# Patient Record
Sex: FEMALE | Race: WHITE | NOT HISPANIC OR LATINO | Employment: OTHER | URBAN - NONMETROPOLITAN AREA
[De-identification: names, ages, dates, MRNs, and addresses within clinical notes are randomized per-mention and may not be internally consistent; named-entity substitution may affect disease eponyms.]

---

## 2017-03-23 ENCOUNTER — TRANSCRIBE ORDERS (OUTPATIENT)
Dept: ADMINISTRATIVE | Facility: HOSPITAL | Age: 77
End: 2017-03-23

## 2017-03-23 ENCOUNTER — HOSPITAL ENCOUNTER (OUTPATIENT)
Dept: GENERAL RADIOLOGY | Facility: HOSPITAL | Age: 77
Discharge: HOME | End: 2017-03-23
Admitting: FAMILY MEDICINE

## 2017-03-23 DIAGNOSIS — R05.9 COUGH: Primary | ICD-10-CM

## 2017-03-23 DIAGNOSIS — R06.2 WHEEZING: ICD-10-CM

## 2017-03-23 PROCEDURE — 71020 HC CHEST PA AND LATERAL: CPT

## 2017-04-24 ENCOUNTER — HOSPITAL ENCOUNTER (OUTPATIENT)
Dept: CT IMAGING | Facility: HOSPITAL | Age: 77
Discharge: HOME | End: 2017-04-24
Attending: INTERNAL MEDICINE | Admitting: INTERNAL MEDICINE

## 2017-04-24 ENCOUNTER — LAB (OUTPATIENT)
Dept: LAB | Facility: HOSPITAL | Age: 77
End: 2017-04-24

## 2017-04-24 DIAGNOSIS — C34.12 CANCER OF UPPER LOBE OF LEFT LUNG (HCC): ICD-10-CM

## 2017-04-24 LAB
ALBUMIN SERPL-MCNC: 4.1 G/DL (ref 3.2–4.8)
ALBUMIN/GLOB SERPL: 1.8 G/DL (ref 1.5–2.5)
ALP SERPL-CCNC: 63 U/L (ref 25–100)
ALT SERPL W P-5'-P-CCNC: 29 U/L (ref 7–40)
ANION GAP SERPL CALCULATED.3IONS-SCNC: 9 MMOL/L (ref 3–11)
AST SERPL-CCNC: 27 U/L (ref 0–33)
BASOPHILS # BLD AUTO: 0.03 10*3/MM3 (ref 0–0.2)
BASOPHILS NFR BLD AUTO: 0.3 % (ref 0–1)
BILIRUB SERPL-MCNC: 0.3 MG/DL (ref 0.3–1.2)
BUN BLD-MCNC: 25 MG/DL (ref 9–23)
BUN/CREAT SERPL: 22.7 (ref 7–25)
CALCIUM SPEC-SCNC: 9.9 MG/DL (ref 8.7–10.4)
CHLORIDE SERPL-SCNC: 105 MMOL/L (ref 99–109)
CO2 SERPL-SCNC: 29 MMOL/L (ref 20–31)
CREAT BLD-MCNC: 1.1 MG/DL (ref 0.6–1.3)
CREAT BLDA-MCNC: 1.1 MG/DL (ref 0.6–1.3)
DEPRECATED RDW RBC AUTO: 51.4 FL (ref 37–54)
EOSINOPHIL # BLD AUTO: 0.6 10*3/MM3 (ref 0.1–0.3)
EOSINOPHIL NFR BLD AUTO: 5.7 % (ref 0–3)
ERYTHROCYTE [DISTWIDTH] IN BLOOD BY AUTOMATED COUNT: 15.1 % (ref 11.3–14.5)
GFR SERPL CREATININE-BSD FRML MDRD: 48 ML/MIN/1.73
GLOBULIN UR ELPH-MCNC: 2.3 GM/DL
GLUCOSE BLD-MCNC: 89 MG/DL (ref 70–100)
HCT VFR BLD AUTO: 39.6 % (ref 34.5–44)
HGB BLD-MCNC: 12.1 G/DL (ref 11.5–15.5)
IMM GRANULOCYTES # BLD: 0.05 10*3/MM3 (ref 0–0.03)
IMM GRANULOCYTES NFR BLD: 0.5 % (ref 0–0.6)
LYMPHOCYTES # BLD AUTO: 2.13 10*3/MM3 (ref 0.6–4.8)
LYMPHOCYTES NFR BLD AUTO: 20.2 % (ref 24–44)
MCH RBC QN AUTO: 28.7 PG (ref 27–31)
MCHC RBC AUTO-ENTMCNC: 30.6 G/DL (ref 32–36)
MCV RBC AUTO: 94.1 FL (ref 80–99)
MONOCYTES # BLD AUTO: 0.84 10*3/MM3 (ref 0–1)
MONOCYTES NFR BLD AUTO: 8 % (ref 0–12)
NEUTROPHILS # BLD AUTO: 6.91 10*3/MM3 (ref 1.5–8.3)
NEUTROPHILS NFR BLD AUTO: 65.3 % (ref 41–71)
PLATELET # BLD AUTO: 221 10*3/MM3 (ref 150–450)
PMV BLD AUTO: 10.8 FL (ref 6–12)
POTASSIUM BLD-SCNC: 4.5 MMOL/L (ref 3.5–5.5)
PROT SERPL-MCNC: 6.4 G/DL (ref 5.7–8.2)
RBC # BLD AUTO: 4.21 10*6/MM3 (ref 3.89–5.14)
SODIUM BLD-SCNC: 143 MMOL/L (ref 132–146)
WBC NRBC COR # BLD: 10.56 10*3/MM3 (ref 3.5–10.8)

## 2017-04-24 PROCEDURE — 71260 CT THORAX DX C+: CPT

## 2017-04-24 PROCEDURE — 0 IOPAMIDOL 61 % SOLUTION: Performed by: INTERNAL MEDICINE

## 2017-04-24 PROCEDURE — 85025 COMPLETE CBC W/AUTO DIFF WBC: CPT | Performed by: INTERNAL MEDICINE

## 2017-04-24 PROCEDURE — 80053 COMPREHEN METABOLIC PANEL: CPT | Performed by: INTERNAL MEDICINE

## 2017-04-24 PROCEDURE — 74177 CT ABD & PELVIS W/CONTRAST: CPT

## 2017-04-24 PROCEDURE — 36415 COLL VENOUS BLD VENIPUNCTURE: CPT

## 2017-04-24 PROCEDURE — 82565 ASSAY OF CREATININE: CPT

## 2017-04-24 RX ADMIN — IOPAMIDOL 60 ML: 612 INJECTION, SOLUTION INTRAVENOUS at 09:30

## 2017-04-26 ENCOUNTER — OFFICE VISIT (OUTPATIENT)
Dept: ONCOLOGY | Facility: CLINIC | Age: 77
End: 2017-04-26

## 2017-04-26 VITALS
WEIGHT: 210 LBS | BODY MASS INDEX: 34.95 KG/M2 | HEART RATE: 76 BPM | RESPIRATION RATE: 15 BRPM | DIASTOLIC BLOOD PRESSURE: 72 MMHG | TEMPERATURE: 97.2 F | SYSTOLIC BLOOD PRESSURE: 150 MMHG

## 2017-04-26 DIAGNOSIS — C34.12 CANCER OF UPPER LOBE OF LEFT LUNG (HCC): Primary | ICD-10-CM

## 2017-04-26 PROCEDURE — 99214 OFFICE O/P EST MOD 30 MIN: CPT | Performed by: INTERNAL MEDICINE

## 2017-04-26 NOTE — PROGRESS NOTES
DATE OF VISIT: 4/26/2017    REASON FOR VISIT: Followup stage IB, [id number], poorly differentiated  adenocarcinoma of the lung.    HISTORY OF PRESENT ILLNESS: The patient is a very pleasant 76-year-old female  with past medical history significant for poorly differentiated adenocarcinoma  of the lung, [id number], tumor size 3.2 cm in the left upper lobe. The patient is  status post left upper lobe resection with mediastinal lymph node sampling done  by Dr. Jeremy Herrera on 08/26/2015. The patient is here today in scheduled  followup visit.    SUBJECTIVE: The patient has been doing fairly well.  She is recovering from an upper airway infection she completed [facility] She denied any headaches,  denied any night sweats. He breathing is back to normal. Her appetite is good.  She denied any abdominal pain.     REVIEW OF SYSTEMS: All the other systems are reviewed by me and negative  except what is mentioned in HPI and subjectives.     PAST MEDICAL HISTORY/SOCIAL HISTORY/FAMILY HISTORY: Unchanged from my prior  documentation done on 08/13/2015.      Current Outpatient Prescriptions:   •  Ascorbic Acid (VITAMIN C PO), Take  by mouth., Disp: , Rfl:   •  aspirin 81 MG tablet, Take  by mouth., Disp: , Rfl:   •  Cholecalciferol (VITAMIN D3 PO), Take  by mouth., Disp: , Rfl:   •  CRANBERRY PO, Take  by mouth., Disp: , Rfl:   •  ezetimibe (ZETIA) 10 MG tablet, Take  by mouth., Disp: , Rfl:   •  loratadine (CLARITIN) 10 MG tablet, Take  by mouth., Disp: , Rfl:   •  metoclopramide (REGLAN) 5 MG tablet, , Disp: , Rfl:   •  montelukast (SINGULAIR) 10 MG tablet, Take  by mouth., Disp: , Rfl:   •  Multiple Vitamin tablet, Take  by mouth., Disp: , Rfl:   •  olmesartan-hydrochlorothiazide (BENICAR HCT) 40-12.5 MG per tablet, Take  by mouth., Disp: , Rfl:   •  Omega-3 Fatty Acids (OMEGA 3 PO), Take  by mouth., Disp: , Rfl:   •  Omeprazole 20 MG tablet delayed-release, Take  by mouth., Disp: , Rfl:   •  simvastatin (ZOCOR) 40 MG tablet, Take  by  mouth., Disp: , Rfl:   •  Triamcinolone Acetonide (NASACORT AQ NA), into each nostril., Disp: , Rfl:     PHYSICAL EXAMINATION:   There were no vitals taken for this visit.  ECOG1  GENERAL: Age appropriate. No acute distress.   HEENT: Head atraumatic, normocephalic.   NECK: Supple. No JVD. No lymphadenopathy.   LUNGS: Clear to auscultation bilaterally. No wheezing. No rhonchi.   HEART: Regular rate and rhythm. S1, S2, no murmurs.   ABDOMEN: Soft, nontender, nondistended. Bowel sounds positive. No  hepatosplenomegaly.   EXTREMITIES: No clubbing, cyanosis, or edema.   SKIN: No rashes. No purpura.   NEUROLOGIC: Awake and oriented x3. Strength 5 out of 5 in all muscle groups.     Lab on 04/24/2017   Component Date Value Ref Range Status   • Glucose 04/24/2017 89  70 - 100 mg/dL Final   • BUN 04/24/2017 25* 9 - 23 mg/dL Final   • Creatinine 04/24/2017 1.10  0.60 - 1.30 mg/dL Final   • Sodium 04/24/2017 143  132 - 146 mmol/L Final   • Potassium 04/24/2017 4.5  3.5 - 5.5 mmol/L Final   • Chloride 04/24/2017 105  99 - 109 mmol/L Final   • CO2 04/24/2017 29.0  20.0 - 31.0 mmol/L Final   • Calcium 04/24/2017 9.9  8.7 - 10.4 mg/dL Final   • Total Protein 04/24/2017 6.4  5.7 - 8.2 g/dL Final   • Albumin 04/24/2017 4.10  3.20 - 4.80 g/dL Final   • ALT (SGPT) 04/24/2017 29  7 - 40 U/L Final   • AST (SGOT) 04/24/2017 27  0 - 33 U/L Final   • Alkaline Phosphatase 04/24/2017 63  25 - 100 U/L Final   • Total Bilirubin 04/24/2017 0.3  0.3 - 1.2 mg/dL Final   • eGFR Non  Amer 04/24/2017 48* >60 mL/min/1.73 Final   • Globulin 04/24/2017 2.3  gm/dL Final   • A/G Ratio 04/24/2017 1.8  1.5 - 2.5 g/dL Final   • BUN/Creatinine Ratio 04/24/2017 22.7  7.0 - 25.0 Final   • Anion Gap 04/24/2017 9.0  3.0 - 11.0 mmol/L Final   • WBC 04/24/2017 10.56  3.50 - 10.80 10*3/mm3 Final   • RBC 04/24/2017 4.21  3.89 - 5.14 10*6/mm3 Final   • Hemoglobin 04/24/2017 12.1  11.5 - 15.5 g/dL Final   • Hematocrit 04/24/2017 39.6  34.5 - 44.0 % Final   • MCV  04/24/2017 94.1  80.0 - 99.0 fL Final   • MCH 04/24/2017 28.7  27.0 - 31.0 pg Final   • MCHC 04/24/2017 30.6* 32.0 - 36.0 g/dL Final   • RDW 04/24/2017 15.1* 11.3 - 14.5 % Final   • RDW-SD 04/24/2017 51.4  37.0 - 54.0 fl Final   • MPV 04/24/2017 10.8  6.0 - 12.0 fL Final   • Platelets 04/24/2017 221  150 - 450 10*3/mm3 Final   • Neutrophil % 04/24/2017 65.3  41.0 - 71.0 % Final   • Lymphocyte % 04/24/2017 20.2* 24.0 - 44.0 % Final   • Monocyte % 04/24/2017 8.0  0.0 - 12.0 % Final   • Eosinophil % 04/24/2017 5.7* 0.0 - 3.0 % Final   • Basophil % 04/24/2017 0.3  0.0 - 1.0 % Final   • Immature Grans % 04/24/2017 0.5  0.0 - 0.6 % Final   • Neutrophils, Absolute 04/24/2017 6.91  1.50 - 8.30 10*3/mm3 Final   • Lymphocytes, Absolute 04/24/2017 2.13  0.60 - 4.80 10*3/mm3 Final   • Monocytes, Absolute 04/24/2017 0.84  0.00 - 1.00 10*3/mm3 Final   • Eosinophils, Absolute 04/24/2017 0.60* 0.10 - 0.30 10*3/mm3 Final   • Basophils, Absolute 04/24/2017 0.03  0.00 - 0.20 10*3/mm3 Final   • Immature Grans, Absolute 04/24/2017 0.05* 0.00 - 0.03 10*3/mm3 Final   Hospital Outpatient Visit on 04/24/2017   Component Date Value Ref Range Status   • Creatinine 04/24/2017 1.10  0.60 - 1.30 mg/dL Final    Serial Number: [id number]    : [id number]      Ct Chest With Contrast    Result Date: 4/25/2017  Narrative: EXAMINATION: CT CHEST W CONTRAST, CT ABDOMEN AND PELVIS W CONTRAST-04/24/2017:  INDICATION: Follow-up scan; C34.12-Malignant neoplasm of upper lobe, left bronchus or lung, followup lung cancer.  TECHNIQUE: Multislice helical CT with contrast. Two-dimensional reformatted images were provided for this exam.  The radiation dose reduction device was turned on for each scan per the ALARA (As Low as Reasonably Achievable) protocol.  COMPARISON: 10/24/2016.  FINDINGS:  CHEST:  The visible portions of the thyroid gland are normal. There is no significant axillary adenopathy. The aorta branches normally. The heart size is grossly  unremarkable. The course and caliber of the esophagus is normal. Mild paraseptal emphysematous changes are present. Interlobular septal thickening is present. There is trace nodularity along the greater fissure superiorly. Areas of scarring are present in the posterior portions of the right lower lobe. There is also some scarring in the posterior portion of the left lower lobe. The left upper lobe has been resected. The osseous structures of the chest are normal.  ABDOMEN:  The hepatic parenchyma contains a cyst in the left lobe; stable. The remaining hepatic parenchyma is normal. The gallbladder and spleen are unremarkable. The pancreatic tissues and adrenal glands are normal. The left kidney is atrophic. Nonobstructing nephrolith is present in the right renal collecting system. The aorta and its branches are moderately calcified. The course and caliber of stomach and proximal small bowel are normal.  PELVIS:  There is no free pelvic fluid. Multiple diverticula are scattered throughout the sigmoid colon. There is no significant inguinal adenopathy. Delayed images reveal normal right renal collecting system and ureter. There are no filling defects in the bladder.  The osseous structures of the abdomen and pelvis are normal.      Impression: 1.  Slight increase in interstitial scarring in the lungs since the previous exam. 2.  No acute pathology in the chest, abdomen or pelvis. 3.  Left renal atrophy, stable. 4.  Stable hepatic cyst.  D:  04/24/2017 E:  04/24/2017    This report was finalized on 4/25/2017 12:18 PM by Dr. Ray Chun MD.      Ct Abdomen Pelvis With Contrast    Result Date: 4/25/2017  Narrative: EXAMINATION: CT CHEST W CONTRAST, CT ABDOMEN AND PELVIS W CONTRAST-04/24/2017:  INDICATION: Follow-up scan; C34.12-Malignant neoplasm of upper lobe, left bronchus or lung, followup lung cancer.  TECHNIQUE: Multislice helical CT with contrast. Two-dimensional reformatted images were provided for this exam.  The  radiation dose reduction device was turned on for each scan per the ALARA (As Low as Reasonably Achievable) protocol.  COMPARISON: 10/24/2016.  FINDINGS:  CHEST:  The visible portions of the thyroid gland are normal. There is no significant axillary adenopathy. The aorta branches normally. The heart size is grossly unremarkable. The course and caliber of the esophagus is normal. Mild paraseptal emphysematous changes are present. Interlobular septal thickening is present. There is trace nodularity along the greater fissure superiorly. Areas of scarring are present in the posterior portions of the right lower lobe. There is also some scarring in the posterior portion of the left lower lobe. The left upper lobe has been resected. The osseous structures of the chest are normal.  ABDOMEN:  The hepatic parenchyma contains a cyst in the left lobe; stable. The remaining hepatic parenchyma is normal. The gallbladder and spleen are unremarkable. The pancreatic tissues and adrenal glands are normal. The left kidney is atrophic. Nonobstructing nephrolith is present in the right renal collecting system. The aorta and its branches are moderately calcified. The course and caliber of stomach and proximal small bowel are normal.  PELVIS:  There is no free pelvic fluid. Multiple diverticula are scattered throughout the sigmoid colon. There is no significant inguinal adenopathy. Delayed images reveal normal right renal collecting system and ureter. There are no filling defects in the bladder.  The osseous structures of the abdomen and pelvis are normal.      Impression: 1.  Slight increase in interstitial scarring in the lungs since the previous exam. 2.  No acute pathology in the chest, abdomen or pelvis. 3.  Left renal atrophy, stable. 4.  Stable hepatic cyst.  D:  04/24/2017 E:  04/24/2017    This report was finalized on 4/25/2017 12:18 PM by Dr. Ray Chun MD.    (    ASSESSMENT: The patient is a very pleasant 76-year-old female  with stage IB  adenocarcinoma of the lung.    PROBLEM LIST:  1. Stage IB poorly differentiated adenocarcinoma of the lung, [id number].  2. Diagnosed 08/03/2015 after CT-guided biopsy.  3. Status post left upper lobe resection with mediastinal lymph node sampling  done by Dr. Herrera on 08/26/2015.  4.  Hypertension  5.  Hypercholesterolemia  6.  Heartburn    PLAN:  1. I did go over the CAT scan results in detail with the patient and her  . I reviewed the patient's films myself. There is no evidence of  relapsed disease.   2. The patient will come back to see me in 6 months with  repeat CAT scan.   3. We will continue to monitor the patient's blood counts, kidney function,  and liver function.   4. She will have repeat CBC and CMP prior to return.  5.  We'll continue hydrochlorothiazide with Benicar for hypertension  6.  Continue simvastatin for hypercholesterolemia  6.  We'll continue omeprazole 20 mg daily for heartburn    Luisana Washburn MD  4/26/2017

## 2017-10-30 ENCOUNTER — HOSPITAL ENCOUNTER (OUTPATIENT)
Dept: CT IMAGING | Facility: HOSPITAL | Age: 77
Discharge: HOME | End: 2017-10-30
Attending: INTERNAL MEDICINE | Admitting: INTERNAL MEDICINE

## 2017-10-30 DIAGNOSIS — C34.12 CANCER OF UPPER LOBE OF LEFT LUNG (HCC): ICD-10-CM

## 2017-10-30 LAB — CREAT BLDA-MCNC: 1.3 MG/DL (ref 0.6–1.3)

## 2017-10-30 PROCEDURE — 74176 CT ABD & PELVIS W/O CONTRAST: CPT

## 2017-10-30 PROCEDURE — 71250 CT THORAX DX C-: CPT

## 2017-10-30 PROCEDURE — 85025 COMPLETE CBC W/AUTO DIFF WBC: CPT | Performed by: INTERNAL MEDICINE

## 2017-10-30 PROCEDURE — 82565 ASSAY OF CREATININE: CPT

## 2017-10-30 PROCEDURE — 80053 COMPREHEN METABOLIC PANEL: CPT | Performed by: INTERNAL MEDICINE

## 2017-11-01 ENCOUNTER — OFFICE VISIT (OUTPATIENT)
Dept: ONCOLOGY | Facility: CLINIC | Age: 77
End: 2017-11-01

## 2017-11-01 VITALS
DIASTOLIC BLOOD PRESSURE: 70 MMHG | SYSTOLIC BLOOD PRESSURE: 156 MMHG | HEART RATE: 68 BPM | TEMPERATURE: 97.5 F | RESPIRATION RATE: 15 BRPM | BODY MASS INDEX: 36.11 KG/M2 | WEIGHT: 217 LBS

## 2017-11-01 DIAGNOSIS — C34.12 CANCER OF UPPER LOBE OF LEFT LUNG (HCC): Primary | ICD-10-CM

## 2017-11-01 PROCEDURE — 99215 OFFICE O/P EST HI 40 MIN: CPT | Performed by: INTERNAL MEDICINE

## 2017-11-01 NOTE — PROGRESS NOTES
DATE OF VISIT: 11/1/2017    REASON FOR VISIT: Followup stage IB, [id number], poorly differentiated  adenocarcinoma of the lung.    HISTORY OF PRESENT ILLNESS: The patient is a very pleasant 76-year-old female  with past medical history significant for poorly differentiated adenocarcinoma  of the lung, [id number], tumor size 3.2 cm in the left upper lobe. The patient is  status post left upper lobe resection with mediastinal lymph node sampling done  by Dr. Jeremy Herrera on 08/26/2015. The patient is here today in scheduled  followup visit.    SUBJECTIVE: The patient has been doing fairly well.  She denied any headaches,  denied any night sweats. He breathing is back to normal. Her appetite is good.  She denied any abdominal pain.     REVIEW OF SYSTEMS: All the other systems are reviewed by me and negative  except what is mentioned in HPI and subjectives.     PAST MEDICAL HISTORY/SOCIAL HISTORY/FAMILY HISTORY: Unchanged from my prior  documentation done on 08/13/2015.      Current Outpatient Prescriptions:   •  Ascorbic Acid (VITAMIN C PO), Take  by mouth., Disp: , Rfl:   •  aspirin 81 MG tablet, Take  by mouth., Disp: , Rfl:   •  Cholecalciferol (VITAMIN D3 PO), Take  by mouth., Disp: , Rfl:   •  CRANBERRY PO, Take  by mouth., Disp: , Rfl:   •  ezetimibe (ZETIA) 10 MG tablet, Take  by mouth., Disp: , Rfl:   •  loratadine (CLARITIN) 10 MG tablet, Take  by mouth., Disp: , Rfl:   •  metoclopramide (REGLAN) 5 MG tablet, , Disp: , Rfl:   •  montelukast (SINGULAIR) 10 MG tablet, Take  by mouth., Disp: , Rfl:   •  Multiple Vitamin tablet, Take  by mouth., Disp: , Rfl:   •  olmesartan-hydrochlorothiazide (BENICAR HCT) 40-12.5 MG per tablet, Take  by mouth., Disp: , Rfl:   •  Omega-3 Fatty Acids (OMEGA 3 PO), Take  by mouth., Disp: , Rfl:   •  Omeprazole 20 MG tablet delayed-release, Take  by mouth., Disp: , Rfl:   •  simvastatin (ZOCOR) 40 MG tablet, Take  by mouth., Disp: , Rfl:   •  Triamcinolone Acetonide (NASACORT AQ NA), into  each nostril., Disp: , Rfl:     PHYSICAL EXAMINATION:   /70  Pulse 68  Temp 97.5 °F (36.4 °C) (Temporal Artery )   Resp 15  Wt 217 lb (98.4 kg)  BMI 36.11 kg/m2  ECOG1  GENERAL: Age appropriate. No acute distress.   HEENT: Head atraumatic, normocephalic.   NECK: Supple. No JVD. No lymphadenopathy.   LUNGS: Clear to auscultation bilaterally. No wheezing. No rhonchi.   HEART: Regular rate and rhythm. S1, S2, no murmurs.   ABDOMEN: Soft, nontender, nondistended. Bowel sounds positive. No  hepatosplenomegaly.   EXTREMITIES: No clubbing, cyanosis, or edema.   SKIN: No rashes. No purpura.   NEUROLOGIC: Awake and oriented x3. Strength 5 out of 5 in all muscle groups.     Lab on 10/30/2017   Component Date Value Ref Range Status   • Glucose 10/30/2017 96  70 - 100 mg/dL Final   • BUN 10/30/2017 30* 9 - 23 mg/dL Final   • Creatinine 10/30/2017 1.20  0.60 - 1.30 mg/dL Final   • Sodium 10/30/2017 140  132 - 146 mmol/L Final   • Potassium 10/30/2017 4.5  3.5 - 5.5 mmol/L Final   • Chloride 10/30/2017 107  99 - 109 mmol/L Final   • CO2 10/30/2017 30.0  20.0 - 31.0 mmol/L Final   • Calcium 10/30/2017 9.6  8.7 - 10.4 mg/dL Final   • Total Protein 10/30/2017 6.3  5.7 - 8.2 g/dL Final   • Albumin 10/30/2017 4.20  3.20 - 4.80 g/dL Final   • ALT (SGPT) 10/30/2017 30  7 - 40 U/L Final   • AST (SGOT) 10/30/2017 23  0 - 33 U/L Final   • Alkaline Phosphatase 10/30/2017 66  25 - 100 U/L Final   • Total Bilirubin 10/30/2017 0.2* 0.3 - 1.2 mg/dL Final   • eGFR Non African Amer 10/30/2017 44* >60 mL/min/1.73 Final   • Globulin 10/30/2017 2.1  gm/dL Final   • A/G Ratio 10/30/2017 2.0  1.5 - 2.5 g/dL Final   • BUN/Creatinine Ratio 10/30/2017 25.0  7.0 - 25.0 Final   • Anion Gap 10/30/2017 3.0  3.0 - 11.0 mmol/L Final   • WBC 10/30/2017 9.28  3.50 - 10.80 10*3/mm3 Final   • RBC 10/30/2017 3.86* 3.89 - 5.14 10*6/mm3 Final   • Hemoglobin 10/30/2017 11.4* 11.5 - 15.5 g/dL Final   • Hematocrit 10/30/2017 37.0  34.5 - 44.0 % Final   • MCV  10/30/2017 95.9  80.0 - 99.0 fL Final   • MCH 10/30/2017 29.5  27.0 - 31.0 pg Final   • MCHC 10/30/2017 30.8* 32.0 - 36.0 g/dL Final   • RDW 10/30/2017 14.2  11.3 - 14.5 % Final   • RDW-SD 10/30/2017 49.4  37.0 - 54.0 fl Final   • MPV 10/30/2017 11.1  6.0 - 12.0 fL Final   • Platelets 10/30/2017 209  150 - 450 10*3/mm3 Final   • Neutrophil % 10/30/2017 63.5  41.0 - 71.0 % Final   • Lymphocyte % 10/30/2017 20.8* 24.0 - 44.0 % Final   • Monocyte % 10/30/2017 9.3  0.0 - 12.0 % Final   • Eosinophil % 10/30/2017 5.9* 0.0 - 3.0 % Final   • Basophil % 10/30/2017 0.2  0.0 - 1.0 % Final   • Immature Grans % 10/30/2017 0.3  0.0 - 0.6 % Final   • Neutrophils, Absolute 10/30/2017 5.89  1.50 - 8.30 10*3/mm3 Final   • Lymphocytes, Absolute 10/30/2017 1.93  0.60 - 4.80 10*3/mm3 Final   • Monocytes, Absolute 10/30/2017 0.86  0.00 - 1.00 10*3/mm3 Final   • Eosinophils, Absolute 10/30/2017 0.55* 0.00 - 0.30 10*3/mm3 Final   • Basophils, Absolute 10/30/2017 0.02  0.00 - 0.20 10*3/mm3 Final   • Immature Grans, Absolute 10/30/2017 0.03  0.00 - 0.03 10*3/mm3 Final   Hospital Outpatient Visit on 10/30/2017   Component Date Value Ref Range Status   • Creatinine 10/30/2017 1.30  0.60 - 1.30 mg/dL Final    Serial Number: [id number]:  [id number]      Ct Abdomen Pelvis Without Contrast    Result Date: 10/30/2017  Narrative: EXAMINATION: CT ABDOMEN AND PELVIS WO CONTRAST-  INDICATION: Followup scan; C34.12-Malignant neoplasm of upper lobe, left bronchus or lung.  TECHNIQUE: CT scan of the abdomen and pelvis was performed without contrast.  The radiation dose reduction device was turned on for each scan per the ALARA (As Low as Reasonably Achievable) protocol.  COMPARISON: 04/24/2017.  FINDINGS: There is a small incidental cyst in the liver. The liver is otherwise normal. The spleen is normal. There is no adrenal mass. The pancreas is normal. There is an atrophic left kidney. The right kidney is normal. There is no ascites, aneurysm or  retroperitoneal lymphadenopathy. There is sigmoid diverticulosis without diverticulitis.      Impression: There is no evidence of metastatic disease in the abdomen or pelvis. There is an atrophic left kidney.  D:  10/30/2017 E:  10/30/2017  This report was finalized on 10/30/2017 12:35 PM by Dr. Que Hirsch MD.      Ct Chest Without Contrast    Result Date: 10/30/2017  Narrative: EXAMINATION: CT CHEST WO CONTRAST-10/30/2017:  INDICATION: F/U scan; C34.12-Malignant neoplasm of upper lobe, left bronchus or lung.     TECHNIQUE: CT scan of the chest was performed without contrast.  The radiation dose reduction device was turned on for each scan per the ALARA (As Low as Reasonably Achievable) protocol.  COMPARISON: 04/24/2017.  FINDINGS: There is no axillary lymphadenopathy. There is no mediastinal or hilar lymphadenopathy. There is no pericardial or pleural effusion. There is chronic elevation of the left diaphragm. Images displayed at lung window settings demonstrate chronic apical changes. There is a right upper lobe pleural-based nodule which has increased in size since the previous examination and measures 1.5 cm in greatest dimension. This nodule is virtually imperceptible on the exam of 04/24/2017.      Impression: There is a new 1.5 cm irregular nodule in the posterior aspect of the right upper lobe abutting the major fissure. This represents significant interval change since 04/24/2017. Otherwise there has been no significant change.  D:  10/30/2017 E:  10/30/2017    This report was finalized on 10/30/2017 12:35 PM by Dr. Que Hirsch MD.    (    ASSESSMENT: The patient is a very pleasant 76-year-old female with stage IB  adenocarcinoma of the lung.    PROBLEM LIST:  1. Stage IB poorly differentiated adenocarcinoma of the lung, [id number].  2. Diagnosed 08/03/2015 after CT-guided biopsy.  3. Status post left upper lobe resection with mediastinal lymph node sampling  done by Dr. Herrera on 08/26/2015.  4.   Hypertension  5.  Hypercholesterolemia  6.  Heartburn    PLAN:  1. I did go over the CAT scan results in detail with the patient and her  . I reviewed the patient's films myself, I went over the pictures with the patient and her . There is a 1.5 cm nodule in the right upper lobe area.  This is a new finding.  2.  I will see the patient up to have whole-body PET scan.  This is the only hypermetabolic abnormality I will arrange for [employer] radiation treatment.  3. We will continue to monitor the patient's blood counts, kidney function,  and liver function.   4. She will have repeat CBC and CMP prior to return.  5.  We'll continue hydrochlorothiazide with Benicar for hypertension  6.  Continue simvastatin for hypercholesterolemia  6.  We'll continue omeprazole 20 mg daily for heartburn  7.  I'll present the patient case at lung cancer tumor Board next week.  8.  The patient will follow-up with me in one week to go over the results.  Luisana Washburn MD  11/1/2017

## 2017-11-06 ENCOUNTER — HOSPITAL ENCOUNTER (OUTPATIENT)
Dept: PET IMAGING | Facility: HOSPITAL | Age: 77
Discharge: HOME | End: 2017-11-06
Attending: INTERNAL MEDICINE | Admitting: INTERNAL MEDICINE

## 2017-11-06 ENCOUNTER — HOSPITAL ENCOUNTER (OUTPATIENT)
Dept: PET IMAGING | Facility: HOSPITAL | Age: 77
Discharge: HOME | End: 2017-11-06
Attending: INTERNAL MEDICINE

## 2017-11-06 DIAGNOSIS — C34.12 CANCER OF UPPER LOBE OF LEFT LUNG (HCC): ICD-10-CM

## 2017-11-06 LAB — GLUCOSE BLDC GLUCOMTR-MCNC: 93 MG/DL (ref 70–130)

## 2017-11-06 PROCEDURE — A9552 F18 FDG: HCPCS | Performed by: INTERNAL MEDICINE

## 2017-11-06 PROCEDURE — 0 FLUDEOXYGLUCOSE F18 SOLUTION: Performed by: INTERNAL MEDICINE

## 2017-11-06 PROCEDURE — 78816 PET IMAGE W/CT FULL BODY: CPT

## 2017-11-06 PROCEDURE — 82962 GLUCOSE BLOOD TEST: CPT

## 2017-11-06 RX ADMIN — FLUDEOXYGLUCOSE F18 1 DOSE: 300 INJECTION INTRAVENOUS at 13:07

## 2017-11-07 NOTE — PROGRESS NOTES
DATE OF VISIT: 11/8/2017    REASON FOR VISIT: Followup for:  A. Stage IB, [id number], poorly differentiated adenocarcinoma of the lung.  B. New right upper lobe lung nodule with left adrenal nodule, both were hypermetabolic active on PET scan done on November 6, 2017    HISTORY OF PRESENT ILLNESS: The patient is a very pleasant 76-year-old female with past medical history significant for poorly differentiated adenocarcinoma of the lung, [id number], tumor size 3.2 cm in the left upper lobe. The patient is status post left upper lobe resection with mediastinal lymph node sampling done by Dr. Jeremy Herrera on 08/26/2015. The patient is here today in scheduled  followup visit.    SUBJECTIVE: The patient has been doing fairly well.  She denied any headaches,  denied any night sweats. He breathing is back to normal. Her appetite is good.  She denied any abdominal pain.     REVIEW OF SYSTEMS: All the other systems are reviewed by me and negative  except what is mentioned in HPI and subjectives.     PAST MEDICAL HISTORY/SOCIAL HISTORY/FAMILY HISTORY: Unchanged from my prior  documentation done on 08/13/2015.      Current Outpatient Prescriptions:   •  Ascorbic Acid (VITAMIN C PO), Take  by mouth., Disp: , Rfl:   •  aspirin 81 MG tablet, Take  by mouth., Disp: , Rfl:   •  Cholecalciferol (VITAMIN D3 PO), Take  by mouth., Disp: , Rfl:   •  CRANBERRY PO, Take  by mouth., Disp: , Rfl:   •  ezetimibe (ZETIA) 10 MG tablet, Take  by mouth., Disp: , Rfl:   •  loratadine (CLARITIN) 10 MG tablet, Take  by mouth., Disp: , Rfl:   •  metoclopramide (REGLAN) 5 MG tablet, , Disp: , Rfl:   •  montelukast (SINGULAIR) 10 MG tablet, Take  by mouth., Disp: , Rfl:   •  Multiple Vitamin tablet, Take  by mouth., Disp: , Rfl:   •  olmesartan-hydrochlorothiazide (BENICAR HCT) 40-12.5 MG per tablet, Take  by mouth., Disp: , Rfl:   •  Omega-3 Fatty Acids (OMEGA 3 PO), Take  by mouth., Disp: , Rfl:   •  Omeprazole 20 MG tablet delayed-release, Take  by  mouth., Disp: , Rfl:   •  simvastatin (ZOCOR) 40 MG tablet, Take  by mouth., Disp: , Rfl:   •  Triamcinolone Acetonide (NASACORT AQ NA), into each nostril., Disp: , Rfl:     PHYSICAL EXAMINATION:   /71  Pulse 76  Temp 97.3 °F (36.3 °C) (Temporal Artery )   Resp 19  Wt 213 lb (96.6 kg)  BMI 35.45 kg/m2  ECOG1  GENERAL: Age appropriate. No acute distress.   HEENT: Head atraumatic, normocephalic.   NECK: Supple. No JVD. No lymphadenopathy.   LUNGS: Clear to auscultation bilaterally. No wheezing. No rhonchi.   HEART: Regular rate and rhythm. S1, S2, no murmurs.   ABDOMEN: Soft, nontender, nondistended. Bowel sounds positive. No  hepatosplenomegaly.   EXTREMITIES: No clubbing, cyanosis, or edema.   SKIN: No rashes. No purpura.   NEUROLOGIC: Awake and oriented x3. Strength 5 out of 5 in all muscle groups.     Hospital Outpatient Visit on 11/06/2017   Component Date Value Ref Range Status   • Glucose 11/06/2017 93  70 - 130 mg/dL Final   Lab on 10/30/2017   Component Date Value Ref Range Status   • Glucose 10/30/2017 96  70 - 100 mg/dL Final   • BUN 10/30/2017 30* 9 - 23 mg/dL Final   • Creatinine 10/30/2017 1.20  0.60 - 1.30 mg/dL Final   • Sodium 10/30/2017 140  132 - 146 mmol/L Final   • Potassium 10/30/2017 4.5  3.5 - 5.5 mmol/L Final   • Chloride 10/30/2017 107  99 - 109 mmol/L Final   • CO2 10/30/2017 30.0  20.0 - 31.0 mmol/L Final   • Calcium 10/30/2017 9.6  8.7 - 10.4 mg/dL Final   • Total Protein 10/30/2017 6.3  5.7 - 8.2 g/dL Final   • Albumin 10/30/2017 4.20  3.20 - 4.80 g/dL Final   • ALT (SGPT) 10/30/2017 30  7 - 40 U/L Final   • AST (SGOT) 10/30/2017 23  0 - 33 U/L Final   • Alkaline Phosphatase 10/30/2017 66  25 - 100 U/L Final   • Total Bilirubin 10/30/2017 0.2* 0.3 - 1.2 mg/dL Final   • eGFR Non African Amer 10/30/2017 44* >60 mL/min/1.73 Final   • Globulin 10/30/2017 2.1  gm/dL Final   • A/G Ratio 10/30/2017 2.0  1.5 - 2.5 g/dL Final   • BUN/Creatinine Ratio 10/30/2017 25.0  7.0 - 25.0 Final    • Anion Gap 10/30/2017 3.0  3.0 - 11.0 mmol/L Final   • WBC 10/30/2017 9.28  3.50 - 10.80 10*3/mm3 Final   • RBC 10/30/2017 3.86* 3.89 - 5.14 10*6/mm3 Final   • Hemoglobin 10/30/2017 11.4* 11.5 - 15.5 g/dL Final   • Hematocrit 10/30/2017 37.0  34.5 - 44.0 % Final   • MCV 10/30/2017 95.9  80.0 - 99.0 fL Final   • MCH 10/30/2017 29.5  27.0 - 31.0 pg Final   • MCHC 10/30/2017 30.8* 32.0 - 36.0 g/dL Final   • RDW 10/30/2017 14.2  11.3 - 14.5 % Final   • RDW-SD 10/30/2017 49.4  37.0 - 54.0 fl Final   • MPV 10/30/2017 11.1  6.0 - 12.0 fL Final   • Platelets 10/30/2017 209  150 - 450 10*3/mm3 Final   • Neutrophil % 10/30/2017 63.5  41.0 - 71.0 % Final   • Lymphocyte % 10/30/2017 20.8* 24.0 - 44.0 % Final   • Monocyte % 10/30/2017 9.3  0.0 - 12.0 % Final   • Eosinophil % 10/30/2017 5.9* 0.0 - 3.0 % Final   • Basophil % 10/30/2017 0.2  0.0 - 1.0 % Final   • Immature Grans % 10/30/2017 0.3  0.0 - 0.6 % Final   • Neutrophils, Absolute 10/30/2017 5.89  1.50 - 8.30 10*3/mm3 Final   • Lymphocytes, Absolute 10/30/2017 1.93  0.60 - 4.80 10*3/mm3 Final   • Monocytes, Absolute 10/30/2017 0.86  0.00 - 1.00 10*3/mm3 Final   • Eosinophils, Absolute 10/30/2017 0.55* 0.00 - 0.30 10*3/mm3 Final   • Basophils, Absolute 10/30/2017 0.02  0.00 - 0.20 10*3/mm3 Final   • Immature Grans, Absolute 10/30/2017 0.03  0.00 - 0.03 10*3/mm3 Final   Hospital Outpatient Visit on 10/30/2017   Component Date Value Ref Range Status   • Creatinine 10/30/2017 1.30  0.60 - 1.30 mg/dL Final    Serial Number: [id number]:  [id number]      Ct Abdomen Pelvis Without Contrast    Result Date: 10/30/2017  Narrative: EXAMINATION: CT ABDOMEN AND PELVIS WO CONTRAST-  INDICATION: Followup scan; C34.12-Malignant neoplasm of upper lobe, left bronchus or lung.  TECHNIQUE: CT scan of the abdomen and pelvis was performed without contrast.  The radiation dose reduction device was turned on for each scan per the ALARA (As Low as Reasonably Achievable) protocol.  COMPARISON:  04/24/2017.  FINDINGS: There is a small incidental cyst in the liver. The liver is otherwise normal. The spleen is normal. There is no adrenal mass. The pancreas is normal. There is an atrophic left kidney. The right kidney is normal. There is no ascites, aneurysm or retroperitoneal lymphadenopathy. There is sigmoid diverticulosis without diverticulitis.      Impression: There is no evidence of metastatic disease in the abdomen or pelvis. There is an atrophic left kidney.  D:  10/30/2017 E:  10/30/2017  This report was finalized on 10/30/2017 12:35 PM by Dr. Que Hirsch MD.      Ct Chest Without Contrast    Result Date: 10/30/2017  Narrative: EXAMINATION: CT CHEST WO CONTRAST-10/30/2017:  INDICATION: F/U scan; C34.12-Malignant neoplasm of upper lobe, left bronchus or lung.     TECHNIQUE: CT scan of the chest was performed without contrast.  The radiation dose reduction device was turned on for each scan per the ALARA (As Low as Reasonably Achievable) protocol.  COMPARISON: 04/24/2017.  FINDINGS: There is no axillary lymphadenopathy. There is no mediastinal or hilar lymphadenopathy. There is no pericardial or pleural effusion. There is chronic elevation of the left diaphragm. Images displayed at lung window settings demonstrate chronic apical changes. There is a right upper lobe pleural-based nodule which has increased in size since the previous examination and measures 1.5 cm in greatest dimension. This nodule is virtually imperceptible on the exam of 04/24/2017.      Impression: There is a new 1.5 cm irregular nodule in the posterior aspect of the right upper lobe abutting the major fissure. This represents significant interval change since 04/24/2017. Otherwise there has been no significant change.  D:  10/30/2017 E:  10/30/2017    This report was finalized on 10/30/2017 12:35 PM by Dr. Que Hirsch MD.      Nm Pet Whole Body    Result Date: 11/6/2017  Narrative: EXAMINATION: NM PET WHOLE BODY- 11/06/2017   INDICATION: C34.12-Malignant neoplasm of upper lobe, left bronchus or lung; follow-up lung nodule  TECHNIQUE: With fasting blood glucose level of 83 mg/dL a total of 11.66 mCi of FDG was administered via the  right wrist.  Following appropriate delay PET CT imaging was obtained from the skull vertex to the feet bilaterally and fused multiplanar images were reconstructed. The CT scan is an unenhanced study and used for reference to the PET scan only and should not be considered a diagnostic CT scan. PET CT imaging was reviewed in the axial, coronal, and sagittal planes as well as within the cine mode.  COMPARISON: 08/19/2015  FINDINGS: There is normal variant activity identified within the oropharynx and muscles of phonation.  Normal variant activity identified in the region of the vocal cords. No hypermetabolic activity within the neck to suggest evidence of metastatic disease. Within the chest there is a pulmonary nodule identified posteriorly within the right upper lobe with maximum SUV of 8.8. Findings most consistent with malignancy and a metastatic nodule. Previously seen mass within the posterior aspect of the left upper lobe has been surgically removed. Remainder of the lung fields are clear. No mediastinal mass or adenopathy. Within the abdomen and pelvis there is a small hypermetabolic nodule seen approximately 1 cm in size in the right adrenal gland with maximum SUV of 5.1 most consistent with malignancy and metastatic disease. Remainder of the abdomen and pelvis is unremarkable. There is no other malignancy identified.      Impression: Metastatic nodule seen posteriorly within the right upper lobe with a second area of malignancy involving the left adrenal gland. Findings suggesting recurrence of metastatic disease.  D:  11/06/2017 E:  11/06/2017    This report was finalized on 11/6/2017 4:05 PM by Dr. Sabi Zabala MD.    (    ASSESSMENT: The patient is a very pleasant 76-year-old female with stage  IB  adenocarcinoma of the lung.    PROBLEM LIST:  1. Stage IB poorly differentiated adenocarcinoma of the lung, [id number]:  A. Diagnosed 08/03/2015 after CT-guided biopsy.  B. Status post left upper lobe resection with mediastinal lymph node sampling  done by Dr. Herrera on 08/26/2015.  C. New right upper lobe lung nodule with left adrenal nodule, both were hypermetabolic active on PET scan done on November 6, 2017  4.  Hypertension  5.  Hypercholesterolemia  6.  Heartburn    PLAN:  1. I did go over the PET scan results in detail with the patient and her  . I reviewed the patient's films myself, I discussed the case at lung cancer tumor Board.  The patient has new right upper lobe lung nodule with left adrenal nodule, both were hypermetabolic active.   2.  I will order MRI brain to complete her staging workup.  3.  I will refer patient to see Dr. Herrera for wedge resection of the right upper lobe lung nodule this would be followed by CyberKnife to the adrenal lesion.   4. She will have repeat CBC and CMP prior to return.  5.  We'll continue hydrochlorothiazide with Benicar for hypertension  6.  Continue simvastatin for hypercholesterolemia  6.  We'll continue omeprazole 20 mg daily for heartburn  7.  I'll present the patient case at lung cancer tumor Board next week.  8.  The patient will follow-up with me in one week to go over the results.  Luisana Washburn MD  11/8/2017

## 2017-11-08 ENCOUNTER — OFFICE VISIT (OUTPATIENT)
Dept: ONCOLOGY | Facility: CLINIC | Age: 77
End: 2017-11-08

## 2017-11-08 VITALS
BODY MASS INDEX: 35.45 KG/M2 | HEART RATE: 76 BPM | DIASTOLIC BLOOD PRESSURE: 71 MMHG | SYSTOLIC BLOOD PRESSURE: 152 MMHG | WEIGHT: 213 LBS | RESPIRATION RATE: 19 BRPM | TEMPERATURE: 97.3 F

## 2017-11-08 DIAGNOSIS — C34.12 CANCER OF UPPER LOBE OF LEFT LUNG (HCC): Primary | ICD-10-CM

## 2017-11-08 PROCEDURE — 99214 OFFICE O/P EST MOD 30 MIN: CPT | Performed by: INTERNAL MEDICINE

## 2017-11-14 ENCOUNTER — HOSPITAL ENCOUNTER (OUTPATIENT)
Dept: MRI IMAGING | Facility: HOSPITAL | Age: 77
Discharge: HOME | End: 2017-11-14
Attending: INTERNAL MEDICINE | Admitting: INTERNAL MEDICINE

## 2017-11-14 DIAGNOSIS — C34.12 CANCER OF UPPER LOBE OF LEFT LUNG (HCC): ICD-10-CM

## 2017-11-14 PROCEDURE — 0 GADOBENATE DIMEGLUMINE 529 MG/ML SOLUTION: Performed by: INTERNAL MEDICINE

## 2017-11-14 PROCEDURE — 70553 MRI BRAIN STEM W/O & W/DYE: CPT

## 2017-11-14 PROCEDURE — A9577 INJ MULTIHANCE: HCPCS | Performed by: INTERNAL MEDICINE

## 2017-11-14 RX ADMIN — GADOBENATE DIMEGLUMINE 20 ML: 529 INJECTION, SOLUTION INTRAVENOUS at 14:45

## 2017-11-15 ENCOUNTER — APPOINTMENT (OUTPATIENT)
Dept: MRI IMAGING | Facility: HOSPITAL | Age: 77
End: 2017-11-15
Attending: INTERNAL MEDICINE

## 2017-11-21 ENCOUNTER — OFFICE VISIT (OUTPATIENT)
Dept: CARDIAC SURGERY | Facility: CLINIC | Age: 77
End: 2017-11-21

## 2017-11-21 VITALS
TEMPERATURE: 98.7 F | HEART RATE: 87 BPM | BODY MASS INDEX: 35.99 KG/M2 | WEIGHT: 216 LBS | DIASTOLIC BLOOD PRESSURE: 83 MMHG | HEIGHT: 65 IN | OXYGEN SATURATION: 93 % | SYSTOLIC BLOOD PRESSURE: 134 MMHG

## 2017-11-21 DIAGNOSIS — R91.1 LUNG NODULE: Primary | ICD-10-CM

## 2017-11-21 PROCEDURE — 99214 OFFICE O/P EST MOD 30 MIN: CPT | Performed by: THORACIC SURGERY (CARDIOTHORACIC VASCULAR SURGERY)

## 2017-11-21 NOTE — PROGRESS NOTES
11/21/2017  Patient Information  America Nix                                                                                          [address] [place] [address]   1940  'PCP/Referring Physician'  Hai Solomon MD  [phone]  Luisana Washburn MD  [phone]  Chief Complaint   Patient presents with   • Follow-up     Follow up per Dr Washburn for new lung mass,complains of some fatigue and shortness of breath.   • Lung Cancer       History of Present Illness:  Ms. Nix is a 77 year old  female with a history of hypertension, hypercholesterolemia and Stage 1B poorly differentiated adenocarcinoma of the lung status post left upper lobectomy on 8/26/15.  She presents now with a PET active right upper lobe lung nodule and left adrenal mass seen on routine imaging.  America was referred for possible right upper lobectomy.  The patient has no symptoms or complaints.  She has mild shortness of breath with exertion that is chronic.  Ms Nix denies lymphadenopathy, weight loss or hemoptysis.      Patient Active Problem List   Diagnosis   • Cancer of upper lobe of left lung     Past Medical History:   Diagnosis Date   • Hypertension    • Lung cancer    • Pneumothorax      Past Surgical History:   Procedure Laterality Date   • ABDOMINAL SURGERY     • BREAST LUMPECTOMY     • HYSTERECTOMY     • LUNG BIOPSY     • LUNG LOBECTOMY Left    • LUNG SURGERY         Current Outpatient Prescriptions:   •  Ascorbic Acid (VITAMIN C PO), Take  by mouth., Disp: , Rfl:   •  aspirin 81 MG tablet, Take  by mouth., Disp: , Rfl:   •  Cholecalciferol (VITAMIN D3 PO), Take  by mouth., Disp: , Rfl:   •  CRANBERRY PO, Take  by mouth., Disp: , Rfl:   •  ezetimibe (ZETIA) 10 MG tablet, Take  by mouth., Disp: , Rfl:   •  loratadine (CLARITIN) 10 MG tablet, Take  by mouth., Disp: , Rfl:   •  metoclopramide (REGLAN) 5 MG tablet, , Disp: , Rfl:   •  montelukast (SINGULAIR) 10 MG tablet, Take  by mouth., Disp: , Rfl:   •   Multiple Vitamin tablet, Take  by mouth., Disp: , Rfl:   •  olmesartan-hydrochlorothiazide (BENICAR HCT) 40-12.5 MG per tablet, Take  by mouth., Disp: , Rfl:   •  Omega-3 Fatty Acids (OMEGA 3 PO), Take  by mouth., Disp: , Rfl:   •  Omeprazole 20 MG tablet delayed-release, Take  by mouth., Disp: , Rfl:   •  simvastatin (ZOCOR) 40 MG tablet, Take  by mouth., Disp: , Rfl:   •  Triamcinolone Acetonide (NASACORT AQ NA), into each nostril., Disp: , Rfl:   Allergies   Allergen Reactions   • Codeine    • Hydrocodone    • Pneumococcal Vaccines      Social History     Social History   • Marital status:      Spouse name: N/A   • Number of children: N/A   • Years of education: N/A     Occupational History   • Not on file.     Social History Main Topics   • Smoking status: Former Smoker     Packs/day: 0.50     Years: 35.00     Quit date: 2015   • Smokeless tobacco: Never Used      Comment: smoked up to 2ppd at times   • Alcohol use No   • Drug use: No   • Sexual activity: Not on file     Other Topics Concern   • Not on file     Social History Narrative     Family History   Problem Relation Age of Onset   • Hypertension Mother    • Heart attack Mother    • Heart attack Father    • Other Sister      renal mass   • Leukemia Maternal Aunt      Review of Systems   Constitution: Positive for malaise/fatigue. Negative for chills, fever, night sweats and weight loss.   HENT: Negative.  Negative for hearing loss, odynophagia and sore throat.    Eyes: Negative.    Cardiovascular: Positive for dyspnea on exertion. Negative for chest pain, leg swelling, orthopnea and palpitations.   Respiratory: Positive for shortness of breath. Negative for cough and hemoptysis.    Endocrine: Negative.  Negative for cold intolerance, heat intolerance, polydipsia, polyphagia and polyuria.   Hematologic/Lymphatic: Negative.  Does not bruise/bleed easily.   Skin: Negative.  Negative for itching and rash.   Musculoskeletal: Positive for joint pain and  "muscle cramps. Negative for joint swelling and myalgias.   Gastrointestinal: Negative.  Negative for abdominal pain, constipation, diarrhea, hematemesis, hematochezia, melena, nausea and vomiting.   Genitourinary: Negative.  Negative for dysuria, frequency and hematuria.   Neurological: Negative for focal weakness, headaches, numbness and seizures.   Psychiatric/Behavioral: Negative.  Negative for suicidal ideas.   Allergic/Immunologic: Negative.    All other systems reviewed and are negative.    Vitals:    11/21/17 1219   BP: 134/83   BP Location: Right arm   Pulse: 87   Temp: 98.7 °F (37.1 °C)   SpO2: 93%   Weight: 216 lb (98 kg)   Height: 65\" (165.1 cm)      Physical Exam   Constitutional: She is oriented to person, place, and time. She appears well-developed and well-nourished. No distress.   HENT:   Head: Normocephalic and atraumatic.   Eyes: Conjunctivae and EOM are normal. No scleral icterus.   Neck: Normal range of motion. Neck supple. No JVD present. No tracheal deviation present.   Cardiovascular: Normal rate, regular rhythm and normal heart sounds.  Exam reveals no gallop and no friction rub.    No murmur heard.  Pulmonary/Chest: Effort normal and breath sounds normal. No stridor. No respiratory distress. She has no wheezes. She has no rales.   Abdominal: Soft. She exhibits no distension and no mass. There is no tenderness. There is no rebound and no guarding.   Musculoskeletal: Normal range of motion. She exhibits no deformity.   Lymphadenopathy:     She has no cervical adenopathy.     She has no axillary adenopathy.        Right: No supraclavicular adenopathy present.        Left: No supraclavicular adenopathy present.   Neurological: She is alert and oriented to person, place, and time.   Skin: No rash noted. No erythema.   Psychiatric: She has a normal mood and affect. Her behavior is normal. Judgment and thought content normal.       Labs/Imaging:  -MRI brain performed 11/14/17, demonstrates no " metastatic disease.  There is microangiopathy and atrophy.    -CT/PET performed 11/6/17, personally reviewed, demonstrates a right upper lobe nodule with an SUV of 8.8.  No mediastinal lymphadenopathy.  Left adrenal gland 1 cm lesion with SUV 5.1.    -CT chest performed 10/30/17, personally reviewed, demonstrates 1.5 cm spiculated right upper lobe nodule.  No mediastinal lymphadenopathy.      Assessment/Plan:  Ms. Nix is a 77 year old  female who has a history of Stage 1B poorly differentiated adenocarcinoma of the lung status post left upper lobectomy, now with a new right upper lobe lung nodule and adrenal mass concerning for malignancy.  With no evidence of lymphadenopathy, contralateral location and the interval of presentation, I would assume this is a new primary malignancy separate from her original lung cancer.  I recommended that we repeat her pulmonary function studies.  With an isolated adrenal lesion, she is a reasonable candidate for surgical resection and definitive local therapy for her metastatic lesion with either surgical resection or cyberknife.   Assuming her pulmonary function studies are acceptable, will plan to proceed with bronchoscopy, right VATS, right upper lobe wedge resection with possible lobectomy and mediastinal lymph node dissection.  The risks and benefits of surgery were again discussed with the patient including pain, bleeding, infection, persistent air leak, myocardial infarction and death.  She understood these risks and wished to proceed with surgery.  In discussion, she would like to have the adrenal nodule surgically removed.      I, Dr. Jeremy Herrera, personally performed the services described in the documentation as scribed in my presence and the documentation is both accurate and complete.        Patient Active Problem List   Diagnosis   • Cancer of upper lobe of left lung     Signed by: Jeremy Herrera MD    11/21/2017    CC:  Hai Solomon,  MD    Scribed for Jeremy Herrera MD by Angella Nix. 11/21/2017  1:15 PM

## 2017-11-22 ENCOUNTER — PREP FOR SURGERY (OUTPATIENT)
Dept: OTHER | Facility: HOSPITAL | Age: 77
End: 2017-11-22

## 2017-11-22 DIAGNOSIS — R91.1 LUNG NODULE: Primary | ICD-10-CM

## 2017-11-22 RX ORDER — CHLORHEXIDINE GLUCONATE 500 MG/1
1 CLOTH TOPICAL EVERY 12 HOURS PRN
Status: CANCELLED | OUTPATIENT
Start: 2017-11-29

## 2017-11-29 ENCOUNTER — HOSPITAL ENCOUNTER (OUTPATIENT)
Dept: PULMONOLOGY | Facility: HOSPITAL | Age: 77
Discharge: HOME | End: 2017-11-29
Attending: THORACIC SURGERY (CARDIOTHORACIC VASCULAR SURGERY) | Admitting: THORACIC SURGERY (CARDIOTHORACIC VASCULAR SURGERY)

## 2017-11-29 ENCOUNTER — HOSPITAL ENCOUNTER (OUTPATIENT)
Dept: GENERAL RADIOLOGY | Facility: HOSPITAL | Age: 77
Discharge: HOME | End: 2017-11-29

## 2017-11-29 ENCOUNTER — APPOINTMENT (OUTPATIENT)
Dept: PREADMISSION TESTING | Facility: HOSPITAL | Age: 77
End: 2017-11-29

## 2017-11-29 VITALS — OXYGEN SATURATION: 95 % | HEIGHT: 65 IN | BODY MASS INDEX: 35.81 KG/M2 | WEIGHT: 214.95 LBS

## 2017-11-29 DIAGNOSIS — R91.1 LUNG NODULE: ICD-10-CM

## 2017-11-29 LAB
ABO GROUP BLD: NORMAL
ALBUMIN SERPL-MCNC: 4.3 G/DL (ref 3.2–4.8)
ALP SERPL-CCNC: 63 U/L (ref 25–100)
ALT SERPL W P-5'-P-CCNC: 33 U/L (ref 7–40)
ANION GAP SERPL CALCULATED.3IONS-SCNC: 4 MMOL/L (ref 3–11)
AST SERPL-CCNC: 27 U/L (ref 0–33)
BASOPHILS # BLD AUTO: 0.03 10*3/MM3 (ref 0–0.2)
BASOPHILS NFR BLD AUTO: 0.3 % (ref 0–1)
BILIRUB CONJ SERPL-MCNC: 0.1 MG/DL (ref 0–0.2)
BILIRUB INDIRECT SERPL-MCNC: 0.2 MG/DL (ref 0.1–1.1)
BILIRUB SERPL-MCNC: 0.3 MG/DL (ref 0.3–1.2)
BLD GP AB SCN SERPL QL: NEGATIVE
BUN BLD-MCNC: 35 MG/DL (ref 9–23)
BUN/CREAT SERPL: 29.2 (ref 7–25)
CALCIUM SPEC-SCNC: 9.5 MG/DL (ref 8.7–10.4)
CHLORIDE SERPL-SCNC: 105 MMOL/L (ref 99–109)
CO2 SERPL-SCNC: 31 MMOL/L (ref 20–31)
CREAT BLD-MCNC: 1.2 MG/DL (ref 0.6–1.3)
DEPRECATED RDW RBC AUTO: 47.8 FL (ref 37–54)
EOSINOPHIL # BLD AUTO: 0.44 10*3/MM3 (ref 0–0.3)
EOSINOPHIL NFR BLD AUTO: 4.8 % (ref 0–3)
ERYTHROCYTE [DISTWIDTH] IN BLOOD BY AUTOMATED COUNT: 13.9 % (ref 11.3–14.5)
GFR SERPL CREATININE-BSD FRML MDRD: 44 ML/MIN/1.73
GLUCOSE BLD-MCNC: 90 MG/DL (ref 70–100)
HBA1C MFR BLD: 6.1 % (ref 4.8–5.6)
HCT VFR BLD AUTO: 37.6 % (ref 34.5–44)
HGB BLD-MCNC: 11.8 G/DL (ref 11.5–15.5)
IMM GRANULOCYTES # BLD: 0.02 10*3/MM3 (ref 0–0.03)
IMM GRANULOCYTES NFR BLD: 0.2 % (ref 0–0.6)
INR PPP: 0.95
LYMPHOCYTES # BLD AUTO: 1.95 10*3/MM3 (ref 0.6–4.8)
LYMPHOCYTES NFR BLD AUTO: 21.1 % (ref 24–44)
MCH RBC QN AUTO: 29.5 PG (ref 27–31)
MCHC RBC AUTO-ENTMCNC: 31.4 G/DL (ref 32–36)
MCV RBC AUTO: 94 FL (ref 80–99)
MONOCYTES # BLD AUTO: 0.79 10*3/MM3 (ref 0–1)
MONOCYTES NFR BLD AUTO: 8.6 % (ref 0–12)
NEUTROPHILS # BLD AUTO: 5.99 10*3/MM3 (ref 1.5–8.3)
NEUTROPHILS NFR BLD AUTO: 65 % (ref 41–71)
PLATELET # BLD AUTO: 234 10*3/MM3 (ref 150–450)
PMV BLD AUTO: 10.3 FL (ref 6–12)
POTASSIUM BLD-SCNC: 4.4 MMOL/L (ref 3.5–5.5)
PROT SERPL-MCNC: 6.5 G/DL (ref 5.7–8.2)
PROTHROMBIN TIME: 10.3 SECONDS (ref 9.6–11.5)
RBC # BLD AUTO: 4 10*6/MM3 (ref 3.89–5.14)
RH BLD: POSITIVE
SODIUM BLD-SCNC: 140 MMOL/L (ref 132–146)
WBC NRBC COR # BLD: 9.22 10*3/MM3 (ref 3.5–10.8)

## 2017-11-29 PROCEDURE — 94729 DIFFUSING CAPACITY: CPT | Performed by: INTERNAL MEDICINE

## 2017-11-29 PROCEDURE — 94727 GAS DIL/WSHOT DETER LNG VOL: CPT | Performed by: INTERNAL MEDICINE

## 2017-11-29 PROCEDURE — 93010 ELECTROCARDIOGRAM REPORT: CPT | Performed by: INTERNAL MEDICINE

## 2017-11-29 PROCEDURE — 94060 EVALUATION OF WHEEZING: CPT | Performed by: INTERNAL MEDICINE

## 2017-11-29 PROCEDURE — 86920 COMPATIBILITY TEST SPIN: CPT

## 2017-11-29 RX ORDER — GUAIFENESIN 600 MG/1
1200 TABLET, EXTENDED RELEASE ORAL 2 TIMES DAILY
COMMUNITY

## 2017-11-29 RX ORDER — CHLORHEXIDINE GLUCONATE 500 MG/1
1 CLOTH TOPICAL EVERY 12 HOURS PRN
Status: DISCONTINUED | OUTPATIENT
Start: 2017-11-29 | End: 2022-05-10

## 2017-11-29 RX ORDER — SIMVASTATIN 80 MG
80 TABLET ORAL NIGHTLY
COMMUNITY
End: 2022-06-10

## 2017-11-29 RX ORDER — SIMETHICONE 125 MG
125 TABLET,CHEWABLE ORAL EVERY 6 HOURS PRN
COMMUNITY
End: 2022-05-10

## 2017-11-30 ENCOUNTER — APPOINTMENT (OUTPATIENT)
Dept: GENERAL RADIOLOGY | Facility: HOSPITAL | Age: 77
End: 2017-11-30

## 2017-11-30 ENCOUNTER — ANESTHESIA (OUTPATIENT)
Dept: PERIOP | Facility: HOSPITAL | Age: 77
End: 2017-11-30

## 2017-11-30 ENCOUNTER — ANESTHESIA EVENT (OUTPATIENT)
Dept: PERIOP | Facility: HOSPITAL | Age: 77
End: 2017-11-30

## 2017-11-30 ENCOUNTER — HOSPITAL ENCOUNTER (INPATIENT)
Facility: HOSPITAL | Age: 77
LOS: 4 days | Discharge: HOME | End: 2017-12-04
Attending: THORACIC SURGERY (CARDIOTHORACIC VASCULAR SURGERY) | Admitting: THORACIC SURGERY (CARDIOTHORACIC VASCULAR SURGERY)

## 2017-11-30 DIAGNOSIS — R91.1 LUNG NODULE: ICD-10-CM

## 2017-11-30 PROBLEM — Z90.2 S/P LOBECTOMY OF LUNG: Status: ACTIVE | Noted: 2017-11-30

## 2017-11-30 PROBLEM — I10 HTN (HYPERTENSION): Status: ACTIVE | Noted: 2017-11-30

## 2017-11-30 PROBLEM — E78.00 HYPERCHOLESTEREMIA: Status: ACTIVE | Noted: 2017-11-30

## 2017-11-30 PROBLEM — K21.9 GERD (GASTROESOPHAGEAL REFLUX DISEASE): Status: ACTIVE | Noted: 2017-11-30

## 2017-11-30 LAB
BACTERIA UR QL AUTO: ABNORMAL /HPF
BILIRUB UR QL STRIP: NEGATIVE
CLARITY UR: CLEAR
COLOR UR: YELLOW
GLUCOSE UR STRIP-MCNC: NEGATIVE MG/DL
HGB UR QL STRIP.AUTO: ABNORMAL
HYALINE CASTS UR QL AUTO: ABNORMAL /LPF
KETONES UR QL STRIP: ABNORMAL
LEUKOCYTE ESTERASE UR QL STRIP.AUTO: NEGATIVE
NITRITE UR QL STRIP: NEGATIVE
PH UR STRIP.AUTO: 5.5 [PH] (ref 5–8)
PROT UR QL STRIP: NEGATIVE
RBC # UR: ABNORMAL /HPF
REF LAB TEST METHOD: ABNORMAL
SP GR UR STRIP: 1.02 (ref 1–1.03)
SQUAMOUS #/AREA URNS HPF: ABNORMAL /HPF
UROBILINOGEN UR QL STRIP: ABNORMAL
WBC UR QL AUTO: ABNORMAL /HPF

## 2017-11-30 PROCEDURE — 25010000003 CEFAZOLIN IN DEXTROSE 2-4 GM/100ML-% SOLUTION: Performed by: PHYSICIAN ASSISTANT

## 2017-11-30 PROCEDURE — 88305 TISSUE EXAM BY PATHOLOGIST: CPT | Performed by: THORACIC SURGERY (CARDIOTHORACIC VASCULAR SURGERY)

## 2017-11-30 PROCEDURE — 25010000002 NEOSTIGMINE 10 MG/10ML SOLUTION: Performed by: NURSE ANESTHETIST, CERTIFIED REGISTERED

## 2017-11-30 PROCEDURE — 32663 THORACOSCOPY W/LOBECTOMY: CPT | Performed by: PHYSICIAN ASSISTANT

## 2017-11-30 PROCEDURE — 25010000002 ONDANSETRON PER 1 MG: Performed by: PHYSICIAN ASSISTANT

## 2017-11-30 PROCEDURE — 94799 UNLISTED PULMONARY SVC/PX: CPT

## 2017-11-30 PROCEDURE — 25010000003 CEFAZOLIN IN DEXTROSE 2-4 GM/100ML-% SOLUTION: Performed by: THORACIC SURGERY (CARDIOTHORACIC VASCULAR SURGERY)

## 2017-11-30 PROCEDURE — 32674 THORACOSCOPY LYMPH NODE EXC: CPT | Performed by: THORACIC SURGERY (CARDIOTHORACIC VASCULAR SURGERY)

## 2017-11-30 PROCEDURE — 25010000002 MORPHINE SULFATE (PF) 2 MG/ML SOLUTION: Performed by: THORACIC SURGERY (CARDIOTHORACIC VASCULAR SURGERY)

## 2017-11-30 PROCEDURE — 25010000002 PROPOFOL 10 MG/ML EMULSION: Performed by: NURSE ANESTHETIST, CERTIFIED REGISTERED

## 2017-11-30 PROCEDURE — 88331 PATH CONSLTJ SURG 1 BLK 1SPC: CPT | Performed by: THORACIC SURGERY (CARDIOTHORACIC VASCULAR SURGERY)

## 2017-11-30 PROCEDURE — 25010000002 FENTANYL CITRATE (PF) 100 MCG/2ML SOLUTION: Performed by: NURSE ANESTHETIST, CERTIFIED REGISTERED

## 2017-11-30 PROCEDURE — 07B74ZX EXCISION OF THORAX LYMPHATIC, PERCUTANEOUS ENDOSCOPIC APPROACH, DIAGNOSTIC: ICD-10-PCS | Performed by: THORACIC SURGERY (CARDIOTHORACIC VASCULAR SURGERY)

## 2017-11-30 PROCEDURE — 31622 DX BRONCHOSCOPE/WASH: CPT | Performed by: THORACIC SURGERY (CARDIOTHORACIC VASCULAR SURGERY)

## 2017-11-30 PROCEDURE — 0BTC4ZZ RESECTION OF RIGHT UPPER LUNG LOBE, PERCUTANEOUS ENDOSCOPIC APPROACH: ICD-10-PCS | Performed by: THORACIC SURGERY (CARDIOTHORACIC VASCULAR SURGERY)

## 2017-11-30 PROCEDURE — 87086 URINE CULTURE/COLONY COUNT: CPT | Performed by: THORACIC SURGERY (CARDIOTHORACIC VASCULAR SURGERY)

## 2017-11-30 PROCEDURE — 88307 TISSUE EXAM BY PATHOLOGIST: CPT | Performed by: THORACIC SURGERY (CARDIOTHORACIC VASCULAR SURGERY)

## 2017-11-30 PROCEDURE — 88309 TISSUE EXAM BY PATHOLOGIST: CPT | Performed by: THORACIC SURGERY (CARDIOTHORACIC VASCULAR SURGERY)

## 2017-11-30 PROCEDURE — 25010000002 DEXAMETHASONE PER 1 MG: Performed by: NURSE ANESTHETIST, CERTIFIED REGISTERED

## 2017-11-30 PROCEDURE — 71010 HC CHEST PA OR AP: CPT

## 2017-11-30 PROCEDURE — 25010000002 ONDANSETRON PER 1 MG: Performed by: NURSE ANESTHETIST, CERTIFIED REGISTERED

## 2017-11-30 PROCEDURE — 0BJ08ZZ INSPECTION OF TRACHEOBRONCHIAL TREE, VIA NATURAL OR ARTIFICIAL OPENING ENDOSCOPIC: ICD-10-PCS | Performed by: THORACIC SURGERY (CARDIOTHORACIC VASCULAR SURGERY)

## 2017-11-30 PROCEDURE — 99233 SBSQ HOSP IP/OBS HIGH 50: CPT | Performed by: INTERNAL MEDICINE

## 2017-11-30 PROCEDURE — 94760 N-INVAS EAR/PLS OXIMETRY 1: CPT

## 2017-11-30 PROCEDURE — 32663 THORACOSCOPY W/LOBECTOMY: CPT | Performed by: THORACIC SURGERY (CARDIOTHORACIC VASCULAR SURGERY)

## 2017-11-30 PROCEDURE — 81001 URINALYSIS AUTO W/SCOPE: CPT | Performed by: THORACIC SURGERY (CARDIOTHORACIC VASCULAR SURGERY)

## 2017-11-30 RX ORDER — SODIUM CHLORIDE, SODIUM LACTATE, POTASSIUM CHLORIDE, CALCIUM CHLORIDE 600; 310; 30; 20 MG/100ML; MG/100ML; MG/100ML; MG/100ML
9 INJECTION, SOLUTION INTRAVENOUS CONTINUOUS
Status: DISCONTINUED | OUTPATIENT
Start: 2017-11-30 | End: 2017-12-01

## 2017-11-30 RX ORDER — ATORVASTATIN CALCIUM 40 MG/1
40 TABLET, FILM COATED ORAL NIGHTLY
Status: DISCONTINUED | OUTPATIENT
Start: 2017-11-30 | End: 2017-12-04 | Stop reason: HOSPADM

## 2017-11-30 RX ORDER — LIDOCAINE HYDROCHLORIDE 10 MG/ML
0.5 INJECTION, SOLUTION EPIDURAL; INFILTRATION; INTRACAUDAL; PERINEURAL ONCE AS NEEDED
Status: COMPLETED | OUTPATIENT
Start: 2017-11-30 | End: 2017-11-30

## 2017-11-30 RX ORDER — IPRATROPIUM BROMIDE AND ALBUTEROL SULFATE 2.5; .5 MG/3ML; MG/3ML
3 SOLUTION RESPIRATORY (INHALATION) EVERY 6 HOURS PRN
Status: DISCONTINUED | OUTPATIENT
Start: 2017-11-30 | End: 2017-12-04 | Stop reason: HOSPADM

## 2017-11-30 RX ORDER — BUPIVACAINE HYDROCHLORIDE AND EPINEPHRINE 5; 5 MG/ML; UG/ML
INJECTION, SOLUTION PERINEURAL AS NEEDED
Status: DISCONTINUED | OUTPATIENT
Start: 2017-11-30 | End: 2017-11-30 | Stop reason: HOSPADM

## 2017-11-30 RX ORDER — PHENYLEPHRINE HCL IN 0.9% NACL 0.5 MG/5ML
.5-3 SYRINGE (ML) INTRAVENOUS
Status: DISCONTINUED | OUTPATIENT
Start: 2017-11-30 | End: 2017-12-01

## 2017-11-30 RX ORDER — LOSARTAN POTASSIUM AND HYDROCHLOROTHIAZIDE 12.5; 5 MG/1; MG/1
1 TABLET ORAL
Status: DISCONTINUED | OUTPATIENT
Start: 2017-12-01 | End: 2017-12-04 | Stop reason: HOSPADM

## 2017-11-30 RX ORDER — DEXAMETHASONE SODIUM PHOSPHATE 4 MG/ML
INJECTION, SOLUTION INTRA-ARTICULAR; INTRALESIONAL; INTRAMUSCULAR; INTRAVENOUS; SOFT TISSUE AS NEEDED
Status: DISCONTINUED | OUTPATIENT
Start: 2017-11-30 | End: 2017-11-30 | Stop reason: SURG

## 2017-11-30 RX ORDER — MAGNESIUM HYDROXIDE 1200 MG/15ML
LIQUID ORAL AS NEEDED
Status: DISCONTINUED | OUTPATIENT
Start: 2017-11-30 | End: 2017-11-30 | Stop reason: HOSPADM

## 2017-11-30 RX ORDER — FAMOTIDINE 20 MG/1
20 TABLET, FILM COATED ORAL ONCE
Status: COMPLETED | OUTPATIENT
Start: 2017-11-30 | End: 2017-11-30

## 2017-11-30 RX ORDER — ACETAMINOPHEN 650 MG/1
650 SUPPOSITORY RECTAL EVERY 4 HOURS PRN
Status: DISCONTINUED | OUTPATIENT
Start: 2017-11-30 | End: 2017-12-04 | Stop reason: HOSPADM

## 2017-11-30 RX ORDER — CEFAZOLIN SODIUM 2 G/100ML
2 INJECTION, SOLUTION INTRAVENOUS EVERY 8 HOURS
Status: COMPLETED | OUTPATIENT
Start: 2017-11-30 | End: 2017-12-01

## 2017-11-30 RX ORDER — HYDROMORPHONE HYDROCHLORIDE 1 MG/ML
0.5 INJECTION, SOLUTION INTRAMUSCULAR; INTRAVENOUS; SUBCUTANEOUS
Status: DISCONTINUED | OUTPATIENT
Start: 2017-11-30 | End: 2017-11-30 | Stop reason: HOSPADM

## 2017-11-30 RX ORDER — GLYCOPYRROLATE 0.2 MG/ML
INJECTION INTRAMUSCULAR; INTRAVENOUS AS NEEDED
Status: DISCONTINUED | OUTPATIENT
Start: 2017-11-30 | End: 2017-11-30 | Stop reason: SURG

## 2017-11-30 RX ORDER — MORPHINE SULFATE 10 MG/ML
2 INJECTION INTRAMUSCULAR; INTRAVENOUS; SUBCUTANEOUS EVERY 4 HOURS PRN
Status: DISCONTINUED | OUTPATIENT
Start: 2017-11-30 | End: 2017-11-30

## 2017-11-30 RX ORDER — CETIRIZINE HYDROCHLORIDE 10 MG/1
10 TABLET ORAL DAILY
Status: DISCONTINUED | OUTPATIENT
Start: 2017-12-01 | End: 2017-12-04 | Stop reason: HOSPADM

## 2017-11-30 RX ORDER — DOCUSATE SODIUM 100 MG/1
100 CAPSULE, LIQUID FILLED ORAL DAILY
Status: DISCONTINUED | OUTPATIENT
Start: 2017-12-01 | End: 2017-12-04 | Stop reason: HOSPADM

## 2017-11-30 RX ORDER — ONDANSETRON 2 MG/ML
INJECTION INTRAMUSCULAR; INTRAVENOUS AS NEEDED
Status: DISCONTINUED | OUTPATIENT
Start: 2017-11-30 | End: 2017-11-30 | Stop reason: SURG

## 2017-11-30 RX ORDER — FAMOTIDINE 10 MG/ML
20 INJECTION, SOLUTION INTRAVENOUS ONCE
Status: DISCONTINUED | OUTPATIENT
Start: 2017-11-30 | End: 2017-11-30

## 2017-11-30 RX ORDER — PROPOFOL 10 MG/ML
VIAL (ML) INTRAVENOUS AS NEEDED
Status: DISCONTINUED | OUTPATIENT
Start: 2017-11-30 | End: 2017-11-30 | Stop reason: SURG

## 2017-11-30 RX ORDER — LIDOCAINE HYDROCHLORIDE 10 MG/ML
INJECTION, SOLUTION EPIDURAL; INFILTRATION; INTRACAUDAL; PERINEURAL AS NEEDED
Status: DISCONTINUED | OUTPATIENT
Start: 2017-11-30 | End: 2017-11-30 | Stop reason: SURG

## 2017-11-30 RX ORDER — ACETAMINOPHEN 325 MG/1
650 TABLET ORAL EVERY 4 HOURS PRN
Status: DISCONTINUED | OUTPATIENT
Start: 2017-11-30 | End: 2017-12-04 | Stop reason: HOSPADM

## 2017-11-30 RX ORDER — DIPHENOXYLATE HYDROCHLORIDE AND ATROPINE SULFATE 2.5; .025 MG/1; MG/1
1 TABLET ORAL DAILY
Status: DISCONTINUED | OUTPATIENT
Start: 2017-12-01 | End: 2017-12-04 | Stop reason: HOSPADM

## 2017-11-30 RX ORDER — FENTANYL CITRATE 50 UG/ML
INJECTION, SOLUTION INTRAMUSCULAR; INTRAVENOUS AS NEEDED
Status: DISCONTINUED | OUTPATIENT
Start: 2017-11-30 | End: 2017-11-30 | Stop reason: SURG

## 2017-11-30 RX ORDER — NEOSTIGMINE METHYLSULFATE 1 MG/ML
INJECTION, SOLUTION INTRAVENOUS AS NEEDED
Status: DISCONTINUED | OUTPATIENT
Start: 2017-11-30 | End: 2017-11-30 | Stop reason: SURG

## 2017-11-30 RX ORDER — GUAIFENESIN 600 MG/1
1200 TABLET, EXTENDED RELEASE ORAL EVERY 12 HOURS SCHEDULED
Status: DISCONTINUED | OUTPATIENT
Start: 2017-11-30 | End: 2017-12-04 | Stop reason: HOSPADM

## 2017-11-30 RX ORDER — ASPIRIN 81 MG/1
81 TABLET, CHEWABLE ORAL DAILY
Status: DISCONTINUED | OUTPATIENT
Start: 2017-12-01 | End: 2017-12-04 | Stop reason: HOSPADM

## 2017-11-30 RX ORDER — MORPHINE SULFATE 2 MG/ML
2 INJECTION, SOLUTION INTRAMUSCULAR; INTRAVENOUS EVERY 4 HOURS PRN
Status: DISCONTINUED | OUTPATIENT
Start: 2017-11-30 | End: 2017-12-01

## 2017-11-30 RX ORDER — FENTANYL CITRATE 50 UG/ML
50 INJECTION, SOLUTION INTRAMUSCULAR; INTRAVENOUS
Status: DISCONTINUED | OUTPATIENT
Start: 2017-11-30 | End: 2017-11-30 | Stop reason: HOSPADM

## 2017-11-30 RX ORDER — BUDESONIDE AND FORMOTEROL FUMARATE DIHYDRATE 160; 4.5 UG/1; UG/1
2 AEROSOL RESPIRATORY (INHALATION)
Status: DISCONTINUED | OUTPATIENT
Start: 2017-11-30 | End: 2017-12-04 | Stop reason: HOSPADM

## 2017-11-30 RX ORDER — ONDANSETRON 4 MG/1
4 TABLET, FILM COATED ORAL EVERY 6 HOURS PRN
Status: DISCONTINUED | OUTPATIENT
Start: 2017-11-30 | End: 2017-12-04 | Stop reason: HOSPADM

## 2017-11-30 RX ORDER — SODIUM CHLORIDE 0.9 % (FLUSH) 0.9 %
1-10 SYRINGE (ML) INJECTION AS NEEDED
Status: DISCONTINUED | OUTPATIENT
Start: 2017-11-30 | End: 2017-12-04 | Stop reason: HOSPADM

## 2017-11-30 RX ORDER — ONDANSETRON 2 MG/ML
4 INJECTION INTRAMUSCULAR; INTRAVENOUS EVERY 6 HOURS PRN
Status: DISCONTINUED | OUTPATIENT
Start: 2017-11-30 | End: 2017-12-04 | Stop reason: HOSPADM

## 2017-11-30 RX ORDER — SODIUM CHLORIDE 0.9 % (FLUSH) 0.9 %
1-10 SYRINGE (ML) INJECTION AS NEEDED
Status: DISCONTINUED | OUTPATIENT
Start: 2017-11-30 | End: 2017-11-30 | Stop reason: HOSPADM

## 2017-11-30 RX ORDER — CEFAZOLIN SODIUM 2 G/100ML
2 INJECTION, SOLUTION INTRAVENOUS ONCE
Status: COMPLETED | OUTPATIENT
Start: 2017-11-30 | End: 2017-11-30

## 2017-11-30 RX ORDER — HEPARIN SODIUM 5000 [USP'U]/ML
5000 INJECTION, SOLUTION INTRAVENOUS; SUBCUTANEOUS EVERY 12 HOURS SCHEDULED
Status: DISCONTINUED | OUTPATIENT
Start: 2017-12-01 | End: 2017-12-04 | Stop reason: HOSPADM

## 2017-11-30 RX ORDER — HYDROCODONE BITARTRATE AND ACETAMINOPHEN 7.5; 325 MG/1; MG/1
1 TABLET ORAL EVERY 6 HOURS PRN
Status: DISCONTINUED | OUTPATIENT
Start: 2017-11-30 | End: 2017-12-04 | Stop reason: HOSPADM

## 2017-11-30 RX ORDER — FAMOTIDINE 20 MG/1
20 TABLET, FILM COATED ORAL DAILY
Status: DISCONTINUED | OUTPATIENT
Start: 2017-12-01 | End: 2017-12-04 | Stop reason: HOSPADM

## 2017-11-30 RX ORDER — ATRACURIUM BESYLATE 10 MG/ML
INJECTION, SOLUTION INTRAVENOUS AS NEEDED
Status: DISCONTINUED | OUTPATIENT
Start: 2017-11-30 | End: 2017-11-30 | Stop reason: SURG

## 2017-11-30 RX ADMIN — FENTANYL CITRATE 50 MCG: 50 INJECTION, SOLUTION INTRAMUSCULAR; INTRAVENOUS at 17:05

## 2017-11-30 RX ADMIN — FAMOTIDINE 20 MG: 20 TABLET, FILM COATED ORAL at 10:15

## 2017-11-30 RX ADMIN — LIDOCAINE HYDROCHLORIDE 0.2 ML: 10 INJECTION, SOLUTION EPIDURAL; INFILTRATION; INTRACAUDAL; PERINEURAL at 10:10

## 2017-11-30 RX ADMIN — ATRACURIUM BESYLATE 50 MG: 10 INJECTION, SOLUTION INTRAVENOUS at 13:39

## 2017-11-30 RX ADMIN — CEFAZOLIN SODIUM 2 G: 2 INJECTION, SOLUTION INTRAVENOUS at 23:03

## 2017-11-30 RX ADMIN — SODIUM CHLORIDE, POTASSIUM CHLORIDE, SODIUM LACTATE AND CALCIUM CHLORIDE 9 ML/HR: 600; 310; 30; 20 INJECTION, SOLUTION INTRAVENOUS at 10:10

## 2017-11-30 RX ADMIN — DEXAMETHASONE SODIUM PHOSPHATE 8 MG: 4 INJECTION, SOLUTION INTRAMUSCULAR; INTRAVENOUS at 13:50

## 2017-11-30 RX ADMIN — ONDANSETRON 4 MG: 2 INJECTION INTRAMUSCULAR; INTRAVENOUS at 16:44

## 2017-11-30 RX ADMIN — FENTANYL CITRATE 50 MCG: 50 INJECTION INTRAMUSCULAR; INTRAVENOUS at 17:35

## 2017-11-30 RX ADMIN — NEOSTIGMINE METHYLSULFATE 3 MG: 1 INJECTION, SOLUTION INTRAVENOUS at 16:52

## 2017-11-30 RX ADMIN — FENTANYL CITRATE 50 MCG: 50 INJECTION, SOLUTION INTRAMUSCULAR; INTRAVENOUS at 14:24

## 2017-11-30 RX ADMIN — SODIUM CHLORIDE, POTASSIUM CHLORIDE, SODIUM LACTATE AND CALCIUM CHLORIDE: 600; 310; 30; 20 INJECTION, SOLUTION INTRAVENOUS at 13:30

## 2017-11-30 RX ADMIN — ATRACURIUM BESYLATE 10 MG: 10 INJECTION, SOLUTION INTRAVENOUS at 14:25

## 2017-11-30 RX ADMIN — FENTANYL CITRATE 100 MCG: 50 INJECTION, SOLUTION INTRAMUSCULAR; INTRAVENOUS at 13:38

## 2017-11-30 RX ADMIN — ONDANSETRON 4 MG: 2 INJECTION INTRAMUSCULAR; INTRAVENOUS at 19:13

## 2017-11-30 RX ADMIN — BUDESONIDE AND FORMOTEROL FUMARATE DIHYDRATE 2 PUFF: 160; 4.5 AEROSOL RESPIRATORY (INHALATION) at 21:31

## 2017-11-30 RX ADMIN — Medication 2 MG: at 23:03

## 2017-11-30 RX ADMIN — FENTANYL CITRATE 50 MCG: 50 INJECTION, SOLUTION INTRAMUSCULAR; INTRAVENOUS at 17:13

## 2017-11-30 RX ADMIN — PROPOFOL 150 MG: 10 INJECTION, EMULSION INTRAVENOUS at 13:39

## 2017-11-30 RX ADMIN — Medication 2 MG: at 18:48

## 2017-11-30 RX ADMIN — CEFAZOLIN SODIUM 2 G: 2 INJECTION, SOLUTION INTRAVENOUS at 13:30

## 2017-11-30 RX ADMIN — LIDOCAINE HYDROCHLORIDE 50 MG: 10 INJECTION, SOLUTION EPIDURAL; INFILTRATION; INTRACAUDAL; PERINEURAL at 13:39

## 2017-11-30 RX ADMIN — HYDROCODONE BITARTRATE AND ACETAMINOPHEN 1 TABLET: 7.5; 325 TABLET ORAL at 18:43

## 2017-11-30 RX ADMIN — GLYCOPYRROLATE 0.4 MG: 0.2 INJECTION, SOLUTION INTRAMUSCULAR; INTRAVENOUS at 16:52

## 2017-11-30 RX ADMIN — ATRACURIUM BESYLATE 10 MG: 10 INJECTION, SOLUTION INTRAVENOUS at 15:30

## 2017-11-30 RX ADMIN — ATRACURIUM BESYLATE 15 MG: 10 INJECTION, SOLUTION INTRAVENOUS at 15:59

## 2017-11-30 RX ADMIN — FENTANYL CITRATE 50 MCG: 50 INJECTION INTRAMUSCULAR; INTRAVENOUS at 17:55

## 2017-12-01 ENCOUNTER — APPOINTMENT (OUTPATIENT)
Dept: GENERAL RADIOLOGY | Facility: HOSPITAL | Age: 77
End: 2017-12-01

## 2017-12-01 LAB
ANION GAP SERPL CALCULATED.3IONS-SCNC: 8 MMOL/L (ref 3–11)
BASOPHILS # BLD AUTO: 0 10*3/MM3 (ref 0–0.2)
BASOPHILS NFR BLD AUTO: 0 % (ref 0–1)
BUN BLD-MCNC: 24 MG/DL (ref 9–23)
BUN/CREAT SERPL: 20 (ref 7–25)
CALCIUM SPEC-SCNC: 9.2 MG/DL (ref 8.7–10.4)
CHLORIDE SERPL-SCNC: 103 MMOL/L (ref 99–109)
CO2 SERPL-SCNC: 27 MMOL/L (ref 20–31)
CREAT BLD-MCNC: 1.2 MG/DL (ref 0.6–1.3)
DEPRECATED RDW RBC AUTO: 47.9 FL (ref 37–54)
EOSINOPHIL # BLD AUTO: 0 10*3/MM3 (ref 0–0.3)
EOSINOPHIL NFR BLD AUTO: 0 % (ref 0–3)
ERYTHROCYTE [DISTWIDTH] IN BLOOD BY AUTOMATED COUNT: 13.9 % (ref 11.3–14.5)
GFR SERPL CREATININE-BSD FRML MDRD: 44 ML/MIN/1.73
GLUCOSE BLD-MCNC: 152 MG/DL (ref 70–100)
HCT VFR BLD AUTO: 36.6 % (ref 34.5–44)
HGB BLD-MCNC: 11.5 G/DL (ref 11.5–15.5)
IMM GRANULOCYTES # BLD: 0.04 10*3/MM3 (ref 0–0.03)
IMM GRANULOCYTES NFR BLD: 0.3 % (ref 0–0.6)
LYMPHOCYTES # BLD AUTO: 0.59 10*3/MM3 (ref 0.6–4.8)
LYMPHOCYTES NFR BLD AUTO: 4.4 % (ref 24–44)
MCH RBC QN AUTO: 29.6 PG (ref 27–31)
MCHC RBC AUTO-ENTMCNC: 31.4 G/DL (ref 32–36)
MCV RBC AUTO: 94.3 FL (ref 80–99)
MONOCYTES # BLD AUTO: 1.43 10*3/MM3 (ref 0–1)
MONOCYTES NFR BLD AUTO: 10.6 % (ref 0–12)
NEUTROPHILS # BLD AUTO: 11.49 10*3/MM3 (ref 1.5–8.3)
NEUTROPHILS NFR BLD AUTO: 84.7 % (ref 41–71)
PLATELET # BLD AUTO: 214 10*3/MM3 (ref 150–450)
PMV BLD AUTO: 10.4 FL (ref 6–12)
POTASSIUM BLD-SCNC: 4.6 MMOL/L (ref 3.5–5.5)
RBC # BLD AUTO: 3.88 10*6/MM3 (ref 3.89–5.14)
SODIUM BLD-SCNC: 138 MMOL/L (ref 132–146)
WBC NRBC COR # BLD: 13.55 10*3/MM3 (ref 3.5–10.8)

## 2017-12-01 PROCEDURE — 25010000002 MORPHINE SULFATE (PF) 2 MG/ML SOLUTION: Performed by: THORACIC SURGERY (CARDIOTHORACIC VASCULAR SURGERY)

## 2017-12-01 PROCEDURE — 25010000002 ONDANSETRON PER 1 MG: Performed by: PHYSICIAN ASSISTANT

## 2017-12-01 PROCEDURE — 80048 BASIC METABOLIC PNL TOTAL CA: CPT | Performed by: NURSE PRACTITIONER

## 2017-12-01 PROCEDURE — 85025 COMPLETE CBC W/AUTO DIFF WBC: CPT | Performed by: PHYSICIAN ASSISTANT

## 2017-12-01 PROCEDURE — 94640 AIRWAY INHALATION TREATMENT: CPT

## 2017-12-01 PROCEDURE — 25010000003 CEFAZOLIN IN DEXTROSE 2-4 GM/100ML-% SOLUTION: Performed by: PHYSICIAN ASSISTANT

## 2017-12-01 PROCEDURE — 71010 HC CHEST PA OR AP: CPT

## 2017-12-01 PROCEDURE — 25010000002 HEPARIN (PORCINE) PER 1000 UNITS: Performed by: PHYSICIAN ASSISTANT

## 2017-12-01 PROCEDURE — 25010000002 ONDANSETRON PER 1 MG: Performed by: NURSE PRACTITIONER

## 2017-12-01 PROCEDURE — 94799 UNLISTED PULMONARY SVC/PX: CPT

## 2017-12-01 RX ADMIN — DOCUSATE SODIUM 100 MG: 100 CAPSULE, LIQUID FILLED ORAL at 08:04

## 2017-12-01 RX ADMIN — HEPARIN SODIUM 5000 UNITS: 5000 INJECTION, SOLUTION INTRAVENOUS; SUBCUTANEOUS at 08:04

## 2017-12-01 RX ADMIN — ONDANSETRON 4 MG: 2 INJECTION INTRAMUSCULAR; INTRAVENOUS at 02:35

## 2017-12-01 RX ADMIN — Medication 2 MG: at 08:40

## 2017-12-01 RX ADMIN — GUAIFENESIN 1200 MG: 600 TABLET, EXTENDED RELEASE ORAL at 08:04

## 2017-12-01 RX ADMIN — ACETAMINOPHEN 650 MG: 325 TABLET ORAL at 14:27

## 2017-12-01 RX ADMIN — ACETAMINOPHEN 650 MG: 325 TABLET ORAL at 18:11

## 2017-12-01 RX ADMIN — ONDANSETRON 4 MG: 2 INJECTION INTRAMUSCULAR; INTRAVENOUS at 08:37

## 2017-12-01 RX ADMIN — HEPARIN SODIUM 5000 UNITS: 5000 INJECTION, SOLUTION INTRAVENOUS; SUBCUTANEOUS at 21:18

## 2017-12-01 RX ADMIN — BUDESONIDE AND FORMOTEROL FUMARATE DIHYDRATE 2 PUFF: 160; 4.5 AEROSOL RESPIRATORY (INHALATION) at 09:01

## 2017-12-01 RX ADMIN — FAMOTIDINE 20 MG: 20 TABLET, FILM COATED ORAL at 08:04

## 2017-12-01 RX ADMIN — Medication 1 TABLET: at 08:04

## 2017-12-01 RX ADMIN — CEFAZOLIN SODIUM 2 G: 2 INJECTION, SOLUTION INTRAVENOUS at 05:14

## 2017-12-01 RX ADMIN — GUAIFENESIN 1200 MG: 600 TABLET, EXTENDED RELEASE ORAL at 21:18

## 2017-12-01 RX ADMIN — ATORVASTATIN CALCIUM 40 MG: 40 TABLET, FILM COATED ORAL at 21:18

## 2017-12-01 RX ADMIN — ASPIRIN 81 MG 81 MG: 81 TABLET ORAL at 08:04

## 2017-12-01 RX ADMIN — CETIRIZINE HYDROCHLORIDE 10 MG: 10 TABLET, FILM COATED ORAL at 08:04

## 2017-12-01 RX ADMIN — LOSARTAN POTASSIUM AND HYDROCHLOROTHIAZIDE 1 TABLET: 12.5; 5 TABLET ORAL at 08:50

## 2017-12-01 NOTE — PLAN OF CARE
Problem: Patient Care Overview (Adult)  Goal: Plan of Care Review  Outcome: Ongoing (interventions implemented as appropriate)  N/v, no appetite, CT 20sx, o2 at 3.5L    11/30/17 1002 11/30/17 2000   Coping/Psychosocial Response Interventions   Plan Of Care Reviewed With --  patient;family   Patient Care Overview   Progress improving --        Goal: Adult Individualization and Mutuality  Outcome: Ongoing (interventions implemented as appropriate)  Goal: Discharge Needs Assessment  Outcome: Ongoing (interventions implemented as appropriate)    Problem: Perioperative Period (Adult)  Goal: Signs and Symptoms of Listed Potential Problems Will be Absent or Manageable (Perioperative Period)  Outcome: Ongoing (interventions implemented as appropriate)    Problem: Fall Risk (Adult)  Goal: Identify Related Risk Factors and Signs and Symptoms  Outcome: Ongoing (interventions implemented as appropriate)  Goal: Absence of Falls  Outcome: Ongoing (interventions implemented as appropriate)    Problem: Pain, Acute (Adult)  Goal: Identify Related Risk Factors and Signs and Symptoms  Outcome: Ongoing (interventions implemented as appropriate)  Goal: Acceptable Pain Control/Comfort Level  Outcome: Ongoing (interventions implemented as appropriate)

## 2017-12-01 NOTE — PROGRESS NOTES
Multidisciplinary Rounds    Time: 20min  Patient Name: America Nix  Date of Encounter: 12/01/17 11:21 AM  MRN: [id number]  Admission date: 11/30/2017      Reason for visit: MDR.     Additional information obtained during MDR:  Pt not able to eat d/t nausea; medication ordered.    Current diet: Diet Regular      Intervention:  Follow treatment plan  Care plan reviewed    Follow up:   RD to follow per protocol.      Angella Posey RD  11:21 AM

## 2017-12-01 NOTE — PROGRESS NOTES
Cardiothoracic Surgery Progress Note      POD # 1 s/p Right VATS with Right Upper Lobectomy       LOS: 1 day      Subjective:  Patient states she is doing well with minimal incisional pain and denies any dyspnea.  Patient does state she did experience some nausea early this morning which has now resolved.      Objective:  Vital Signs  Temp:  [96 °F (35.6 °C)-98.7 °F (37.1 °C)] 98.7 °F (37.1 °C)  Heart Rate:  [68-91] 78  Resp:  [16-24] 24  BP: ()/(44-96) 124/66    Physical Exam:   General Appearance: alert, appears stated age and cooperative   Lungs: Decreased bibasilar breath sounds no wheezes, rales or rhonchi.  No air leak.   Heart: Regular rate and rhythm with no murmurs, rubs or gallops   Skin: Incision c/d/i   CT Output: 309 mL/18 hours           218 mL/12 hours  Results:    Results from last 7 days  Lab Units 12/01/17  0400   WBC 10*3/mm3 13.55*   HEMOGLOBIN g/dL 11.5   HEMATOCRIT % 36.6   PLATELETS 10*3/mm3 214       Results from last 7 days  Lab Units 12/01/17  0400   SODIUM mmol/L 138   POTASSIUM mmol/L 4.6   CHLORIDE mmol/L 103   CO2 mmol/L 27.0   BUN mg/dL 24*   CREATININE mg/dL 1.20   GLUCOSE mg/dL 152*   CALCIUM mg/dL 9.2     CXR: Right chest tubes in place, poor lung volumes, no identifiable pneumothorax    Assessment:  POD # 1 s/p Right VATS with Right Upper Lobectomy  Good progress    Plan:  Await final pathology  Ambulate  Aggressive pulmonary toilet  Transfer to telemetry  Continue chest tubes

## 2017-12-02 ENCOUNTER — APPOINTMENT (OUTPATIENT)
Dept: GENERAL RADIOLOGY | Facility: HOSPITAL | Age: 77
End: 2017-12-02

## 2017-12-02 LAB — BACTERIA SPEC AEROBE CULT: NORMAL

## 2017-12-02 PROCEDURE — 94799 UNLISTED PULMONARY SVC/PX: CPT

## 2017-12-02 PROCEDURE — 94640 AIRWAY INHALATION TREATMENT: CPT

## 2017-12-02 PROCEDURE — 94760 N-INVAS EAR/PLS OXIMETRY 1: CPT

## 2017-12-02 PROCEDURE — 25010000002 HEPARIN (PORCINE) PER 1000 UNITS: Performed by: PHYSICIAN ASSISTANT

## 2017-12-02 PROCEDURE — 71010 HC CHEST PA OR AP: CPT

## 2017-12-02 RX ORDER — BUMETANIDE 0.25 MG/ML
2 INJECTION INTRAMUSCULAR; INTRAVENOUS ONCE
Status: COMPLETED | OUTPATIENT
Start: 2017-12-02 | End: 2017-12-02

## 2017-12-02 RX ADMIN — ACETAMINOPHEN 650 MG: 325 TABLET ORAL at 14:34

## 2017-12-02 RX ADMIN — CETIRIZINE HYDROCHLORIDE 10 MG: 10 TABLET, FILM COATED ORAL at 10:30

## 2017-12-02 RX ADMIN — GUAIFENESIN 1200 MG: 600 TABLET, EXTENDED RELEASE ORAL at 10:31

## 2017-12-02 RX ADMIN — ACETAMINOPHEN 650 MG: 325 TABLET ORAL at 20:54

## 2017-12-02 RX ADMIN — ACETAMINOPHEN 650 MG: 325 TABLET ORAL at 06:08

## 2017-12-02 RX ADMIN — FAMOTIDINE 20 MG: 20 TABLET, FILM COATED ORAL at 10:30

## 2017-12-02 RX ADMIN — ASPIRIN 81 MG 81 MG: 81 TABLET ORAL at 10:30

## 2017-12-02 RX ADMIN — HEPARIN SODIUM 5000 UNITS: 5000 INJECTION, SOLUTION INTRAVENOUS; SUBCUTANEOUS at 20:54

## 2017-12-02 RX ADMIN — Medication 1 TABLET: at 10:32

## 2017-12-02 RX ADMIN — ACETAMINOPHEN 650 MG: 325 TABLET ORAL at 01:08

## 2017-12-02 RX ADMIN — ACETAMINOPHEN 650 MG: 325 TABLET ORAL at 10:32

## 2017-12-02 RX ADMIN — GUAIFENESIN 1200 MG: 600 TABLET, EXTENDED RELEASE ORAL at 20:54

## 2017-12-02 RX ADMIN — BUMETANIDE 2 MG: 0.25 INJECTION INTRAMUSCULAR; INTRAVENOUS at 14:32

## 2017-12-02 RX ADMIN — LOSARTAN POTASSIUM AND HYDROCHLOROTHIAZIDE 1 TABLET: 12.5; 5 TABLET ORAL at 10:31

## 2017-12-02 RX ADMIN — ATORVASTATIN CALCIUM 40 MG: 40 TABLET, FILM COATED ORAL at 20:54

## 2017-12-02 RX ADMIN — BUDESONIDE AND FORMOTEROL FUMARATE DIHYDRATE 2 PUFF: 160; 4.5 AEROSOL RESPIRATORY (INHALATION) at 20:46

## 2017-12-02 RX ADMIN — HEPARIN SODIUM 5000 UNITS: 5000 INJECTION, SOLUTION INTRAVENOUS; SUBCUTANEOUS at 10:29

## 2017-12-02 RX ADMIN — BUDESONIDE AND FORMOTEROL FUMARATE DIHYDRATE 2 PUFF: 160; 4.5 AEROSOL RESPIRATORY (INHALATION) at 09:14

## 2017-12-02 RX ADMIN — DOCUSATE SODIUM 100 MG: 100 CAPSULE, LIQUID FILLED ORAL at 10:30

## 2017-12-02 NOTE — PROGRESS NOTES
Cardiothoracic Surgery Progress Note      POD # 2 s/p Right VATS with Right Upper Lobectomy       LOS: 2 days      Subjective:  Pain controlled.  Denies shortness of breath     Objective:  Vital Signs  Temp:  [97.5 °F (36.4 °C)-98.1 °F (36.7 °C)] 97.8 °F (36.6 °C)  Heart Rate:  [82-95] 95  Resp:  [20] 20  BP: (106-125)/(58-81) 114/76    Physical Exam:   General Appearance: alert, appears stated age and cooperative   Lungs: Decreased bibasilar breath sounds no wheezes, rales or rhonchi.  No air leak.   Heart: Regular rate and rhythm with no murmurs, rubs or gallops   Skin: Incision c/d/i   CT - No air leak  Results:    Results from last 7 days  Lab Units 12/01/17  0400   WBC 10*3/mm3 13.55*   HEMOGLOBIN g/dL 11.5   HEMATOCRIT % 36.6   PLATELETS 10*3/mm3 214       Results from last 7 days  Lab Units 12/01/17  0400   SODIUM mmol/L 138   POTASSIUM mmol/L 4.6   CHLORIDE mmol/L 103   CO2 mmol/L 27.0   BUN mg/dL 24*   CREATININE mg/dL 1.20   GLUCOSE mg/dL 152*   CALCIUM mg/dL 9.2         Assessment:  POD # 2 s/p Right VATS with Right Upper Lobectomy  Good progress    Plan:  Await final pathology  Ambulate  Aggressive pulmonary toilet  Continue chest tubes, will D/C tomorrow if drainage under 200cc/24hrs

## 2017-12-03 ENCOUNTER — APPOINTMENT (OUTPATIENT)
Dept: GENERAL RADIOLOGY | Facility: HOSPITAL | Age: 77
End: 2017-12-03

## 2017-12-03 LAB
ABO + RH BLD: NORMAL
ABO + RH BLD: NORMAL
BH BB BLOOD EXPIRATION DATE: NORMAL
BH BB BLOOD EXPIRATION DATE: NORMAL
BH BB BLOOD TYPE BARCODE: 5100
BH BB BLOOD TYPE BARCODE: 5100
BH BB DISPENSE STATUS: NORMAL
BH BB DISPENSE STATUS: NORMAL
BH BB PRODUCT CODE: NORMAL
BH BB PRODUCT CODE: NORMAL
BH BB UNIT NUMBER: NORMAL
BH BB UNIT NUMBER: NORMAL
CROSSMATCH INTERPRETATION: NORMAL
CROSSMATCH INTERPRETATION: NORMAL
UNIT  ABO: NORMAL
UNIT  ABO: NORMAL
UNIT  RH: NORMAL
UNIT  RH: NORMAL

## 2017-12-03 PROCEDURE — 94799 UNLISTED PULMONARY SVC/PX: CPT

## 2017-12-03 PROCEDURE — 94760 N-INVAS EAR/PLS OXIMETRY 1: CPT

## 2017-12-03 PROCEDURE — 25010000002 HEPARIN (PORCINE) PER 1000 UNITS: Performed by: PHYSICIAN ASSISTANT

## 2017-12-03 PROCEDURE — 71010 HC CHEST PA OR AP: CPT

## 2017-12-03 PROCEDURE — 99024 POSTOP FOLLOW-UP VISIT: CPT | Performed by: THORACIC SURGERY (CARDIOTHORACIC VASCULAR SURGERY)

## 2017-12-03 PROCEDURE — 94640 AIRWAY INHALATION TREATMENT: CPT

## 2017-12-03 RX ADMIN — ACETAMINOPHEN 650 MG: 325 TABLET ORAL at 11:47

## 2017-12-03 RX ADMIN — HEPARIN SODIUM 5000 UNITS: 5000 INJECTION, SOLUTION INTRAVENOUS; SUBCUTANEOUS at 08:53

## 2017-12-03 RX ADMIN — ACETAMINOPHEN 650 MG: 325 TABLET ORAL at 21:30

## 2017-12-03 RX ADMIN — ACETAMINOPHEN 650 MG: 325 TABLET ORAL at 05:27

## 2017-12-03 RX ADMIN — DOCUSATE SODIUM 100 MG: 100 CAPSULE, LIQUID FILLED ORAL at 08:53

## 2017-12-03 RX ADMIN — SODIUM CHLORIDE 250 ML: 9 INJECTION, SOLUTION INTRAVENOUS at 16:06

## 2017-12-03 RX ADMIN — Medication 1 TABLET: at 08:53

## 2017-12-03 RX ADMIN — GUAIFENESIN 1200 MG: 600 TABLET, EXTENDED RELEASE ORAL at 08:53

## 2017-12-03 RX ADMIN — HEPARIN SODIUM 5000 UNITS: 5000 INJECTION, SOLUTION INTRAVENOUS; SUBCUTANEOUS at 21:26

## 2017-12-03 RX ADMIN — FAMOTIDINE 20 MG: 20 TABLET, FILM COATED ORAL at 08:53

## 2017-12-03 RX ADMIN — BUDESONIDE AND FORMOTEROL FUMARATE DIHYDRATE 2 PUFF: 160; 4.5 AEROSOL RESPIRATORY (INHALATION) at 21:54

## 2017-12-03 RX ADMIN — ATORVASTATIN CALCIUM 40 MG: 40 TABLET, FILM COATED ORAL at 21:26

## 2017-12-03 RX ADMIN — ASPIRIN 81 MG 81 MG: 81 TABLET ORAL at 08:53

## 2017-12-03 RX ADMIN — CETIRIZINE HYDROCHLORIDE 10 MG: 10 TABLET, FILM COATED ORAL at 08:54

## 2017-12-03 RX ADMIN — LOSARTAN POTASSIUM AND HYDROCHLOROTHIAZIDE 1 TABLET: 12.5; 5 TABLET ORAL at 08:54

## 2017-12-03 RX ADMIN — GUAIFENESIN 1200 MG: 600 TABLET, EXTENDED RELEASE ORAL at 21:26

## 2017-12-03 RX ADMIN — BUDESONIDE AND FORMOTEROL FUMARATE DIHYDRATE 2 PUFF: 160; 4.5 AEROSOL RESPIRATORY (INHALATION) at 07:46

## 2017-12-03 NOTE — PLAN OF CARE
Problem: Patient Care Overview (Adult)  Goal: Plan of Care Review  Outcome: Ongoing (interventions implemented as appropriate)    12/03/17 1413   Coping/Psychosocial Response Interventions   Plan Of Care Reviewed With patient   Patient Care Overview   Progress progress toward functional goals as expected         Problem: Fall Risk (Adult)  Goal: Identify Related Risk Factors and Signs and Symptoms  Outcome: Ongoing (interventions implemented as appropriate)    12/03/17 1413   Fall Risk   Fall Risk: Related Risk Factors fatigue/slow reaction   Fall Risk: Signs and Symptoms presence of risk factors         Problem: Pain, Acute (Adult)  Goal: Identify Related Risk Factors and Signs and Symptoms  Outcome: Ongoing (interventions implemented as appropriate)    12/03/17 1413   Pain, Acute   Related Risk Factors (Acute Pain) surgery       Goal: Acceptable Pain Control/Comfort Level  Outcome: Ongoing (interventions implemented as appropriate)    12/03/17 1413   Pain, Acute (Adult)   Acceptable Pain Control/Comfort Level making progress toward outcome

## 2017-12-03 NOTE — PLAN OF CARE
Problem: Patient Care Overview (Adult)  Goal: Plan of Care Review  Outcome: Ongoing (interventions implemented as appropriate)    12/03/17 0057   Coping/Psychosocial Response Interventions   Plan Of Care Reviewed With patient   Patient Care Overview   Progress improving       Goal: Adult Individualization and Mutuality  Outcome: Ongoing (interventions implemented as appropriate)  Goal: Discharge Needs Assessment  Outcome: Ongoing (interventions implemented as appropriate)    Problem: Perioperative Period (Adult)  Goal: Signs and Symptoms of Listed Potential Problems Will be Absent or Manageable (Perioperative Period)  Outcome: Ongoing (interventions implemented as appropriate)    12/03/17 0057   Perioperative Period   Problems Assessed (Perioperative Period) all   Problems Present (Perioperative Period) pain         Problem: Fall Risk (Adult)  Goal: Identify Related Risk Factors and Signs and Symptoms  Outcome: Ongoing (interventions implemented as appropriate)    12/03/17 0057   Fall Risk   Fall Risk: Related Risk Factors history of falls;environment unfamiliar;slipper/uneven surfaces   Fall Risk: Signs and Symptoms presence of risk factors       Goal: Absence of Falls  Outcome: Ongoing (interventions implemented as appropriate)    12/03/17 0057   Fall Risk (Adult)   Absence of Falls making progress toward outcome         Problem: Pain, Acute (Adult)  Goal: Identify Related Risk Factors and Signs and Symptoms  Outcome: Ongoing (interventions implemented as appropriate)    12/03/17 0057   Pain, Acute   Related Risk Factors (Acute Pain) procedure/treatment;knowledge deficit;fear   Signs and Symptoms (Acute Pain) fatigue/weakness       Goal: Acceptable Pain Control/Comfort Level  Outcome: Ongoing (interventions implemented as appropriate)    12/03/17 0057   Pain, Acute (Adult)   Acceptable Pain Control/Comfort Level making progress toward outcome         Problem: Chest Tube Drainage Device (Adult)  Goal: Signs and  Symptoms of Listed Potential Problems Will be Absent or Manageable (Chest Tube Drainage Device)  Outcome: Ongoing (interventions implemented as appropriate)    12/03/17 0057   Chest Tube Drainage Device   Problems Assessed (Chest Tube Drainage Device) all   Problems Present (Chest Tube Drainage Device) pain

## 2017-12-03 NOTE — PROGRESS NOTES
Cardiothoracic Surgery Progress Note      POD #3 s/p Right VATS with Right Upper Lobectomy  Chief complaint chest tube pain site     LOS: 3 days      Subjective:  Pain controlled.  Denies shortness of breath     Objective:  Vital Signs  Temp:  [97.9 °F (36.6 °C)-98.7 °F (37.1 °C)] 97.9 °F (36.6 °C)  Heart Rate:  [85-95] 95  Resp:  [18-20] 20  BP: (109-115)/(61-81) 109/81    Physical Exam:   General Appearance: alert, appears stated age and cooperative   Lungs: Decreased bibasilar breath sounds no wheezes, rales or rhonchi.  No air leak.   Heart: Regular rate and rhythm with no murmurs, rubs or gallops   Skin: Incision c/d/i   CT - No air leak  Results:    Results from last 7 days  Lab Units 12/01/17  0400   WBC 10*3/mm3 13.55*   HEMOGLOBIN g/dL 11.5   HEMATOCRIT % 36.6   PLATELETS 10*3/mm3 214       Results from last 7 days  Lab Units 12/01/17  0400   SODIUM mmol/L 138   POTASSIUM mmol/L 4.6   CHLORIDE mmol/L 103   CO2 mmol/L 27.0   BUN mg/dL 24*   CREATININE mg/dL 1.20   GLUCOSE mg/dL 152*   CALCIUM mg/dL 9.2         Assessment:  POD # 3 s/p Right VATS with Right Upper Lobectomy  Good progress  S2 branch 130 mL for 24 hours  Plan:  Await final pathology  Ambulate  Aggressive pulmonary toilet  Discontinue chest tubes    DAVID Palacios 12/3/2017 O2      Chest tube with no air leak and minimal drainage.  Chest x-ray is clear.  Patient feels well with no new complaints.  We will remove chest tube.  Possibly home in 1-2 days.I have reviewed, verified, and confirmed the above history and current status.  I have examined the patient and confirmed the above physical findings.    Balaji Dimas MD  CTSurgery  12/03/17   12:26 PM I have reviewed, verified, and confirmed the above history and current status.  I have examined the patient and confirmed the above physical findings.    Balaji Dimas MD  CTSurgery  12/03/17   12:26 PM

## 2017-12-04 ENCOUNTER — APPOINTMENT (OUTPATIENT)
Dept: GENERAL RADIOLOGY | Facility: HOSPITAL | Age: 77
End: 2017-12-04

## 2017-12-04 VITALS
HEART RATE: 94 BPM | DIASTOLIC BLOOD PRESSURE: 62 MMHG | BODY MASS INDEX: 35.65 KG/M2 | OXYGEN SATURATION: 95 % | SYSTOLIC BLOOD PRESSURE: 104 MMHG | HEIGHT: 65 IN | RESPIRATION RATE: 16 BRPM | TEMPERATURE: 97.7 F | WEIGHT: 214 LBS

## 2017-12-04 PROCEDURE — 94799 UNLISTED PULMONARY SVC/PX: CPT

## 2017-12-04 PROCEDURE — 94640 AIRWAY INHALATION TREATMENT: CPT

## 2017-12-04 PROCEDURE — 25010000002 HEPARIN (PORCINE) PER 1000 UNITS: Performed by: PHYSICIAN ASSISTANT

## 2017-12-04 PROCEDURE — 94760 N-INVAS EAR/PLS OXIMETRY 1: CPT

## 2017-12-04 PROCEDURE — 71010 HC CHEST PA OR AP: CPT

## 2017-12-04 RX ORDER — HYDROCODONE BITARTRATE AND ACETAMINOPHEN 7.5; 325 MG/1; MG/1
1 TABLET ORAL EVERY 6 HOURS PRN
Start: 2017-12-04 | End: 2017-12-10

## 2017-12-04 RX ADMIN — BUDESONIDE AND FORMOTEROL FUMARATE DIHYDRATE 2 PUFF: 160; 4.5 AEROSOL RESPIRATORY (INHALATION) at 09:14

## 2017-12-04 RX ADMIN — Medication 1 TABLET: at 09:07

## 2017-12-04 RX ADMIN — ACETAMINOPHEN 650 MG: 325 TABLET ORAL at 06:31

## 2017-12-04 RX ADMIN — HEPARIN SODIUM 5000 UNITS: 5000 INJECTION, SOLUTION INTRAVENOUS; SUBCUTANEOUS at 09:07

## 2017-12-04 RX ADMIN — ASPIRIN 81 MG 81 MG: 81 TABLET ORAL at 09:06

## 2017-12-04 RX ADMIN — FAMOTIDINE 20 MG: 20 TABLET, FILM COATED ORAL at 09:07

## 2017-12-04 RX ADMIN — GUAIFENESIN 1200 MG: 600 TABLET, EXTENDED RELEASE ORAL at 09:07

## 2017-12-04 RX ADMIN — CETIRIZINE HYDROCHLORIDE 10 MG: 10 TABLET, FILM COATED ORAL at 09:06

## 2017-12-04 RX ADMIN — ACETAMINOPHEN 650 MG: 325 TABLET ORAL at 13:10

## 2017-12-04 RX ADMIN — DOCUSATE SODIUM 100 MG: 100 CAPSULE, LIQUID FILLED ORAL at 09:07

## 2017-12-04 NOTE — PLAN OF CARE
Problem: Patient Care Overview (Adult)  Goal: Plan of Care Review  Outcome: Ongoing (interventions implemented as appropriate)    12/03/17 1413 12/03/17 2030   Coping/Psychosocial Response Interventions   Plan Of Care Reviewed With --  patient   Patient Care Overview   Progress progress toward functional goals as expected --          Problem: Fall Risk (Adult)  Goal: Identify Related Risk Factors and Signs and Symptoms  Outcome: Ongoing (interventions implemented as appropriate)    12/04/17 0319   Fall Risk   Fall Risk: Related Risk Factors history of falls;environment unfamiliar;fatigue/slow reaction   Fall Risk: Signs and Symptoms presence of risk factors         Problem: Pain, Acute (Adult)  Goal: Identify Related Risk Factors and Signs and Symptoms  Outcome: Ongoing (interventions implemented as appropriate)    12/03/17 0057 12/03/17 1413   Pain, Acute   Related Risk Factors (Acute Pain) --  surgery   Signs and Symptoms (Acute Pain) fatigue/weakness --

## 2017-12-04 NOTE — DISCHARGE SUMMARY
CTS Discharge Summary    Patient Care Team:  Hai Solomon MD as PCP - General  Hai Solomon MD as PCP - Family Medicine  Hai Solomon MD      Date of Admission: 11/30/2017  9:42 AM  Date of Discharge: 12/4/2017    Discharge Diagnosis  Past Medical History:   Diagnosis Date   • Arthritis    • GERD (gastroesophageal reflux disease)    • Hypercholesteremia    • Hypertension    • Lung cancer 2015    LEFT LUNG LOBECTOMY    • Lung cancer 2017    RIGHT    • On home O2     2.5 L HS,    • Pneumothorax 2015    AFTER LUNG BIOPSY    • Wears dentures    • Wears glasses        Principal Problem:    Lung nodule  Active Problems:    HTN (hypertension)    Hypercholesteremia    GERD (gastroesophageal reflux disease)    S/P lobectomy of lung      History of Present Illness  Patient is a 77-year-old  female with history of hypertension, hypercholesterolemia and a 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  The patient was referred to our group from Dr. Luisana Washburn with Hem/Onc for a new right upper lobe lung nodule with left adrenal nodule, both of which were hypermetabolic active on PET scan performed 11/6/17.  She has been referred for possible right upper lobectomy.  Currently, the patient complained of some fatigue with shortness of breath.  She denies any lymphadenopathy, weight loss or hemoptysis.  I believe a right VATS with right upper lobe wedge resection and possible lobectomy is most prudent course of action.  I explained the risks of the procedure including pain, bleeding, infection, air leak, heart attack and death.  The patient understood these risks and wishes to proceed with the operation.      Hospital Course  Patient is a 77 y.o. 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  Ms. Nix  was referred to our group from Dr. Lieberman  Maddison with Hem/Onc, after the discovery of a new hypermetabolic  active right upper lobe lung nodule with left adrenal nodule on 11/6/17 PET scan.  Patient was admitted to [facility] on 11/30/2017 and underwent a right VATS with right upper lobectomy and mediastinal lymph node dissection, which the patient tolerated well.  On postop day #1 and #2 the patient is doing well with minimal incisional pain, but denies any dyspnea.  Postoperative day #3 the patient's chest tubes were removed with no evidence of air leak and minimal drainage.  On postoperative day #4 the patient was discharged home.  She will need to follow with Dr. Guzmán for evaluation of her left adrenal mass.  Pathology still pending.    Procedures Performed  Procedure(s):  BRONCHOSCOPY  THORACOSCOPY VIDEO ASSISTED WITH RIGHT UPPER LOBE WEDGE RESECTION AND COMPLETION OF RIGHT UPPER LOBECTOMY, MEDIASTINAL LYMPH NODE DISSECTION AND INTERCOSTAL NERVE BLOCKS       Consults:   Consults     No orders found from 11/1/2017 to 12/1/2017.            Discharge Medications   America Nix   Home Medication Instructions ANI:[id number]    Printed on:12/04/17 4479   Medication Information                      Ascorbic Acid (VITAMIN C PO)  Take 1,000 mg by mouth Daily.             aspirin 81 MG tablet  Take 81 mg by mouth Daily.             Cholecalciferol (VITAMIN D3 PO)  Take 5,000 Units by mouth Daily.             CRANBERRY PO  Take 1,000 mg by mouth Daily. 2, 500MG CAPSULES DAILY             docusate sodium (COLACE) 100 MG capsule  Take 100 mg by mouth Daily.             ezetimibe (ZETIA) 10 MG tablet  Take 10 mg by mouth Every Night.             guaiFENesin (MUCINEX) 600 MG 12 hr tablet  Take 1,200 mg by mouth 2 (Two) Times a Day.             HYDROcodone-acetaminophen (NORCO) 7.5-325 MG per tablet  Take 1 tablet by mouth Every 6 (Six) Hours As Needed for Moderate Pain  for up to 6 days.             loratadine (CLARITIN) 10 MG tablet  Take 10 mg  by mouth Daily.             mometasone-formoterol (DULERA 100) 100-5 MCG/ACT inhaler  Inhale 2 puffs Daily As Needed (SHORTNESS OF AIR).             Multiple Vitamin tablet  Take 1 tablet by mouth Daily.             olmesartan-hydrochlorothiazide (BENICAR HCT) 40-12.5 MG per tablet  Take 1 tablet by mouth Daily.             simethicone (MYLICON) 125 MG chewable tablet  Chew 125 mg Every 6 (Six) Hours As Needed for Flatulence.             simvastatin (ZOCOR) 80 MG tablet  Take 80 mg by mouth Every Night.             Triamcinolone Acetonide (NASACORT AQ NA)  2 sprays into each nostril Daily.                 Discharge Diet:   Low Sodium, Heart Healthy diet with low fats.     Activity at Discharge:   Do not drive until off all pain medication  Do not lift anything greater than 10 pounds  Try to refrain from pulling or pushing with arms  Please refrain from lifting >10lbs above her head    Follow-up Appointments  Future Appointments  Date Time Provider Department Center   12/13/2017 1:30 PM Luisana Washburn MD [facility]          BLANCA Diaz  12/04/17  2:13 PM

## 2017-12-04 NOTE — OP NOTE
DATE OF PROCEDURE: 11/30/2017     PREOPERATIVE DIAGNOSES:  1. Stage IB Adenocarcinoma of the left upper lobe status post resection  2. Right upper lobe lung nodule  3. Left adrenal mass  4. Hypertension  5. Remote tobacco abuse     POSTOPERATIVE DIAGNOSES:    1. Stage IB Adenocarcinoma of the left upper lobe status post resection  2. Right upper lobe lung nodule  3. Left adrenal mass  4. Hypertension  5. Remote tobacco abuse     PROCEDURES PERFORMED:    1. Bronchoscopy  2. Right video assisted thoracoscopic surgery  3. Right upper lobectomy  4. Mediastinal lymph node dissection  5. Intercostal nerve blocks    SURGEON: Jeremy Herrera MD       ASSISTANTS:    1. Que Lucas PA-C      ANESTHESIA: General endotracheal anesthesia with Daniel Lanius and Kimberly Christine CRNA     ESTIMATED BLOOD LOSS: 200 mL.       INDICATIONS:  77 year old  female with a history of Stage 1B poorly differentiated adenocarcinoma of the lung status post left upper lobectomy, now with a new right upper lobe lung nodule and adrenal mass concerning for malignancy.  With no evidence of lymphadenopathy, contralateral location and the interval of presentation, I would assume this is a new primary malignancy separate from her original lung cancer.  Her pulmonary function studies were acceptable for resection.  With no significant lymphadenopathy of PET, I felt that wedge resection for diagnosis with possible lobectomy was a reasonable option.  The risks and benefits of surgery were discussed with the patient including pain, bleeding, infection, renal failure, stroke and death. The patient understood these risks and wished to proceed with surgery.      DESCRIPTION OF PROCEDURE:  The patient was taken to the operating room and placed under general endotracheal anesthesia.  A double-lumen endotracheal tube was placed and the position verified with a pediatric bronchoscope.  Examination of the bilateral bronchial tree down to the level of  the subsegmental bronchi did not reveal any evidence of endobronchial mass, blood or secretions.  The patient was then placed in the left lateral decubitus position and all four extremities were padded and secured to prevent neurologic injury.  She was prepped and draped in the usual sterile fashion.  A time out was performed including the patient's name, procedure and antibiotic administration.  An incision was made at the seventh intercostal space and a 10 mm 30 degree scope was inserted.  An additional posterior working port was made at the seventh intercostal space and an anterior working port at the level of the superior pulmonary vein.  The known right upper lobe mass was visualized posteriorly just above the fissure.  A wedge resection was performed using a 60 green stapler.  The specimen was externalized using an endocatch bag.  This was sent for frozen specimen.  The inferior pulmonary ligament was divided up to the inferior pulmonary vein.  There were no station 9 lymph nodes.  The forzen specimen was consistent with malignancy.  I elected to proceed with right upper lobectomy.  The superior pulmonary vein was encircled and the branch to the right upper lobe divided with a vascular stapler.  The arterial branches to the right upper lobe were encircled and divided using a vascular stapler as they came off the pulmonary artery.  The bronchus to the right upper lobe was encircled and test clamp applied with a 60 green staple load.  Test insufflation revealed satisfactory inflation of the right middle and lower lobes.  The bronchus was divided and the fissure completed with additional 60 green staple loads.  The upper lobe was then externalized using a 15 mm endocatch bag.  This was sent for margins, which were clear.  Lymph nodes were then harvested from stations 2, 4, 7, 10 and 11.  Intercostal nerve blocks were then performed within the chest under direct vision of all involved interspaces.  The chest was  then irrigated with warm water and test insufflation did not reveal an air leak.  The right middle and lower lobes satisfactorily inflated.  Two 28 Fr channel drains were placed anterior and posterior to the hilum and secured using zero silk sutures.  Zero Ethibonds were also placed in a mattress fashion for later thoracostomy site closure.  The anterior working port was then closed with a running 2-0 Vicryl suture followed by 3-0 Vicryl dermal layer and 4-0 Monocryl subcuticular suture.  Skin glue was applied to this incision and gauze and tape the chest tube sites.  The patient was returned to the supine position and extubated prior to transport the the recovery room in stable condition.

## 2017-12-04 NOTE — PROGRESS NOTES
Cardiothoracic Surgery Progress Note      POD # 4 s/p Right VATS with Right Upper Lobectomy       LOS: 4 days      Subjective:  Pain controlled.  Denies shortness of breath.  Wants to go home     Objective:  Vital Signs  Temp:  [97.6 °F (36.4 °C)-98.3 °F (36.8 °C)] 97.7 °F (36.5 °C)  Heart Rate:  [80-95] 83  Resp:  [16-20] 20  BP: ()/(54-81) 116/65    Physical Exam:   General Appearance: alert, appears stated age and cooperative   Lungs: Clear to ausculation   Heart: Regular rate and rhythm with no murmurs, rubs or gallops   Skin: Incision c/d/i   CT - Out  Results:    Results from last 7 days  Lab Units 12/01/17  0400   WBC 10*3/mm3 13.55*   HEMOGLOBIN g/dL 11.5   HEMATOCRIT % 36.6   PLATELETS 10*3/mm3 214       Results from last 7 days  Lab Units 12/01/17  0400   SODIUM mmol/L 138   POTASSIUM mmol/L 4.6   CHLORIDE mmol/L 103   CO2 mmol/L 27.0   BUN mg/dL 24*   CREATININE mg/dL 1.20   GLUCOSE mg/dL 152*   CALCIUM mg/dL 9.2         Assessment:  POD # 4 s/p Right VATS with Right Upper Lobectomy  Good progress    Plan:  Await final pathology  D/C home  Follow-up Dr. Guzmán for evaluation of this left adrenal mass  Ambulate  Aggressive pulmonary toilet

## 2017-12-04 NOTE — PROGRESS NOTES
Discharge Planning Assessment  [facility]     Patient Name: America Nix  MRN: [id number]  Today's Date: 12/4/2017    Admit Date: 11/30/2017          Discharge Needs Assessment       12/04/17 0944    Living Environment    Lives With spouse    Living Arrangements mobile home    Home Accessibility ramps present at home    Type of Financial/Environmental Concern none    Transportation Available car;family or friend will provide    Living Environment    Provides Primary Care For no one    Quality Of Family Relationships unable to assess    Able to Return to Prior Living Arrangements yes    Discharge Needs Assessment    Concerns To Be Addressed no discharge needs identified;denies needs/concerns at this time    Readmission Within The Last 30 Days no previous admission in last 30 days    Anticipated Changes Related to Illness none    Equipment Currently Used at Home oxygen;respiratory supplies    Equipment Needed After Discharge none    Discharge Disposition home or self-care    Discharge Contact Information if Applicable Hardy Nix, spouse  [phone]  [phone]            Discharge Plan       12/04/17 0945    Case Management/Social Work Plan    Plan Home    Patient/Family In Agreement With Plan yes    Additional Comments Spoke with patient at bedside regarding discharge planning.  Patient denies use of Home Health and has a Raised Toilet, Shower Chair and Oxygen HS that has been provided through [facility] out [facility].  Patient has prescription coverage.  Patient lives with her  in a mobile home with ramp access.  Patient denies concerns regarding home safety after discharge.  Patient plans to discharge home today via car with family to transport.         Discharge Placement     No information found        Expected Discharge Date and Time     Expected Discharge Date Expected Discharge Time    Dec 4, 2017               Demographic Summary       12/04/17 0943    Referral Information    Admission  Type inpatient    Arrived From home or self-care    Referral Source admission list    Reason For Consult discharge planning    Record Reviewed clinical discipline documentation;history and physical;patient profile    Primary Care Physician Information    Name Hai Solomon MD            Functional Status       12/04/17 9072    Functional Status Current    Current Functional Level Comment Per Nursing Assessment    Functional Status Prior    Ambulation 0-->independent    Transferring 0-->independent    Toileting 0-->independent    Bathing 0-->independent    Dressing 0-->independent    Eating 0-->independent    Communication 0-->understands/communicates without difficulty    Swallowing 0-->swallows foods/liquids without difficulty    IADL    Medications independent    Meal Preparation independent    Housekeeping independent    Laundry independent    Shopping independent    Oral Care independent    Activity Tolerance    Current Activity Limitations none    Usual Activity Tolerance good    Current Activity Tolerance good    Employment/Financial    Employment/Finance Comments [employer] Re[employer]            Psychosocial     None            Abuse/Neglect     None            Legal     None            Substance Abuse     None            Patient Forms     None          Anjana Oconnell, RN

## 2017-12-06 LAB
CYTO UR: NORMAL
DX PRELIMINARY: NORMAL
LAB AP CASE REPORT: NORMAL
LAB AP CLINICAL INFORMATION: NORMAL
LAB AP DIAGNOSIS COMMENT: NORMAL
Lab: NORMAL
Lab: NORMAL
PATH REPORT.FINAL DX SPEC: NORMAL
PATH REPORT.GROSS SPEC: NORMAL

## 2017-12-13 ENCOUNTER — OFFICE VISIT (OUTPATIENT)
Dept: ONCOLOGY | Facility: CLINIC | Age: 77
End: 2017-12-13

## 2017-12-13 VITALS
SYSTOLIC BLOOD PRESSURE: 141 MMHG | WEIGHT: 210 LBS | BODY MASS INDEX: 34.95 KG/M2 | RESPIRATION RATE: 22 BRPM | HEART RATE: 88 BPM | TEMPERATURE: 97.3 F | DIASTOLIC BLOOD PRESSURE: 74 MMHG

## 2017-12-13 DIAGNOSIS — C34.12 CANCER OF UPPER LOBE OF LEFT LUNG (HCC): Primary | ICD-10-CM

## 2017-12-13 PROCEDURE — 99214 OFFICE O/P EST MOD 30 MIN: CPT | Performed by: INTERNAL MEDICINE

## 2017-12-13 NOTE — PROGRESS NOTES
DATE OF VISIT: 12/13/2017    REASON FOR VISIT: Followup for:  A. Stage IB, [id number], poorly differentiated adenocarcinoma of the lung.  B. Stage IB [id number]  right upper lobe poorly differentiated adenocarcinoma  C. Left adrenal hypermetabolic nodule    HISTORY OF PRESENT ILLNESS: The patient is a very pleasant 76-year-old female with past medical history significant for poorly differentiated adenocarcinoma of the lung, [id number], tumor size 3.2 cm in the left upper lobe. The patient is status post left upper lobe resection with mediastinal lymph node sampling done by Dr. Jeremy Herrera on 08/26/2015. The patient is here today in scheduled  followup visit.    SUBJECTIVE: The patient was able to tolerate her right thoracotomy fairly well.  She is complaining of mild pain surgical site.  She denied any headaches,  denied any night sweats. He breathing is back to normal. Her appetite is good.  She denied any abdominal pain.     REVIEW OF SYSTEMS: All the other systems are reviewed by me and negative  except what is mentioned in HPI and subjectives.     PAST MEDICAL HISTORY/SOCIAL HISTORY/FAMILY HISTORY: Unchanged from my prior  documentation done on 08/13/2015.      Current Outpatient Prescriptions:   •  Ascorbic Acid (VITAMIN C PO), Take 1,000 mg by mouth Daily., Disp: , Rfl:   •  aspirin 81 MG tablet, Take 81 mg by mouth Daily., Disp: , Rfl:   •  Cholecalciferol (VITAMIN D3 PO), Take 5,000 Units by mouth Daily., Disp: , Rfl:   •  CRANBERRY PO, Take 1,000 mg by mouth Daily. 2, 500MG CAPSULES DAILY, Disp: , Rfl:   •  docusate sodium (COLACE) 100 MG capsule, Take 100 mg by mouth Daily., Disp: , Rfl:   •  ezetimibe (ZETIA) 10 MG tablet, Take 10 mg by mouth Every Night., Disp: , Rfl:   •  guaiFENesin (MUCINEX) 600 MG 12 hr tablet, Take 1,200 mg by mouth 2 (Two) Times a Day., Disp: , Rfl:   •  loratadine (CLARITIN) 10 MG tablet, Take 10 mg by mouth Daily., Disp: , Rfl:   •  mometasone-formoterol (DULERA 100) 100-5 MCG/ACT  inhaler, Inhale 2 puffs Daily As Needed (SHORTNESS OF AIR)., Disp: , Rfl:   •  Multiple Vitamin tablet, Take 1 tablet by mouth Daily., Disp: , Rfl:   •  olmesartan-hydrochlorothiazide (BENICAR HCT) 40-12.5 MG per tablet, Take 1 tablet by mouth Daily., Disp: , Rfl:   •  simethicone (MYLICON) 125 MG chewable tablet, Chew 125 mg Every 6 (Six) Hours As Needed for Flatulence., Disp: , Rfl:   •  simvastatin (ZOCOR) 80 MG tablet, Take 80 mg by mouth Every Night., Disp: , Rfl:   •  Triamcinolone Acetonide (NASACORT AQ NA), 2 sprays into each nostril Daily., Disp: , Rfl:   No current facility-administered medications for this visit.     Facility-Administered Medications Ordered in Other Visits:   •  Chlorhexidine Gluconate Cloth 2 % pads 1 application, 1 application, Topical, Q12H PRN, BLANCA Rios    PHYSICAL EXAMINATION:   There were no vitals taken for this visit.  ECOG1  GENERAL: Age appropriate. No acute distress.   HEENT: Head atraumatic, normocephalic.   NECK: Supple. No JVD. No lymphadenopathy.   LUNGS: Clear to auscultation bilaterally. No wheezing. No rhonchi.   HEART: Regular rate and rhythm. S1, S2, no murmurs.   ABDOMEN: Soft, nontender, nondistended. Bowel sounds positive. No  hepatosplenomegaly.   EXTREMITIES: No clubbing, cyanosis, +1 bilateral pedal edema.   SKIN: No rashes. No purpura.   NEUROLOGIC: Awake and oriented x3. Strength 5 out of 5 in all muscle groups.     Admission on 11/30/2017, Discharged on 12/04/2017   Component Date Value Ref Range Status   • Product Code 12/03/2017 X4450H09   Final   • Unit Number 12/03/2017 [id number]   Final   • UNIT  ABO 12/03/2017 O   Final   • UNIT  RH 12/03/2017 POS   Final   • CROSSMATCH 1 INTERPRETATION 12/03/2017 Compatible   Final   • Dispense Status 12/03/2017 RE   Final   • Blood Type 12/03/2017 OPOS   Final   • Blood Expiration Date 12/03/2017 [id number]   Final   • Blood Type Barcode 12/03/2017 5100   Final   • Product Code 12/03/2017 [id number]    Final   • Unit Number 12/03/2017 [id number]   Final   • UNIT  ABO 12/03/2017 O   Final   • UNIT  RH 12/03/2017 POS   Final   • CROSSMATCH 1 INTERPRETATION 12/03/2017 Compatible   Final   • Dispense Status 12/03/2017 RE   Final   • Blood Type 12/03/2017 OPOS   Final   • Blood Expiration Date 12/03/2017 [id number]   Final   • Blood Type Barcode 12/03/2017 5100   Final   • Case Report 11/30/2017    Final                    Value:Surgical Pathology Report                         Case: [id number]                                  Authorizing Provider:  Jeremy Herrera MD          Collected:           11/30/2017 02:18 PM          Ordering Location:     [facility]   Received:            11/30/2017 02:27 PM                                 OR                                                                           Pathologist:           Jeremy El MD                                                            Intraop:               Jeremy El MD                                                            Specimens:   1) - Lung, Right Upper Lobe, wedge for frozen                                                       2) - Lymph Node, R10 MEDIASTINAL LYMPH NODE                                                         3) - Lymph Node, R11 MEDIASTINAL LYMPH NODE                                                         4) - Lung, Right Upper Lobe, COMPLETION OF RIGHT UPPER LOBE                                                                   5) - Lymph Node, 2R MEDIASTINAL LYMPH NODE                                                          6) - Lymph Node, 4R MEDIASTINAL LYMPH NODE                                                          7) - Lymph Node, 7R MEDIASTINAL LYMPH NODE                                                • Clinical Information 11/30/2017    Final                    Value:This result contains rich text formatting which cannot be displayed here.   • Final Diagnosis 11/30/2017    Final                     Value:This result contains rich text formatting which cannot be displayed here.   • Preliminary Diagnosis 11/30/2017    Final                    Value:This result contains rich text formatting which cannot be displayed here.   • Comment 11/30/2017    Final                    Value:This result contains rich text formatting which cannot be displayed here.   • Intraoperative Consultation 11/30/2017    Final                    Value:This result contains rich text formatting which cannot be displayed here.   • Gross Description 11/30/2017    Final                    Value:This result contains rich text formatting which cannot be displayed here.   • Microscopic Description 11/30/2017    Final                    Value:This result contains rich text formatting which cannot be displayed here.   • Color, UA 11/30/2017 Yellow  Yellow, Straw Final   • Appearance, UA 11/30/2017 Clear  Clear Final   • pH, UA 11/30/2017 5.5  5.0 - 8.0 Final   • Specific Gravity, UA 11/30/2017 1.018  1.001 - 1.030 Final   • Glucose, UA 11/30/2017 Negative  Negative Final   • Ketones, UA 11/30/2017 15 mg/dL (1+)* Negative Final   • Bilirubin, UA 11/30/2017 Negative  Negative Final   • Blood, UA 11/30/2017 Large (3+)* Negative Final   • Protein, UA 11/30/2017 Negative  Negative Final   • Leuk Esterase, UA 11/30/2017 Negative  Negative Final   • Nitrite, UA 11/30/2017 Negative  Negative Final   • Urobilinogen, UA 11/30/2017 0.2 E.U./dL  0.2 - 1.0 E.U./dL Final   • Urine Culture 11/30/2017 No growth at 2 days   Final   • RBC, UA 11/30/2017 31-50* None Seen, 0-2 /HPF Final   • WBC, UA 11/30/2017 0-2* None Seen /HPF Final   • Bacteria, UA 11/30/2017 None Seen  None Seen, Trace /HPF Final   • Squamous Epithelial Cells, UA 11/30/2017 None Seen  None Seen, 0-2 /HPF Final   • Hyaline Casts, UA 11/30/2017 None Seen  0 - 6 /LPF Final   • Methodology 11/30/2017 Automated Microscopy   Final   • Glucose 12/01/2017 152* 70 - 100 mg/dL Final   •  BUN 12/01/2017 24* 9 - 23 mg/dL Final   • Creatinine 12/01/2017 1.20  0.60 - 1.30 mg/dL Final   • Sodium 12/01/2017 138  132 - 146 mmol/L Final   • Potassium 12/01/2017 4.6  3.5 - 5.5 mmol/L Final   • Chloride 12/01/2017 103  99 - 109 mmol/L Final   • CO2 12/01/2017 27.0  20.0 - 31.0 mmol/L Final   • Calcium 12/01/2017 9.2  8.7 - 10.4 mg/dL Final   • eGFR Non African Amer 12/01/2017 44* >60 mL/min/1.73 Final   • BUN/Creatinine Ratio 12/01/2017 20.0  7.0 - 25.0 Final   • Anion Gap 12/01/2017 8.0  3.0 - 11.0 mmol/L Final   • WBC 12/01/2017 13.55* 3.50 - 10.80 10*3/mm3 Final   • RBC 12/01/2017 3.88* 3.89 - 5.14 10*6/mm3 Final   • Hemoglobin 12/01/2017 11.5  11.5 - 15.5 g/dL Final   • Hematocrit 12/01/2017 36.6  34.5 - 44.0 % Final   • MCV 12/01/2017 94.3  80.0 - 99.0 fL Final   • MCH 12/01/2017 29.6  27.0 - 31.0 pg Final   • MCHC 12/01/2017 31.4* 32.0 - 36.0 g/dL Final   • RDW 12/01/2017 13.9  11.3 - 14.5 % Final   • RDW-SD 12/01/2017 47.9  37.0 - 54.0 fl Final   • MPV 12/01/2017 10.4  6.0 - 12.0 fL Final   • Platelets 12/01/2017 214  150 - 450 10*3/mm3 Final   • Neutrophil % 12/01/2017 84.7* 41.0 - 71.0 % Final   • Lymphocyte % 12/01/2017 4.4* 24.0 - 44.0 % Final   • Monocyte % 12/01/2017 10.6  0.0 - 12.0 % Final   • Eosinophil % 12/01/2017 0.0  0.0 - 3.0 % Final   • Basophil % 12/01/2017 0.0  0.0 - 1.0 % Final   • Immature Grans % 12/01/2017 0.3  0.0 - 0.6 % Final   • Neutrophils, Absolute 12/01/2017 11.49* 1.50 - 8.30 10*3/mm3 Final   • Lymphocytes, Absolute 12/01/2017 0.59* 0.60 - 4.80 10*3/mm3 Final   • Monocytes, Absolute 12/01/2017 1.43* 0.00 - 1.00 10*3/mm3 Final   • Eosinophils, Absolute 12/01/2017 0.00  0.00 - 0.30 10*3/mm3 Final   • Basophils, Absolute 12/01/2017 0.00  0.00 - 0.20 10*3/mm3 Final   • Immature Grans, Absolute 12/01/2017 0.04* 0.00 - 0.03 10*3/mm3 Final      Mri Brain With & Without Contrast    Result Date: 11/14/2017  Narrative: EXAMINATION: MRI BRAIN WITH AND WITHOUT CONTRAST-11/14/2017:   INDICATION: Complete cancer staging; C34.12-Malignant neoplasm of upper lobe, left bronchus or lung, followup and restaging malignancy. Recently positive PET CT data set for recurrent hypermetabolic neoplasm.  TECHNIQUE:  MR data sets of the brain were performed without and with intravenous contrast.  COMPARISON:  Compared to distant examinations of 08/19/2015.  FINDINGS: 1. Contrast enhanced data sets of the brain are negative for focal enhancing lesion. Evidence of active intra-axial cerebral metastasis is not currently identified.  2. The diffusion weighted sequence is negative. There is no restricted diffusion or acute ischemic insult. The ADC mapping exam is unremarkable.  3. T1 data sets are negative for intra-axial or extracerebral hemorrhage.  The FLAIR data sets demonstrate white matter microangiopathy in the periventricular area with increased focal FLAIR and T2 signal and minimal increased pontine signal. This is indicative of small vessel microangiopathy which is appropriate for age and which is essentially stable from previous examinations.  4. There is empty sella. The infundibular stalk remains midline. The other midline structures are within normal limits. Considerable convexity atrophy is identified with no significant central atrophy.      Impression: 1.  White matter microangiopathy bilaterally. 2.  Considerable convexity atrophy without central atrophy. 3.  Empty sella. 4.  No evidence of acute focal enhancing lesion within the brain. Active metastatic disease is not currently identified. 5.  There is no evidence of restricted diffusion or other acute focal abnormality and there is no edema, midline shift or extracerebral collection.   D:  11/14/2017 E:  11/14/2017  This report was finalized on 11/14/2017 4:25 PM by Dr. Hardy Saldaña MD.      Xr Chest 1 View    Result Date: 12/4/2017  Narrative: EXAMINATION: XR CHEST 1 VW-  INDICATION: Status post right upper lobectomy; R91.1-Solitary pulmonary  nodule.  COMPARISON: 12/03/2017.  FINDINGS: Portable chest reveals cardiac and mediastinal silhouettes within normal limits. The lung fields are grossly clear. No focal parenchymal opacification is present.  No pleural effusion or pneumothorax. Degenerative change is seen within the spine. Vascular calcifications are identified.         Impression: Stable chest with low lung volumes bilaterally. No new focal parenchymal opacification is present.  D:  12/04/2017 E:  12/04/2017  This report was finalized on 12/4/2017 10:20 AM by Dr. Sabi Zabala MD.      Xr Chest 1 View    Result Date: 12/4/2017  Narrative:  EXAMINATION: XR CHEST 1 VW - 12/03/2017  INDICATION: R91.1-Solitary pulmonary nodule.  COMPARISON: 12/03/2017.  FINDINGS: Portable chest reveals low lung volumes. There is no pneumothorax status post removal of the chest tubes. Lung volumes are low. The lung fields are grossly clear.         Impression: 2 chest tubes identified on the right have been removed with no definite pneumothorax. Lung volumes are low.  DICTATED:     12/03/2017 EDITED:          12/03/2017    This report was finalized on 12/4/2017 10:19 AM by Dr. Sabi Zabala MD.      Xr Chest 1 View    Result Date: 12/4/2017  Narrative:  EXAMINATION: XR CHEST 1 VW - 12/03/2017  INDICATION: Status post right upper lobectomy; R91.1-Solitary pulmonary nodule.  COMPARISON: 12/02/1217.  FINDINGS: Portable chest reveals the heart to be enlarged. Mild elevation of the left hemidiaphragm. Two chest tubes are identified on the right. Degenerative changes seen within the spine. Calcification in the thoracic aorta. No definite pneumothorax. Blunting of the left costophrenic angle with elevation of the left hemidiaphragm.      Impression: Stable chest as above with 2 chest tubes on the right and no definite pneumothorax.  DICTATED:     12/03/2017 EDITED:          12/03/2017    This report was finalized on 12/4/2017 9:56 AM by Dr. Sabi Zabala MD.       Xr Chest 1 View    Result Date: 12/4/2017  Narrative:  EXAMINATION: XR CHEST 1 VW - 12/02/2017  INDICATION: R91.1-Solitary pulmonary nodule.  COMPARISON: 12/01/2017.  FINDINGS: Portable chest reveals chest tubes identified on the right with low lung volumes bilaterally. The heart is enlarged. Prominence identified of the right hilar region. No significant change in the interval. No definite pneumothorax.         Impression: Stable chest as above with chest tubes on the right and no definite pneumothorax. Lung volumes low bilaterally.  DICTATED:     12/02/2017 EDITED:          12/02/2017   This report was finalized on 12/4/2017 9:50 AM by Dr. Sabi Zabala MD.      Xr Chest 1 View    Result Date: 12/1/2017  Narrative: EXAMINATION: XR CHEST 1 VW-12/01/2017:  INDICATION: Postop; R91.1-Solitary pulmonary nodule.  COMPARISON: Chest x-ray 11/30/2017.  FINDINGS: Support hardware unchanged. Decreased lung volumes bilaterally with hypoventilatory changes including vascular crowding and bibasilar atelectasis, however, no focal consolidative opacification. No pneumothorax or large pleural effusion.         Impression: Decreased lung volumes from prior with hypoventilatory changes, however, no focal consolidation or new parenchymal disease otherwise identified.  D:  12/01/2017 E:  12/01/2017  This report was finalized on 12/1/2017 10:10 AM by Dr. Noble Harrell.      Xr Chest 1 View    Result Date: 12/1/2017  Narrative:  EXAMINATION: XR CHEST 1 VW - 11/30/2017  INDICATION:  R91.1-Solitary pulmonary nodule.  COMPARISON: 11/29/2017.  FINDINGS: There are postoperative changes related to right thoracotomy. There is mild prominence the right hilum. There is no acute inflammatory process. 2 right chest tubes are in position. There is no pneumothorax or pleural effusion.         Impression: Status post right thoracotomy. Right chest tubes are in position. There is no pneumothorax.  DICTATED:     11/30/2017 EDITED:           11/30/2017    This report was finalized on 12/1/2017 8:50 AM by Dr. Que Hirsch MD.      Chest X-ray Pa & Lateral    Result Date: 11/29/2017  Narrative: EXAMINATION: XR CHEST PA AND LATERAL- 11/29/2017  INDICATION: Pre-Op Cardiac Surgery; R91.1-Solitary pulmonary nodule  COMPARISON: NONE  FINDINGS: 1. There is elevation left hemidiaphragm with volume loss left base. 2. There is apical scarring diffusely. 3. There is mild interstitial stranding with granulomatous insult which is mildly calcified. 4. Superimposed upon these chronic findings, there is no evidence of cardiomegaly, edema, free fluid or active disease.         Impression: 1. Volume reduction left chest with elevation left hemidiaphragm. 2. Heart within normal limits and compensated. 3. Diffuse apical and mild interstitial scarring. Findings are essentially stable from 08/30/2015. 4. There is no active disease, edema, consolidation or free fluid.  D:  11/29/2017 E:  11/29/2017  This report was finalized on 11/29/2017 3:53 PM by Dr. Hardy Saldaña MD.    (    ASSESSMENT: The patient is a very pleasant 76-year-old female with stage IB  adenocarcinoma of the lung.    PROBLEM LIST:  1. Stage IB poorly differentiated adenocarcinoma of the lung, [id number]:  A. Diagnosed 08/03/2015 after CT-guided biopsy.  B. Status post left upper lobe resection with mediastinal lymph node sampling  done by Dr. Herrera on 08/26/2015.  C. New right upper lobe lung nodule with left adrenal nodule, both were hypermetabolic active on PET scan done on November 6, 2017  2.  Stage IB right upper lobe adenocarcinoma [id number]:  A. presented with hypermetabolic nodule on a screening PET scan.  B.  Status post surgical resection with a clean margins done by Dr. Herrera November 30 2017  C.  Final pathology revealed 1.7 adenocarcinoma with pleural involvement negative hilar and mediastinal nodes and clear surgical margins  4.  Hypertension  5.  Hypercholesterolemia  6.  Heartburn    PLAN:  1. I  did go over the final pathology report in details with the patient and her .  2.  The patient is scheduled to see Dr. Guzmán from surgical oncology next week regarding evaluation for laparoscopic left adrenalectomy.  3.  The patient will follow-up with me in 6 weeks to go over the final pathology report.   4. She will have repeat CBC and CMP prior to return.  5.  We'll continue hydrochlorothiazide with Benicar for hypertension  6.  Continue simvastatin for hypercholesterolemia  6.  We'll continue omeprazole 20 mg daily for heartburn  7.  I'll present the patient case at lung cancer tumor Board next week.  Luisana Washburn MD  12/13/2017

## 2017-12-19 ENCOUNTER — OFFICE VISIT (OUTPATIENT)
Dept: CARDIAC SURGERY | Facility: CLINIC | Age: 77
End: 2017-12-19

## 2017-12-19 ENCOUNTER — LAB (OUTPATIENT)
Dept: LAB | Facility: HOSPITAL | Age: 77
End: 2017-12-19
Attending: SURGERY

## 2017-12-19 VITALS
HEIGHT: 65 IN | OXYGEN SATURATION: 92 % | TEMPERATURE: 97.3 F | BODY MASS INDEX: 35.29 KG/M2 | HEART RATE: 87 BPM | SYSTOLIC BLOOD PRESSURE: 122 MMHG | WEIGHT: 211.8 LBS | DIASTOLIC BLOOD PRESSURE: 76 MMHG

## 2017-12-19 DIAGNOSIS — Z90.2 STATUS POST LOBECTOMY OF LUNG: Primary | ICD-10-CM

## 2017-12-19 DIAGNOSIS — R91.1 LUNG NODULE: ICD-10-CM

## 2017-12-19 DIAGNOSIS — E27.9 ADRENAL HYPERTENSION (HCC): Primary | ICD-10-CM

## 2017-12-19 DIAGNOSIS — I15.2 ADRENAL HYPERTENSION (HCC): Primary | ICD-10-CM

## 2017-12-19 PROCEDURE — 83835 ASSAY OF METANEPHRINES: CPT

## 2017-12-19 PROCEDURE — 84244 ASSAY OF RENIN: CPT

## 2017-12-19 PROCEDURE — 36415 COLL VENOUS BLD VENIPUNCTURE: CPT

## 2017-12-19 PROCEDURE — 99024 POSTOP FOLLOW-UP VISIT: CPT | Performed by: PHYSICIAN ASSISTANT

## 2017-12-19 NOTE — PROGRESS NOTES
12/19/2017  Patient Information  America Nix                                                                                          [address] [place] [address]   1940  'PCP/Referring Physician'  Hai Solomon MD  [phone]  No ref. provider found    Chief Complaint   Patient presents with   • Post-op Follow-up     RU Lobectomy 11-30-17        History of Present Illness:  Patient presents to office today for postoperative follow-up of 11/30/17 right VATS with right upper lobectomy.  Patient complains of some incisional soreness, but denies any shortness of breath.  The patient has been ambulating well without any difficulty.  She was seen by Dr. Washburn on 12/13/17, who reviewed her final pathology report with her and her .  The patient has been seen by surgical oncology, Dr. Guzmán, who is evaluating the patient for possible laparoscopic left adrenalectomy.  The patient underwent a left upper lobectomy on 8/26/15 showing stage IB adenocarcinoma.  The patient's right upper lobe was also stage IB adenocarcinoma with tumor involvement of the pleura.  Mrs. Nix will need her chest tube sutures removed today in office.  She is otherwise doing well and looking forward to driving.    Patient Active Problem List   Diagnosis   • Cancer of upper lobe of left lung   • Lung nodule   • HTN (hypertension)   • Hypercholesteremia   • GERD (gastroesophageal reflux disease)   • S/P lobectomy of lung     Past Medical History:   Diagnosis Date   • Arthritis    • GERD (gastroesophageal reflux disease)    • Hypercholesteremia    • Hypertension    • Lung cancer 2015    LEFT LUNG LOBECTOMY    • Lung cancer 2017    RIGHT    • On home O2     2.5 L HS,    • Pneumothorax 2015    AFTER LUNG BIOPSY    • Wears dentures    • Wears glasses      Past Surgical History:   Procedure Laterality Date   • BREAST BIOPSY Right    • BREAST LUMPECTOMY Left    • BRONCHOSCOPY N/A 11/30/2017    Procedure: BRONCHOSCOPY;   Surgeon: Jeremy Herrera MD;  Location:  [facility];  Service:    • CHEST TUBE INSERTION  2015    LEFT LUNG AFTER LUNG BIOPSY D/T PNEUMOTHORAX    • COLONOSCOPY  2015   • HYSTERECTOMY     • LUNG BIOPSY Bilateral     LEFT IN 2015, RIGHT IN 2017    • LUNG LOBECTOMY Left 08/2015    [facility] PER DR HERRERA    • THORACOSCOPY VIDEO ASSISTED WITH LOBECTOMY Right 11/30/2017    Procedure: THORACOSCOPY VIDEO ASSISTED WITH RIGHT UPPER LOBE WEDGE RESECTION AND COMPLETION OF RIGHT UPPER LOBECTOMY, MEDIASTINAL LYMPH NODE DISSECTION AND INTERCOSTAL NERVE BLOCKS;  Surgeon: Jeremy Herrera MD;  Location:  [facility];  Service:        Current Outpatient Prescriptions:   •  Ascorbic Acid (VITAMIN C PO), Take 1,000 mg by mouth Daily., Disp: , Rfl:   •  aspirin 81 MG tablet, Take 81 mg by mouth Daily., Disp: , Rfl:   •  Cholecalciferol (VITAMIN D3 PO), Take 5,000 Units by mouth Daily., Disp: , Rfl:   •  CRANBERRY PO, Take 1,000 mg by mouth Daily. 2, 500MG CAPSULES DAILY, Disp: , Rfl:   •  docusate sodium (COLACE) 100 MG capsule, Take 100 mg by mouth Daily., Disp: , Rfl:   •  ezetimibe (ZETIA) 10 MG tablet, Take 10 mg by mouth Every Night., Disp: , Rfl:   •  guaiFENesin (MUCINEX) 600 MG 12 hr tablet, Take 1,200 mg by mouth 2 (Two) Times a Day., Disp: , Rfl:   •  loratadine (CLARITIN) 10 MG tablet, Take 10 mg by mouth Daily., Disp: , Rfl:   •  mometasone-formoterol (DULERA 100) 100-5 MCG/ACT inhaler, Inhale 2 puffs Daily As Needed (SHORTNESS OF AIR)., Disp: , Rfl:   •  Multiple Vitamin tablet, Take 1 tablet by mouth Daily., Disp: , Rfl:   •  olmesartan-hydrochlorothiazide (BENICAR HCT) 40-12.5 MG per tablet, Take 1 tablet by mouth Daily., Disp: , Rfl:   •  simethicone (MYLICON) 125 MG chewable tablet, Chew 125 mg Every 6 (Six) Hours As Needed for Flatulence., Disp: , Rfl:   •  simvastatin (ZOCOR) 80 MG tablet, Take 80 mg by mouth Every Night., Disp: , Rfl:   •  Triamcinolone Acetonide (NASACORT AQ NA), 2 sprays into each nostril Daily., Disp: , Rfl:  "  No current facility-administered medications for this visit.     Facility-Administered Medications Ordered in Other Visits:   •  Chlorhexidine Gluconate Cloth 2 % pads 1 application, 1 application, Topical, Q12H PRN, BLANCA Rios  Allergies   Allergen Reactions   • Pneumococcal Vaccines Swelling and Other (See Comments)     REDNESS AND SWELLING OF ARM    • Codeine Nausea And Vomiting   • Hydrocodone Nausea And Vomiting     Social History     Social History   • Marital status:      Spouse name: N/A   • Number of children: 0   • Years of education: N/A     Occupational History   • retired      [employer]      Social History Main Topics   • Smoking status: Former Smoker     Packs/day: 0.50     Years: 35.00     Quit date:    • Smokeless tobacco: Never Used      Comment: smoked up to 2ppd at times   • Alcohol use No   • Drug use: No   • Sexual activity: Not on file     Other Topics Concern   • Not on file     Social History Narrative    Lives in [facility] with spouse.      Family History   Problem Relation Age of Onset   • Hypertension Mother    • Heart attack Mother    • Heart attack Father    • Other Sister      renal mass   • Leukemia Maternal Aunt      ROS: As per HPI, otherwise negative.   Vitals:    17 0846   BP: 122/76   BP Location: Right arm   Patient Position: Sitting   Pulse: 87   Temp: 97.3 °F (36.3 °C)   TempSrc: Temporal Artery    SpO2: 92%   Weight: 96.1 kg (211 lb 12.8 oz)   Height: 165.1 cm (65\")      Physical Exam:  Gen - NAD, pleasant, cooperative  CV - Regular rate and rhythm, no murmur gallop or rub  Pulm - Lungs clear to auscultation without wheeze or rhonchi   GI - Soft, normoactive bowel sounds, non-tender  Ext - Without edema  Incision - Healing well, no evidence of incisional dehiscence or cellulitis    Labs/Imagin17 CXR  Good quality chest radiograph. Bony structures are within normal limits. Trachea is midline. Left and right lung fields clear, costophrenic " angles are sharp. No evidence of pleural effusion or pneumothorax. Compared to most recent chest xray (12/3/17), there is no acute chest pathology.     Assessment/Plan:  Patient is a 77-year-old  female with history of stage IB left upper lobe adenocarcinoma status post resection, stage IB right upper lobe adenocarcinoma status post resection with PET positive left adrenal nodule.  Patient is having a steady postoperative course.  Chest tube sutures were removed without incident today in office.  She has been seen by Dr. Washburn who discussed her pathology report with her and her .  She has been seen by Dr. Guzmán, with surgical oncology, and is currently undergoing evaluation for possible laparoscopic left adrenalectomy.  Her incisions are healing well and her chest x-ray is within normal limits.  She may begin driving, as long as she is not taking any pain medication.  She will need to follow up with our office in 3 months with a CT scan of the chest.  She should call with any questions or concerns, should any arise during the interval.    Patient Active Problem List   Diagnosis   • Cancer of upper lobe of left lung   • Lung nodule   • HTN (hypertension)   • Hypercholesteremia   • GERD (gastroesophageal reflux disease)   • S/P lobectomy of lung     Signed by:

## 2017-12-20 NOTE — PROGRESS NOTES
I have reviewed the notes, assessments, and/or procedures performed by Rehan Steele, I concur with his documentation of America Nix.  She will need to complete the workup to rule out pheochromocytoma.  Will await Dr. Guzmán's decision regarding adrenalectomy.  Follow-up in clinic 3 months after CT chest.

## 2017-12-21 ENCOUNTER — APPOINTMENT (OUTPATIENT)
Dept: LAB | Facility: HOSPITAL | Age: 77
End: 2017-12-21

## 2017-12-22 PROCEDURE — 84244 ASSAY OF RENIN: CPT

## 2017-12-22 PROCEDURE — 36415 COLL VENOUS BLD VENIPUNCTURE: CPT

## 2017-12-22 PROCEDURE — 83835 ASSAY OF METANEPHRINES: CPT

## 2017-12-28 LAB
ALDOST SERPL-MCNC: 3.9 NG/DL (ref 0–30)
METANEPHRINE, PL: <10 PG/ML (ref 0–62)
NORMETANEPHRINE, PL: 57 PG/ML (ref 0–145)

## 2017-12-30 LAB — RENIN PLAS-CCNC: 7.49 NG/ML/HR (ref 0.17–5.38)

## 2018-01-06 ENCOUNTER — APPOINTMENT (OUTPATIENT)
Dept: GENERAL RADIOLOGY | Facility: HOSPITAL | Age: 78
End: 2018-01-06

## 2018-01-06 ENCOUNTER — APPOINTMENT (OUTPATIENT)
Dept: CT IMAGING | Facility: HOSPITAL | Age: 78
End: 2018-01-06

## 2018-01-06 ENCOUNTER — HOSPITAL ENCOUNTER (EMERGENCY)
Facility: HOSPITAL | Age: 78
Discharge: HOME | End: 2018-01-06
Attending: EMERGENCY MEDICINE | Admitting: EMERGENCY MEDICINE

## 2018-01-06 VITALS
HEART RATE: 89 BPM | RESPIRATION RATE: 18 BRPM | TEMPERATURE: 98.5 F | BODY MASS INDEX: 34.99 KG/M2 | DIASTOLIC BLOOD PRESSURE: 68 MMHG | OXYGEN SATURATION: 94 % | SYSTOLIC BLOOD PRESSURE: 130 MMHG | HEIGHT: 65 IN | WEIGHT: 210 LBS

## 2018-01-06 DIAGNOSIS — R05.9 COUGH: Primary | ICD-10-CM

## 2018-01-06 DIAGNOSIS — R07.81 RIB PAIN ON RIGHT SIDE: ICD-10-CM

## 2018-01-06 LAB
ALBUMIN SERPL-MCNC: 3.7 G/DL (ref 3.5–5)
ALBUMIN/GLOB SERPL: 1.4 G/DL (ref 1–2)
ALP SERPL-CCNC: 67 U/L (ref 38–126)
ALT SERPL W P-5'-P-CCNC: 32 U/L (ref 13–69)
ANION GAP SERPL CALCULATED.3IONS-SCNC: 12.2 MMOL/L
AST SERPL-CCNC: 27 U/L (ref 15–46)
BASOPHILS # BLD AUTO: 0.03 10*3/MM3 (ref 0–0.2)
BASOPHILS NFR BLD AUTO: 0.4 % (ref 0–2.5)
BILIRUB SERPL-MCNC: 0.3 MG/DL (ref 0.2–1.3)
BUN BLD-MCNC: 29 MG/DL (ref 7–20)
BUN/CREAT SERPL: 22.3 (ref 7.1–23.5)
CALCIUM SPEC-SCNC: 9.8 MG/DL (ref 8.4–10.2)
CHLORIDE SERPL-SCNC: 103 MMOL/L (ref 98–107)
CO2 SERPL-SCNC: 30 MMOL/L (ref 26–30)
CREAT BLD-MCNC: 1.3 MG/DL (ref 0.6–1.3)
DEPRECATED RDW RBC AUTO: 45.7 FL (ref 37–54)
EOSINOPHIL # BLD AUTO: 0.31 10*3/MM3 (ref 0–0.7)
EOSINOPHIL NFR BLD AUTO: 3.7 % (ref 0–7)
ERYTHROCYTE [DISTWIDTH] IN BLOOD BY AUTOMATED COUNT: 13.5 % (ref 11.5–14.5)
FLUAV AG NPH QL: NEGATIVE
FLUBV AG NPH QL IA: NEGATIVE
GFR SERPL CREATININE-BSD FRML MDRD: 40 ML/MIN/1.73
GLOBULIN UR ELPH-MCNC: 2.7 GM/DL
GLUCOSE BLD-MCNC: 100 MG/DL (ref 74–98)
HCT VFR BLD AUTO: 33.1 % (ref 37–47)
HGB BLD-MCNC: 10.4 G/DL (ref 12–16)
IMM GRANULOCYTES # BLD: 0.03 10*3/MM3 (ref 0–0.06)
IMM GRANULOCYTES NFR BLD: 0.4 % (ref 0–0.6)
LYMPHOCYTES # BLD AUTO: 0.49 10*3/MM3 (ref 0.6–3.4)
LYMPHOCYTES NFR BLD AUTO: 5.9 % (ref 10–50)
MCH RBC QN AUTO: 29.5 PG (ref 27–31)
MCHC RBC AUTO-ENTMCNC: 31.4 G/DL (ref 30–37)
MCV RBC AUTO: 93.8 FL (ref 81–99)
MONOCYTES # BLD AUTO: 1.59 10*3/MM3 (ref 0–0.9)
MONOCYTES NFR BLD AUTO: 19.2 % (ref 0–12)
NEUTROPHILS # BLD AUTO: 5.83 10*3/MM3 (ref 2–6.9)
NEUTROPHILS NFR BLD AUTO: 70.4 % (ref 37–80)
NRBC BLD MANUAL-RTO: 0 /100 WBC (ref 0–0)
NT-PROBNP SERPL-MCNC: 254 PG/ML (ref 0–450)
PLATELET # BLD AUTO: 199 10*3/MM3 (ref 130–400)
PMV BLD AUTO: 10.6 FL (ref 6–12)
POTASSIUM BLD-SCNC: 4.2 MMOL/L (ref 3.5–5.1)
PROT SERPL-MCNC: 6.4 G/DL (ref 6.3–8.2)
RBC # BLD AUTO: 3.53 10*6/MM3 (ref 4.2–5.4)
SODIUM BLD-SCNC: 141 MMOL/L (ref 137–145)
TROPONIN I SERPL-MCNC: <0.012 NG/ML (ref 0–0.03)
WBC NRBC COR # BLD: 8.28 10*3/MM3 (ref 4.8–10.8)

## 2018-01-06 PROCEDURE — 87804 INFLUENZA ASSAY W/OPTIC: CPT | Performed by: EMERGENCY MEDICINE

## 2018-01-06 PROCEDURE — 80053 COMPREHEN METABOLIC PANEL: CPT | Performed by: EMERGENCY MEDICINE

## 2018-01-06 PROCEDURE — 83880 ASSAY OF NATRIURETIC PEPTIDE: CPT | Performed by: EMERGENCY MEDICINE

## 2018-01-06 PROCEDURE — 71250 CT THORAX DX C-: CPT

## 2018-01-06 PROCEDURE — 84484 ASSAY OF TROPONIN QUANT: CPT | Performed by: EMERGENCY MEDICINE

## 2018-01-06 PROCEDURE — 99284 EMERGENCY DEPT VISIT MOD MDM: CPT

## 2018-01-06 PROCEDURE — 93005 ELECTROCARDIOGRAM TRACING: CPT | Performed by: EMERGENCY MEDICINE

## 2018-01-06 PROCEDURE — 85025 COMPLETE CBC W/AUTO DIFF WBC: CPT | Performed by: EMERGENCY MEDICINE

## 2018-01-06 RX ORDER — AMOXICILLIN AND CLAVULANATE POTASSIUM 875; 125 MG/1; MG/1
1 TABLET, FILM COATED ORAL EVERY 12 HOURS SCHEDULED
Qty: 14 TABLET | Refills: 0 | Status: SHIPPED | OUTPATIENT
Start: 2018-01-06 | End: 2018-01-13

## 2018-01-06 RX ADMIN — HYDROCODONE POLISTIREX AND CHLORPHENIRAMINE POLISTIREX 5 ML: 10; 8 SUSPENSION, EXTENDED RELEASE ORAL at 12:36

## 2018-01-06 NOTE — ED PROVIDER NOTES
Subjective   HPI Comments: 77-year-old female presenting with right-sided chest pain.  She states that yesterday he had a coughing spell and increased pain in her right chest, she tells me that she recently had a right upper lobectomy for lung cancer.  Has had some increased shortness of breath since then.  She has had some nasal and chest congestion, body aches.  She denies any fevers, chills, nausea, vomiting, abdominal pain.      Review of Systems   Constitutional: Negative for chills and fever.   HENT: Negative for congestion, rhinorrhea and sore throat.    Eyes: Negative for pain.   Respiratory: Positive for cough and shortness of breath.    Cardiovascular: Positive for chest pain. Negative for palpitations and leg swelling.   Gastrointestinal: Negative for abdominal pain, diarrhea, nausea and vomiting.   Genitourinary: Negative for dysuria.   Musculoskeletal: Positive for arthralgias.   Skin: Negative for rash.   Neurological: Negative for weakness and numbness.   Psychiatric/Behavioral: Negative for behavioral problems.       Past Medical History:   Diagnosis Date   • Arthritis    • GERD (gastroesophageal reflux disease)    • Hypercholesteremia    • Hypertension    • Lung cancer 2015    LEFT LUNG LOBECTOMY    • Lung cancer 2017    RIGHT    • On home O2     2.5 L HS,    • Pneumothorax 2015    AFTER LUNG BIOPSY    • Wears dentures    • Wears glasses        Allergies   Allergen Reactions   • Pneumococcal Vaccines Swelling and Other (See Comments)     REDNESS AND SWELLING OF ARM    • Codeine Nausea And Vomiting   • Hydrocodone Nausea And Vomiting       Past Surgical History:   Procedure Laterality Date   • BREAST BIOPSY Right    • BREAST LUMPECTOMY Left    • BRONCHOSCOPY N/A 11/30/2017    Procedure: BRONCHOSCOPY;  Surgeon: Jeremy Herrera MD;  Location: [facility];  Service:    • CHEST TUBE INSERTION  2015    LEFT LUNG AFTER LUNG BIOPSY D/T PNEUMOTHORAX    • COLONOSCOPY  2015   • HYSTERECTOMY     • LUNG BIOPSY  Bilateral     LEFT IN 2015, RIGHT IN 2017    • LUNG LOBECTOMY Left 08/2015    [facility] PER DR HERRERA    • THORACOSCOPY VIDEO ASSISTED WITH LOBECTOMY Right 11/30/2017    Procedure: THORACOSCOPY VIDEO ASSISTED WITH RIGHT UPPER LOBE WEDGE RESECTION AND COMPLETION OF RIGHT UPPER LOBECTOMY, MEDIASTINAL LYMPH NODE DISSECTION AND INTERCOSTAL NERVE BLOCKS;  Surgeon: Jeremy Herrera MD;  Location: [facility];  Service:        Family History   Problem Relation Age of Onset   • Hypertension Mother    • Heart attack Mother    • Heart attack Father    • Other Sister      renal mass   • Leukemia Maternal Aunt        Social History     Social History   • Marital status:      Spouse name: N/A   • Number of children: 0   • Years of education: N/A     Occupational History   • retired      [employer]      Social History Main Topics   • Smoking status: Former Smoker     Packs/day: 0.50     Years: 35.00     Quit date: 2015   • Smokeless tobacco: Never Used      Comment: smoked up to 2ppd at times   • Alcohol use No   • Drug use: No   • Sexual activity: Not Asked     Other Topics Concern   • None     Social History Narrative    Lives in [facility] with spouse.            Objective   Physical Exam   Constitutional: She is oriented to person, place, and time. She appears well-developed and well-nourished. No distress.   HENT:   Head: Normocephalic and atraumatic.   Right Ear: External ear normal.   Left Ear: External ear normal.   Nose: Nose normal.   Mouth/Throat: Oropharynx is clear and moist.   Eyes: Conjunctivae and EOM are normal. Pupils are equal, round, and reactive to light.   Neck: Normal range of motion. Neck supple.   Cardiovascular: Normal rate, regular rhythm, normal heart sounds and intact distal pulses.    Pulmonary/Chest: Effort normal and breath sounds normal. No respiratory distress. She has no wheezes. She has no rales. She exhibits no tenderness.   Abdominal: Soft. Bowel sounds are normal. She exhibits no distension.  There is no tenderness. There is no rebound and no guarding.   Musculoskeletal: Normal range of motion. She exhibits no edema, tenderness or deformity.   Neurological: She is alert and oriented to person, place, and time.   Skin: Skin is warm and dry. No rash noted.   Psychiatric: She has a normal mood and affect. Her behavior is normal.   Nursing note and vitals reviewed.      Procedures         ED Course  ED Course                  MDM  Number of Diagnoses or Management Options  Cough:   Rib pain on right side:   Diagnosis management comments: 77-year-old female presenting with cough, right-sided chest pain and shortness of breath.  Well-developed, well-nourished elderly female in no distress with vital signs and exam as above.  We'll check labs, EKG.  Given she just had surgery and is having pleuritic pain in her right chest with increased shortness of breath we'll obtain CT scan of the chest.  Disposition pending workup.    DDX: PE, pneumonia, CHF, ACS, rib fracture    EKG: Sinus rhythm, normal rate, no acute ST/T changes    Lab work is unremarkable.  The scan shows trace pleural effusion on the right.  She is feeling better after treatment.  We did discuss that we could be missing a pulmonary embolus based on the noncontrasted CT scan.  She is comfortable with this risk.  We'll treat as bronchitis.  We'll also discuss treating with Tamiflu and she declines.  We'll have her follow-up closely with her primary doctor.  Strict return precautions discussed.       Amount and/or Complexity of Data Reviewed  Decide to obtain previous medical records or to obtain history from someone other than the patient: yes        Final diagnoses:   Cough   Rib pain on right side            Lior Cohen MD  01/06/18 [id number]

## 2018-01-19 ENCOUNTER — TRANSCRIBE ORDERS (OUTPATIENT)
Dept: ADMINISTRATIVE | Facility: HOSPITAL | Age: 78
End: 2018-01-19

## 2018-01-19 DIAGNOSIS — E27.8 ADRENAL NODULE (HCC): Primary | ICD-10-CM

## 2018-01-24 ENCOUNTER — OFFICE VISIT (OUTPATIENT)
Dept: ONCOLOGY | Facility: CLINIC | Age: 78
End: 2018-01-24

## 2018-01-24 ENCOUNTER — TELEPHONE (OUTPATIENT)
Dept: ONCOLOGY | Facility: CLINIC | Age: 78
End: 2018-01-24

## 2018-01-24 VITALS
BODY MASS INDEX: 34.95 KG/M2 | TEMPERATURE: 97.4 F | DIASTOLIC BLOOD PRESSURE: 71 MMHG | SYSTOLIC BLOOD PRESSURE: 125 MMHG | WEIGHT: 210 LBS | HEART RATE: 103 BPM | RESPIRATION RATE: 15 BRPM

## 2018-01-24 DIAGNOSIS — C34.12 CANCER OF UPPER LOBE OF LEFT LUNG (HCC): Primary | ICD-10-CM

## 2018-01-24 PROCEDURE — 99214 OFFICE O/P EST MOD 30 MIN: CPT | Performed by: INTERNAL MEDICINE

## 2018-01-24 RX ORDER — MEGESTROL ACETATE 40 MG/ML
800 SUSPENSION ORAL DAILY
Qty: 480 ML | Refills: 2 | Status: ON HOLD | OUTPATIENT
Start: 2018-01-24 | End: 2018-04-06

## 2018-01-24 NOTE — TELEPHONE ENCOUNTER
----- Message from Miracle He sent at 1/24/2018  3:21 PM EST -----  Regarding: DR SIBLEY  PT CALLED AND STATED THAT DR WEBBER CANCEL HER APT AGAIN. SHE WANTED DR SIBLEY TO KNOW. MAYBE HE CAN CALL AND SEE WHAT'S GOING ON THANKS

## 2018-01-24 NOTE — TELEPHONE ENCOUNTER
----- Message from Miracle eH sent at 1/24/2018  3:21 PM EST -----  Regarding: DR SIBLEY  PT CALLED AND STATED THAT DR WEBBER CANCEL HER APT AGAIN. SHE WANTED DR SIBLEY TO KNOW. MAYBE HE CAN CALL AND SEE WHAT'S GOING ON THANKS

## 2018-01-24 NOTE — PROGRESS NOTES
DATE OF VISIT: 1/24/2018    REASON FOR VISIT: Followup for:  A. Stage IB, [id number], poorly differentiated adenocarcinoma of the lung.  B. Stage IB [id number]  right upper lobe poorly differentiated adenocarcinoma  C. Left adrenal hypermetabolic nodule    HISTORY OF PRESENT ILLNESS: The patient is a very pleasant 76-year-old female with past medical history significant for poorly differentiated adenocarcinoma of the lung, [id number], tumor size 3.2 cm in the left upper lobe. The patient is status post left upper lobe resection with mediastinal lymph node sampling done by Dr. Jeremy Herrera on 08/26/2015. The patient is here today in scheduled  followup visit.    SUBJECTIVE: The patient was able to tolerate her right thoracotomy fairly well.  She is complaining of mild pain surgical site.  She denied any headaches,  denied any night sweats. He breathing is back to normal. Her appetite is good.  She denied any abdominal pain.     REVIEW OF SYSTEMS: All the other systems are reviewed by me and negative  except what is mentioned in HPI and subjectives.     PAST MEDICAL HISTORY/SOCIAL HISTORY/FAMILY HISTORY: Unchanged from my prior  documentation done on 08/13/2015.      Current Outpatient Prescriptions:   •  Ascorbic Acid (VITAMIN C PO), Take 1,000 mg by mouth Daily., Disp: , Rfl:   •  aspirin 81 MG tablet, Take 81 mg by mouth Daily., Disp: , Rfl:   •  Cholecalciferol (VITAMIN D3 PO), Take 5,000 Units by mouth Daily., Disp: , Rfl:   •  CRANBERRY PO, Take 1,000 mg by mouth Daily. 2, 500MG CAPSULES DAILY, Disp: , Rfl:   •  docusate sodium (COLACE) 100 MG capsule, Take 100 mg by mouth Daily., Disp: , Rfl:   •  ezetimibe (ZETIA) 10 MG tablet, Take 10 mg by mouth Every Night., Disp: , Rfl:   •  guaiFENesin (MUCINEX) 600 MG 12 hr tablet, Take 1,200 mg by mouth 2 (Two) Times a Day., Disp: , Rfl:   •  HYDROcod Polst-CPM Polst ER (TUSSIONEX PENNKINETIC ER) 10-8 MG/5ML ER suspension, Take 5 mL by mouth Every 12 (Twelve) Hours As Needed for  Cough., Disp: 115 mL, Rfl: 0  •  loratadine (CLARITIN) 10 MG tablet, Take 10 mg by mouth Daily., Disp: , Rfl:   •  mometasone-formoterol (DULERA 100) 100-5 MCG/ACT inhaler, Inhale 2 puffs Daily As Needed (SHORTNESS OF AIR)., Disp: , Rfl:   •  Multiple Vitamin tablet, Take 1 tablet by mouth Daily., Disp: , Rfl:   •  olmesartan-hydrochlorothiazide (BENICAR HCT) 40-12.5 MG per tablet, Take 1 tablet by mouth Daily., Disp: , Rfl:   •  simethicone (MYLICON) 125 MG chewable tablet, Chew 125 mg Every 6 (Six) Hours As Needed for Flatulence., Disp: , Rfl:   •  simvastatin (ZOCOR) 80 MG tablet, Take 80 mg by mouth Every Night., Disp: , Rfl:   •  Triamcinolone Acetonide (NASACORT AQ NA), 2 sprays into each nostril Daily., Disp: , Rfl:   No current facility-administered medications for this visit.     Facility-Administered Medications Ordered in Other Visits:   •  Chlorhexidine Gluconate Cloth 2 % pads 1 application, 1 application, Topical, Q12H PRN, BLANCA Rios    PHYSICAL EXAMINATION:   There were no vitals taken for this visit.  ECOG1  GENERAL: Age appropriate. No acute distress.   HEENT: Head atraumatic, normocephalic.   NECK: Supple. No JVD. No lymphadenopathy.   LUNGS: Clear to auscultation bilaterally. No wheezing. No rhonchi.   HEART: Regular rate and rhythm. S1, S2, no murmurs.   ABDOMEN: Soft, nontender, nondistended. Bowel sounds positive. No  hepatosplenomegaly.   EXTREMITIES: No clubbing, cyanosis, +1 bilateral pedal edema.   SKIN: No rashes. No purpura.   NEUROLOGIC: Awake and oriented x3. Strength 5 out of 5 in all muscle groups.     No visits with results within 2 Week(s) from this visit.  Latest known visit with results is:    Admission on 01/06/2018, Discharged on 01/06/2018   Component Date Value Ref Range Status   • Influenza A Ag, EIA 01/06/2018 Negative  Negative Final   • Influenza B Ag, EIA 01/06/2018 Negative  Negative Final   • Glucose 01/06/2018 100* 74 - 98 mg/dL Final   • BUN 01/06/2018 29*  7 - 20 mg/dL Final   • Creatinine 01/06/2018 1.30  0.60 - 1.30 mg/dL Final   • Sodium 01/06/2018 141  137 - 145 mmol/L Final   • Potassium 01/06/2018 4.2  3.5 - 5.1 mmol/L Final   • Chloride 01/06/2018 103  98 - 107 mmol/L Final   • CO2 01/06/2018 30.0  26.0 - 30.0 mmol/L Final   • Calcium 01/06/2018 9.8  8.4 - 10.2 mg/dL Final   • Total Protein 01/06/2018 6.4  6.3 - 8.2 g/dL Final   • Albumin 01/06/2018 3.70  3.50 - 5.00 g/dL Final   • ALT (SGPT) 01/06/2018 32  13 - 69 U/L Final   • AST (SGOT) 01/06/2018 27  15 - 46 U/L Final   • Alkaline Phosphatase 01/06/2018 67  38 - 126 U/L Final   • Total Bilirubin 01/06/2018 0.3  0.2 - 1.3 mg/dL Final   • eGFR Non African Amer 01/06/2018 40* >60 mL/min/1.73 Final   • Globulin 01/06/2018 2.7  gm/dL Final   • A/G Ratio 01/06/2018 1.4  1.0 - 2.0 g/dL Final   • BUN/Creatinine Ratio 01/06/2018 22.3  7.1 - 23.5 Final   • Anion Gap 01/06/2018 12.2  mmol/L Final   • Troponin I 01/06/2018 <0.012  0.000 - 0.034 ng/mL Final   • proBNP 01/06/2018 254.0  0.0 - 450.0 pg/mL Final   • WBC 01/06/2018 8.28  4.80 - 10.80 10*3/mm3 Final   • RBC 01/06/2018 3.53* 4.20 - 5.40 10*6/mm3 Final   • Hemoglobin 01/06/2018 10.4* 12.0 - 16.0 g/dL Final   • Hematocrit 01/06/2018 33.1* 37.0 - 47.0 % Final   • MCV 01/06/2018 93.8  81.0 - 99.0 fL Final   • MCH 01/06/2018 29.5  27.0 - 31.0 pg Final   • MCHC 01/06/2018 31.4  30.0 - 37.0 g/dL Final   • RDW 01/06/2018 13.5  11.5 - 14.5 % Final   • RDW-SD 01/06/2018 45.7  37.0 - 54.0 fl Final   • MPV 01/06/2018 10.6  6.0 - 12.0 fL Final   • Platelets 01/06/2018 199  130 - 400 10*3/mm3 Final   • Neutrophil % 01/06/2018 70.4  37.0 - 80.0 % Final   • Lymphocyte % 01/06/2018 5.9* 10.0 - 50.0 % Final   • Monocyte % 01/06/2018 19.2* 0.0 - 12.0 % Final   • Eosinophil % 01/06/2018 3.7  0.0 - 7.0 % Final   • Basophil % 01/06/2018 0.4  0.0 - 2.5 % Final   • Immature Grans % 01/06/2018 0.4  0.0 - 0.6 % Final   • Neutrophils, Absolute 01/06/2018 5.83  2.00 - 6.90 10*3/mm3  Final   • Lymphocytes, Absolute 01/06/2018 0.49* 0.60 - 3.40 10*3/mm3 Final   • Monocytes, Absolute 01/06/2018 1.59* 0.00 - 0.90 10*3/mm3 Final   • Eosinophils, Absolute 01/06/2018 0.31  0.00 - 0.70 10*3/mm3 Final   • Basophils, Absolute 01/06/2018 0.03  0.00 - 0.20 10*3/mm3 Final   • Immature Grans, Absolute 01/06/2018 0.03  0.00 - 0.06 10*3/mm3 Final   • nRBC 01/06/2018 0.0  0.0 - 0.0 /100 WBC Final      Ct Chest Without Contrast    Result Date: 1/6/2018  Narrative: FINAL REPORT TECHNIQUE: Axial images were obtained from the lung apex to the mid abdomen by computed tomography.This study was performed with techniques to keep radiation doses as low as reasonably achievable, (ALARA). Individualized dose reduction techniques using automated exposure control or adjustment of mA and/or kV according to the patient''s size were employed. CLINICAL HISTORY: R sided pleuritic chest pain, increased sob, recent lobectomy for lung cancer FINDINGS: There is no axillary adenopathy. There is no hilar or mediastinal adenopathy. Heart size is normal. There a trace right pleural effusion. No left pleural or pericardial effusion is seen. Limited images of the upper abdomen demonstrate severe left renal atrophy. No suspicious infiltrate or nodule is identified. Scarring is seen in the anterior right lung, presumably postoperative.     Impression: Scarring in the anterior right lung, presumably postoperative with trace right pleural effusion. Authenticated by TRAVIS Piedra MD on 01/06/2018 03:05:54 PM  (    ASSESSMENT: The patient is a very pleasant 76-year-old female with stage IB  adenocarcinoma of the lung.    PROBLEM LIST:  1. Stage IB poorly differentiated adenocarcinoma of the lung, [id number]:  A. Diagnosed 08/03/2015 after CT-guided biopsy.  B. Status post left upper lobe resection with mediastinal lymph node sampling  done by Dr. Herrera on 08/26/2015.  C. New right upper lobe lung nodule with left adrenal nodule, both were  hypermetabolic active on PET scan done on November 6, 2017  2.  Stage IB right upper lobe adenocarcinoma [id number]:  A. presented with hypermetabolic nodule on a screening PET scan.  B.  Status post surgical resection with a clean margins done by Dr. Herrera November 30 2017  C.  Final pathology revealed 1.7 adenocarcinoma with pleural involvement negative hilar and mediastinal nodes and clear surgical margins  4.  Hypertension  5.  Hypercholesterolemia  6.  Heartburn    PLAN:  1.  The patient is scheduled see Dr. Guzmán tomorrow, her appointment was rescheduled  regarding evaluation for laparoscopic left adrenalectomy.  2.  She will have repeat CBC and CMP prior to return.  3.  I would follow-up on the pathology report from her adrenal gland surgery.  4.  The patient would have scans prior to return.  5.  We'll continue hydrochlorothiazide with Benicar for hypertension  6.  Continue simvastatin for hypercholesterolemia  6.  We'll continue omeprazole 20 mg daily for heartburn  Luisana Washburn MD  1/24/2018

## 2018-02-01 ENCOUNTER — HOSPITAL ENCOUNTER (OUTPATIENT)
Dept: CT IMAGING | Facility: HOSPITAL | Age: 78
Discharge: HOME | End: 2018-02-01

## 2018-02-01 ENCOUNTER — HOSPITAL ENCOUNTER (OUTPATIENT)
Dept: CT IMAGING | Facility: HOSPITAL | Age: 78
Discharge: HOME | End: 2018-02-01
Attending: SURGERY | Admitting: SURGERY

## 2018-02-01 VITALS
RESPIRATION RATE: 18 BRPM | OXYGEN SATURATION: 99 % | WEIGHT: 207.8 LBS | SYSTOLIC BLOOD PRESSURE: 125 MMHG | HEIGHT: 65 IN | HEART RATE: 91 BPM | DIASTOLIC BLOOD PRESSURE: 68 MMHG | TEMPERATURE: 97.6 F | BODY MASS INDEX: 34.62 KG/M2

## 2018-02-01 DIAGNOSIS — E27.8 ADRENAL NODULE (HCC): ICD-10-CM

## 2018-02-01 LAB
APTT PPP: 25.2 SECONDS (ref 24–31)
INR PPP: 0.94
PLATELET # BLD AUTO: 273 10*3/MM3 (ref 150–450)
PROTHROMBIN TIME: 10.2 SECONDS (ref 9.6–11.5)

## 2018-02-01 PROCEDURE — 85049 AUTOMATED PLATELET COUNT: CPT | Performed by: RADIOLOGY

## 2018-02-01 PROCEDURE — 74150 CT ABDOMEN W/O CONTRAST: CPT

## 2018-02-01 PROCEDURE — 88307 TISSUE EXAM BY PATHOLOGIST: CPT | Performed by: SURGERY

## 2018-02-01 PROCEDURE — 85610 PROTHROMBIN TIME: CPT | Performed by: RADIOLOGY

## 2018-02-01 PROCEDURE — 88341 IMHCHEM/IMCYTCHM EA ADD ANTB: CPT | Performed by: SURGERY

## 2018-02-01 PROCEDURE — 88342 IMHCHEM/IMCYTCHM 1ST ANTB: CPT | Performed by: SURGERY

## 2018-02-01 PROCEDURE — 85730 THROMBOPLASTIN TIME PARTIAL: CPT | Performed by: RADIOLOGY

## 2018-02-01 PROCEDURE — 77012 CT SCAN FOR NEEDLE BIOPSY: CPT

## 2018-02-01 RX ORDER — LIDOCAINE HYDROCHLORIDE 10 MG/ML
20 INJECTION, SOLUTION INFILTRATION; PERINEURAL ONCE
Status: COMPLETED | OUTPATIENT
Start: 2018-02-01 | End: 2018-02-01

## 2018-02-01 RX ORDER — ALPRAZOLAM 0.5 MG/1
0.5 TABLET ORAL ONCE
Status: DISCONTINUED | OUTPATIENT
Start: 2018-02-01 | End: 2018-02-02 | Stop reason: HOSPADM

## 2018-02-01 RX ADMIN — LIDOCAINE HYDROCHLORIDE 20 ML: 10 INJECTION, SOLUTION INFILTRATION; PERINEURAL at 13:25

## 2018-02-01 NOTE — NURSING NOTE
Procedure complete, pt tolerated very well.  Specimens collected for lab.  Site cleaned, 4 x 4 with tegaderm applied. Report called to [facility] nurse and pt returned to [facility] via hospital transport.

## 2018-02-01 NOTE — NURSING NOTE
Pt discharged post procedure, tolerated well. Dressing to posterior flank CDI, no bleeding, redness, swelling, pain and/or drainage noted. No acute distress noted, no complaints voiced. IV removed, cath intact. Pt left with family at side.

## 2018-02-01 NOTE — NURSING NOTE
Pt returned post procedure, tolerated well. Dressing to left posterior flank CDI, no bleeding, swelling, drainage and/or hematoma noted. No acute distress noted, no complaints voiced. Will continue to monitor.

## 2018-02-02 ENCOUNTER — TELEPHONE (OUTPATIENT)
Dept: INTERVENTIONAL RADIOLOGY/VASCULAR | Facility: HOSPITAL | Age: 78
End: 2018-02-02

## 2018-02-02 NOTE — TELEPHONE ENCOUNTER
@[id number])@    Other Comments:

## 2018-02-05 LAB
CYTO UR: NORMAL
LAB AP CASE REPORT: NORMAL
LAB AP CLINICAL INFORMATION: NORMAL
Lab: NORMAL
PATH REPORT.FINAL DX SPEC: NORMAL
PATH REPORT.GROSS SPEC: NORMAL

## 2018-02-16 ENCOUNTER — TELEPHONE (OUTPATIENT)
Dept: OTHER | Facility: HOSPITAL | Age: 78
End: 2018-02-16

## 2018-02-16 ENCOUNTER — TRANSCRIBE ORDERS (OUTPATIENT)
Dept: ADMINISTRATIVE | Facility: HOSPITAL | Age: 78
End: 2018-02-16

## 2018-02-16 DIAGNOSIS — C79.72 MALIGNANT NEOPLASM METASTATIC TO LEFT ADRENAL GLAND (HCC): Primary | ICD-10-CM

## 2018-02-16 NOTE — TELEPHONE ENCOUNTER
Called patient to inquire about plan with Dr. Guzmán. Per patient PET will be ordered and surgery will subsequently be scheduled to remove adrenal gland. Provided lung navigator name and contact information. Instructed patient to call if she had not heard anything by Monday afternoon to ensure expedited scheduling. Notified Dr. Washburn of this as well. Will continue to assist patient as needed. She knows to call with questions or concerns.

## 2018-02-20 ENCOUNTER — TELEPHONE (OUTPATIENT)
Dept: CARDIAC SURGERY | Facility: CLINIC | Age: 78
End: 2018-02-20

## 2018-02-20 NOTE — TELEPHONE ENCOUNTER
Pt called regarding recall letter, 3m F/U with Dr. Herrera. Pt is having  PET scan done at [facility] on 02/23/2018. Verified pt's address, phone number and insurance.

## 2018-02-23 ENCOUNTER — HOSPITAL ENCOUNTER (OUTPATIENT)
Dept: PET IMAGING | Facility: HOSPITAL | Age: 78
Discharge: HOME | End: 2018-02-23
Attending: SURGERY

## 2018-02-23 ENCOUNTER — HOSPITAL ENCOUNTER (OUTPATIENT)
Dept: PET IMAGING | Facility: HOSPITAL | Age: 78
Discharge: HOME | End: 2018-02-23
Attending: SURGERY | Admitting: SURGERY

## 2018-02-23 DIAGNOSIS — C79.72 MALIGNANT NEOPLASM METASTATIC TO LEFT ADRENAL GLAND (HCC): ICD-10-CM

## 2018-02-23 LAB — GLUCOSE BLDC GLUCOMTR-MCNC: 97 MG/DL (ref 70–130)

## 2018-02-23 PROCEDURE — A9552 F18 FDG: HCPCS | Performed by: SURGERY

## 2018-02-23 PROCEDURE — 0 FLUDEOXYGLUCOSE F18 SOLUTION: Performed by: SURGERY

## 2018-02-23 PROCEDURE — 82962 GLUCOSE BLOOD TEST: CPT

## 2018-02-23 PROCEDURE — 78815 PET IMAGE W/CT SKULL-THIGH: CPT

## 2018-02-23 RX ADMIN — FLUDEOXYGLUCOSE F18 1 DOSE: 300 INJECTION INTRAVENOUS at 11:53

## 2018-02-28 ENCOUNTER — TELEPHONE (OUTPATIENT)
Dept: OTHER | Facility: HOSPITAL | Age: 78
End: 2018-02-28

## 2018-02-28 NOTE — TELEPHONE ENCOUNTER
Patient returned nurse navigator phone call from yesterday. Called patient to check on surgery status. States she is supposed to hear from Dr. Guzmán today about plan and will update nurse navigator once she is made aware. She knows to call with questions or concerns.

## 2018-03-05 ENCOUNTER — HOSPITAL ENCOUNTER (OUTPATIENT)
Dept: RADIATION ONCOLOGY | Facility: HOSPITAL | Age: 78
Setting detail: RADIATION/ONCOLOGY SERIES
Discharge: HOME | End: 2018-03-05

## 2018-03-07 ENCOUNTER — OFFICE VISIT (OUTPATIENT)
Dept: ONCOLOGY | Facility: CLINIC | Age: 78
End: 2018-03-07

## 2018-03-07 VITALS
RESPIRATION RATE: 16 BRPM | TEMPERATURE: 97.2 F | BODY MASS INDEX: 34.99 KG/M2 | HEIGHT: 65 IN | DIASTOLIC BLOOD PRESSURE: 77 MMHG | WEIGHT: 210 LBS | HEART RATE: 83 BPM | SYSTOLIC BLOOD PRESSURE: 154 MMHG

## 2018-03-07 DIAGNOSIS — C34.12 CANCER OF UPPER LOBE OF LEFT LUNG (HCC): Primary | ICD-10-CM

## 2018-03-07 PROCEDURE — 99214 OFFICE O/P EST MOD 30 MIN: CPT | Performed by: INTERNAL MEDICINE

## 2018-03-07 NOTE — PROGRESS NOTES
DATE OF VISIT: 3/7/2018    REASON FOR VISIT: Followup for:  A. Stage IB, I5bD2F3, poorly differentiated adenocarcinoma of the lung.  B. Stage IB D3qH5V5  right upper lobe poorly differentiated adenocarcinoma  C. Left adrenal hypermetabolic nodule    HISTORY OF PRESENT ILLNESS: The patient is a very pleasant 76-year-old female with past medical history significant for poorly differentiated adenocarcinoma of the lung, [id number], tumor size 3.2 cm in the left upper lobe. The patient is status post left upper lobe resection with mediastinal lymph node sampling done by Dr. Jeremy Herrera on 08/26/2015. The patient is here today in scheduled  followup visit.    SUBJECTIVE: The patient is here today with her .  She is been doing fairly well.  She is scheduled to have her surgery next month with Dr. Guzmán.  Denied any weight loss no abdominal pain no back pain.     REVIEW OF SYSTEMS: All the other systems are reviewed by me and negative  except what is mentioned in HPI and subjectives.     PAST MEDICAL HISTORY/SOCIAL HISTORY/FAMILY HISTORY: Unchanged from my prior  documentation done on 08/13/2015.      Current Outpatient Prescriptions:   •  Ascorbic Acid (VITAMIN C PO), Take 1,000 mg by mouth Daily., Disp: , Rfl:   •  aspirin 81 MG tablet, Take 81 mg by mouth Daily., Disp: , Rfl:   •  Cholecalciferol (VITAMIN D3 PO), Take 5,000 Units by mouth Daily., Disp: , Rfl:   •  CRANBERRY PO, Take 1,000 mg by mouth Daily. 2, 500MG CAPSULES DAILY, Disp: , Rfl:   •  docusate sodium (COLACE) 100 MG capsule, Take 100 mg by mouth Daily., Disp: , Rfl:   •  ezetimibe (ZETIA) 10 MG tablet, Take 10 mg by mouth Every Night., Disp: , Rfl:   •  guaiFENesin (MUCINEX) 600 MG 12 hr tablet, Take 1,200 mg by mouth 2 (Two) Times a Day., Disp: , Rfl:   •  HYDROcod Polst-CPM Polst ER (TUSSIONEX PENNKINETIC ER) 10-8 MG/5ML ER suspension, Take 5 mL by mouth Every 12 (Twelve) Hours As Needed for Cough., Disp: 115 mL, Rfl: 0  •  loratadine (CLARITIN) 10  "MG tablet, Take 10 mg by mouth Daily., Disp: , Rfl:   •  megestrol (MEGACE) 40 MG/ML suspension, Take 20 mL by mouth Daily., Disp: 480 mL, Rfl: 2  •  mometasone-formoterol (DULERA 100) 100-5 MCG/ACT inhaler, Inhale 2 puffs Daily As Needed (SHORTNESS OF AIR)., Disp: , Rfl:   •  Multiple Vitamin tablet, Take 1 tablet by mouth Daily., Disp: , Rfl:   •  olmesartan-hydrochlorothiazide (BENICAR HCT) 40-12.5 MG per tablet, Take 1 tablet by mouth Daily., Disp: , Rfl:   •  simethicone (MYLICON) 125 MG chewable tablet, Chew 125 mg Every 6 (Six) Hours As Needed for Flatulence., Disp: , Rfl:   •  simvastatin (ZOCOR) 80 MG tablet, Take 80 mg by mouth Every Night., Disp: , Rfl:   •  Triamcinolone Acetonide (NASACORT AQ NA), 2 sprays into each nostril Daily., Disp: , Rfl:   No current facility-administered medications for this visit.     Facility-Administered Medications Ordered in Other Visits:   •  Chlorhexidine Gluconate Cloth 2 % pads 1 application, 1 application, Topical, Q12H PRN, BLANCA Rios    PHYSICAL EXAMINATION:   /77  Pulse 83  Temp 97.2 °F (36.2 °C) (Temporal Artery )   Resp 16  Ht 165.1 cm (65\")  Wt 95.3 kg (210 lb)  BMI 34.95 kg/m2  ECOG1  GENERAL: Age appropriate. No acute distress.   HEENT: Head atraumatic, normocephalic.   NECK: Supple. No JVD. No lymphadenopathy.   LUNGS: Clear to auscultation bilaterally. No wheezing. No rhonchi.   HEART: Regular rate and rhythm. S1, S2, no murmurs.   ABDOMEN: Soft, nontender, nondistended. Bowel sounds positive. No  hepatosplenomegaly.   EXTREMITIES: No clubbing, cyanosis, +1 bilateral pedal edema.   SKIN: No rashes. No purpura.   NEUROLOGIC: Awake and oriented x3. Strength 5 out of 5 in all muscle groups.     Hospital Outpatient Visit on 02/23/2018   Component Date Value Ref Range Status   • Glucose 02/23/2018 97  70 - 130 mg/dL Final      Nm Pet Skull Base To Mid Thigh    Result Date: 2/26/2018  Narrative: EXAMINATION: NM PET-CT SCAN, SKULL BASE TO MID " THIGHS - 02/23/2018  INDICATION:  C79.72-Secondary malignant neoplasm of left adrenal gland. Lung cancer.  TECHNIQUE: With fasting blood glucose level of 97 mg/dL, a total of 12.64 mCi of FDG was administered via the right hand. Following appropriate delay, PET-CT imaging is obtained from the skull vertex to the mid thighs bilaterally and diffuse multiplanar images are reconstructed. The CT scan is an unenhanced study and used for reference to the PET scan only and should not be considered a diagnostic CT scan. PET-CT imaging was reviewed reviewed in the axial, coronal, and sagittal planes as well as within the cine mode.  COMPARISON: 11/06/2017.  FINDINGS: Normal-variant activity is identified within the oropharynx or muscles of phonation. Normal-variant activity is  identified in the region of the vocal cords. There is no evidence of hypermetabolic activity identified to suggest evidence of metastatic disease within the neck. There are degenerative changes identified within the shoulders bilaterally as well as within the spine. There is a lymph node identified of the right paratracheal region which is unchanged when compared to the prior study and demonstrates no evidence of hypermetabolic activity. The nodule identified previously posteriorly within the right upper lobe is not  visualized on today's examination. There are atelectatic changes identified and chronic changes seen within the lung fields. No evidence of hypermetabolic activity to suggest evidence of metastatic disease. There is a hypermetabolic focus identified within the adrenal gland on the left with maximum SUV of 11.6. This hypermetabolic focus is increased both in size on CT scan as well as activity with maximum SUV previously measuring 5.1. There is atrophy identified of the left kidney which is unchanged. No additional hypermetabolic activity is seen within the abdomen or pelvis. Normal-variant activity is seen within the adrenal glands.  Degenerative change is seen within the spine and pelvis.      Impression: No visualized nodule seen posteriorly within the right upper lung field. There is however interval increase seen in size and activity within the left adrenal nodule.  DICTATED:     02/23/2018 EDITED    :     02/23/2018    This report was finalized on 2/26/2018 4:03 PM by Dr. Sabi Zabala MD.    (    ASSESSMENT: The patient is a very pleasant 76-year-old female with stage IB  adenocarcinoma of the lung.    PROBLEM LIST:  1. Stage IB poorly differentiated adenocarcinoma of the lung, [id number]:  A. Diagnosed 08/03/2015 after CT-guided biopsy.  B. Status post left upper lobe resection with mediastinal lymph node sampling  done by Dr. Herrera on 08/26/2015.  C. New right upper lobe lung nodule with left adrenal nodule, both were hypermetabolic active on PET scan done on November 6, 2017  2.  Stage IB right upper lobe adenocarcinoma [id number]:  A. presented with hypermetabolic nodule on a screening PET scan.  B.  Status post surgical resection with a clean margins done by Dr. Herrera November 30 2017  C.  Final pathology revealed 1.7 adenocarcinoma with pleural involvement negative hilar and mediastinal nodes and clear surgical margins  4.  Hypertension  5.  Hypercholesterolemia  6.  Heartburn    PLAN:  1.  The patient will follow-up with Dr. Guzmán as scheduled for left laparoscopic adrenalectomy.  2.  She will have repeat CBC and CMP prior to return.  3.  I will follow-up on the pathology report from her adrenal gland surgery.  4.  The patient will follow-up with me in 6 weeks.  5.  We'll continue hydrochlorothiazide with Benicar for hypertension  6.  Continue simvastatin for hypercholesterolemia  6.  We'll continue omeprazole 20 mg daily for heartburn  7.  The patient will have follow up CT scans May 26, 2018.  Luisana Washburn MD  3/7/2018

## 2018-04-03 ENCOUNTER — APPOINTMENT (OUTPATIENT)
Dept: PREADMISSION TESTING | Facility: HOSPITAL | Age: 78
End: 2018-04-03

## 2018-04-03 VITALS — HEIGHT: 65 IN | BODY MASS INDEX: 34.71 KG/M2 | WEIGHT: 208.34 LBS

## 2018-04-03 LAB
ABO GROUP BLD: NORMAL
ALBUMIN SERPL-MCNC: 4.1 G/DL (ref 3.2–4.8)
ALBUMIN/GLOB SERPL: 1.8 G/DL (ref 1.5–2.5)
ALP SERPL-CCNC: 68 U/L (ref 25–100)
ALT SERPL W P-5'-P-CCNC: 28 U/L (ref 7–40)
ANION GAP SERPL CALCULATED.3IONS-SCNC: 12 MMOL/L (ref 3–11)
APTT PPP: 26.2 SECONDS (ref 24–31)
ARTERIAL PATENCY WRIST A: ABNORMAL
AST SERPL-CCNC: 23 U/L (ref 0–33)
ATMOSPHERIC PRESS: ABNORMAL MMHG
BASE EXCESS BLDA CALC-SCNC: 2.5 MMOL/L (ref 0–2)
BDY SITE: ABNORMAL
BILIRUB SERPL-MCNC: 0.2 MG/DL (ref 0.3–1.2)
BLD GP AB SCN SERPL QL: NEGATIVE
BUN BLD-MCNC: 33 MG/DL (ref 9–23)
BUN/CREAT SERPL: 27.5 (ref 7–25)
CALCIUM SPEC-SCNC: 9.3 MG/DL (ref 8.7–10.4)
CHLORIDE SERPL-SCNC: 102 MMOL/L (ref 99–109)
CO2 BLDA-SCNC: 28.5 MMOL/L (ref 22–33)
CO2 SERPL-SCNC: 29 MMOL/L (ref 20–31)
COHGB MFR BLD: 1.2 % (ref 0–2)
CREAT BLD-MCNC: 1.2 MG/DL (ref 0.6–1.3)
DEPRECATED RDW RBC AUTO: 46.5 FL (ref 37–54)
ERYTHROCYTE [DISTWIDTH] IN BLOOD BY AUTOMATED COUNT: 13.9 % (ref 11.3–14.5)
GFR SERPL CREATININE-BSD FRML MDRD: 44 ML/MIN/1.73
GLOBULIN UR ELPH-MCNC: 2.3 GM/DL
GLUCOSE BLD-MCNC: 96 MG/DL (ref 70–100)
HBA1C MFR BLD: 5.9 % (ref 4.8–5.6)
HCO3 BLDA-SCNC: 27.3 MMOL/L (ref 20–26)
HCT VFR BLD AUTO: 36.8 % (ref 34.5–44)
HCT VFR BLD CALC: 35.6 %
HGB BLD-MCNC: 11.4 G/DL (ref 11.5–15.5)
HGB BLDA-MCNC: 11.6 G/DL (ref 14–18)
HOROWITZ INDEX BLD+IHG-RTO: 21 %
INR PPP: 0.93 (ref 0.91–1.09)
MCH RBC QN AUTO: 28.4 PG (ref 27–31)
MCHC RBC AUTO-ENTMCNC: 31 G/DL (ref 32–36)
MCV RBC AUTO: 91.8 FL (ref 80–99)
METHGB BLD QL: 1.1 % (ref 0–1.5)
MODALITY: ABNORMAL
OXYHGB MFR BLDV: 92.5 % (ref 94–99)
PCO2 BLDA: 41.9 MM HG (ref 35–45)
PH BLDA: 7.42 PH UNITS (ref 7.35–7.45)
PLATELET # BLD AUTO: 243 10*3/MM3 (ref 150–450)
PMV BLD AUTO: 10.2 FL (ref 6–12)
PO2 BLDA: 72.4 MM HG (ref 83–108)
POTASSIUM BLD-SCNC: 4.5 MMOL/L (ref 3.5–5.5)
PROT SERPL-MCNC: 6.4 G/DL (ref 5.7–8.2)
PROTHROMBIN TIME: 9.8 SECONDS (ref 9.6–11.5)
RBC # BLD AUTO: 4.01 10*6/MM3 (ref 3.89–5.14)
RH BLD: POSITIVE
SODIUM BLD-SCNC: 143 MMOL/L (ref 132–146)
T&S EXPIRATION DATE: NORMAL
WBC NRBC COR # BLD: 8.22 10*3/MM3 (ref 3.5–10.8)

## 2018-04-03 PROCEDURE — 80053 COMPREHEN METABOLIC PANEL: CPT | Performed by: SURGERY

## 2018-04-03 PROCEDURE — 82805 BLOOD GASES W/O2 SATURATION: CPT | Performed by: SURGERY

## 2018-04-03 PROCEDURE — 85730 THROMBOPLASTIN TIME PARTIAL: CPT | Performed by: SURGERY

## 2018-04-03 PROCEDURE — 86850 RBC ANTIBODY SCREEN: CPT | Performed by: SURGERY

## 2018-04-03 PROCEDURE — 86900 BLOOD TYPING SEROLOGIC ABO: CPT | Performed by: SURGERY

## 2018-04-03 PROCEDURE — 36415 COLL VENOUS BLD VENIPUNCTURE: CPT

## 2018-04-03 PROCEDURE — 85027 COMPLETE CBC AUTOMATED: CPT | Performed by: SURGERY

## 2018-04-03 PROCEDURE — 86901 BLOOD TYPING SEROLOGIC RH(D): CPT | Performed by: SURGERY

## 2018-04-03 PROCEDURE — 36600 WITHDRAWAL OF ARTERIAL BLOOD: CPT | Performed by: SURGERY

## 2018-04-03 PROCEDURE — 83036 HEMOGLOBIN GLYCOSYLATED A1C: CPT | Performed by: SURGERY

## 2018-04-03 PROCEDURE — 85610 PROTHROMBIN TIME: CPT | Performed by: SURGERY

## 2018-04-03 PROCEDURE — 86923 COMPATIBILITY TEST ELECTRIC: CPT

## 2018-04-03 RX ORDER — MONTELUKAST SODIUM 10 MG/1
10 TABLET ORAL DAILY PRN
COMMUNITY

## 2018-04-06 ENCOUNTER — ANESTHESIA EVENT (OUTPATIENT)
Dept: PERIOP | Facility: HOSPITAL | Age: 78
End: 2018-04-06

## 2018-04-06 ENCOUNTER — HOSPITAL ENCOUNTER (INPATIENT)
Facility: HOSPITAL | Age: 78
LOS: 2 days | Discharge: HOME | End: 2018-04-08
Attending: SURGERY | Admitting: SURGERY

## 2018-04-06 ENCOUNTER — ANESTHESIA (OUTPATIENT)
Dept: PERIOP | Facility: HOSPITAL | Age: 78
End: 2018-04-06

## 2018-04-06 DIAGNOSIS — E27.8 LEFT ADRENAL MASS (HCC): ICD-10-CM

## 2018-04-06 PROCEDURE — 88307 TISSUE EXAM BY PATHOLOGIST: CPT | Performed by: SURGERY

## 2018-04-06 PROCEDURE — 94799 UNLISTED PULMONARY SVC/PX: CPT

## 2018-04-06 PROCEDURE — 25010000002 ONDANSETRON PER 1 MG: Performed by: NURSE ANESTHETIST, CERTIFIED REGISTERED

## 2018-04-06 PROCEDURE — 25010000002 HYDROMORPHONE PER 4 MG: Performed by: NURSE ANESTHETIST, CERTIFIED REGISTERED

## 2018-04-06 PROCEDURE — 94760 N-INVAS EAR/PLS OXIMETRY 1: CPT

## 2018-04-06 PROCEDURE — 25010000002 DEXAMETHASONE PER 1 MG: Performed by: NURSE ANESTHETIST, CERTIFIED REGISTERED

## 2018-04-06 PROCEDURE — 94640 AIRWAY INHALATION TREATMENT: CPT

## 2018-04-06 PROCEDURE — 25010000003 CEFAZOLIN IN DEXTROSE 2-4 GM/100ML-% SOLUTION: Performed by: SURGERY

## 2018-04-06 PROCEDURE — 0GT24ZZ RESECTION OF LEFT ADRENAL GLAND, PERCUTANEOUS ENDOSCOPIC APPROACH: ICD-10-PCS | Performed by: SURGERY

## 2018-04-06 PROCEDURE — 25010000002 FENTANYL CITRATE (PF) 100 MCG/2ML SOLUTION: Performed by: NURSE ANESTHETIST, CERTIFIED REGISTERED

## 2018-04-06 PROCEDURE — 25010000002 NEOSTIGMINE 10 MG/10ML SOLUTION: Performed by: NURSE ANESTHETIST, CERTIFIED REGISTERED

## 2018-04-06 PROCEDURE — 25010000002 PROPOFOL 10 MG/ML EMULSION: Performed by: NURSE ANESTHETIST, CERTIFIED REGISTERED

## 2018-04-06 RX ORDER — DOCUSATE SODIUM 100 MG/1
100 CAPSULE, LIQUID FILLED ORAL DAILY
Status: DISCONTINUED | OUTPATIENT
Start: 2018-04-06 | End: 2018-04-08 | Stop reason: HOSPADM

## 2018-04-06 RX ORDER — PROMETHAZINE HYDROCHLORIDE 25 MG/1
25 TABLET ORAL ONCE AS NEEDED
Status: DISCONTINUED | OUTPATIENT
Start: 2018-04-06 | End: 2018-04-06 | Stop reason: HOSPADM

## 2018-04-06 RX ORDER — GLYCOPYRROLATE 0.2 MG/ML
INJECTION INTRAMUSCULAR; INTRAVENOUS AS NEEDED
Status: DISCONTINUED | OUTPATIENT
Start: 2018-04-06 | End: 2018-04-06 | Stop reason: SURG

## 2018-04-06 RX ORDER — HEPARIN SODIUM 5000 [USP'U]/ML
5000 INJECTION, SOLUTION INTRAVENOUS; SUBCUTANEOUS EVERY 8 HOURS SCHEDULED
Status: DISCONTINUED | OUTPATIENT
Start: 2018-04-07 | End: 2018-04-08 | Stop reason: HOSPADM

## 2018-04-06 RX ORDER — SODIUM CHLORIDE 9 MG/ML
INJECTION, SOLUTION INTRAVENOUS AS NEEDED
Status: DISCONTINUED | OUTPATIENT
Start: 2018-04-06 | End: 2018-04-06 | Stop reason: HOSPADM

## 2018-04-06 RX ORDER — SODIUM CHLORIDE, SODIUM LACTATE, POTASSIUM CHLORIDE, CALCIUM CHLORIDE 600; 310; 30; 20 MG/100ML; MG/100ML; MG/100ML; MG/100ML
125 INJECTION, SOLUTION INTRAVENOUS CONTINUOUS
Status: DISCONTINUED | OUTPATIENT
Start: 2018-04-06 | End: 2018-04-07

## 2018-04-06 RX ORDER — NEOSTIGMINE METHYLSULFATE 1 MG/ML
INJECTION, SOLUTION INTRAVENOUS AS NEEDED
Status: DISCONTINUED | OUTPATIENT
Start: 2018-04-06 | End: 2018-04-06 | Stop reason: SURG

## 2018-04-06 RX ORDER — HYDROMORPHONE HYDROCHLORIDE 1 MG/ML
0.5 INJECTION, SOLUTION INTRAMUSCULAR; INTRAVENOUS; SUBCUTANEOUS
Status: DISCONTINUED | OUTPATIENT
Start: 2018-04-06 | End: 2018-04-06 | Stop reason: HOSPADM

## 2018-04-06 RX ORDER — SODIUM CHLORIDE, SODIUM LACTATE, POTASSIUM CHLORIDE, CALCIUM CHLORIDE 600; 310; 30; 20 MG/100ML; MG/100ML; MG/100ML; MG/100ML
9 INJECTION, SOLUTION INTRAVENOUS CONTINUOUS
Status: DISCONTINUED | OUTPATIENT
Start: 2018-04-06 | End: 2018-04-08 | Stop reason: HOSPADM

## 2018-04-06 RX ORDER — LIDOCAINE HYDROCHLORIDE 40 MG/ML
SOLUTION TOPICAL AS NEEDED
Status: DISCONTINUED | OUTPATIENT
Start: 2018-04-06 | End: 2018-04-06 | Stop reason: SURG

## 2018-04-06 RX ORDER — ONDANSETRON 2 MG/ML
INJECTION INTRAMUSCULAR; INTRAVENOUS AS NEEDED
Status: DISCONTINUED | OUTPATIENT
Start: 2018-04-06 | End: 2018-04-06 | Stop reason: SURG

## 2018-04-06 RX ORDER — SIMETHICONE 125 MG
125 TABLET,CHEWABLE ORAL EVERY 6 HOURS PRN
Status: DISCONTINUED | OUTPATIENT
Start: 2018-04-06 | End: 2018-04-08 | Stop reason: HOSPADM

## 2018-04-06 RX ORDER — FAMOTIDINE 20 MG/1
20 TABLET, FILM COATED ORAL ONCE
Status: COMPLETED | OUTPATIENT
Start: 2018-04-06 | End: 2018-04-06

## 2018-04-06 RX ORDER — PANTOPRAZOLE SODIUM 40 MG/10ML
40 INJECTION, POWDER, LYOPHILIZED, FOR SOLUTION INTRAVENOUS ONCE
Status: DISCONTINUED | OUTPATIENT
Start: 2018-04-06 | End: 2018-04-06

## 2018-04-06 RX ORDER — ONDANSETRON 2 MG/ML
4 INJECTION INTRAMUSCULAR; INTRAVENOUS EVERY 6 HOURS PRN
Status: DISCONTINUED | OUTPATIENT
Start: 2018-04-06 | End: 2018-04-08 | Stop reason: HOSPADM

## 2018-04-06 RX ORDER — PROMETHAZINE HYDROCHLORIDE 25 MG/1
25 SUPPOSITORY RECTAL ONCE AS NEEDED
Status: DISCONTINUED | OUTPATIENT
Start: 2018-04-06 | End: 2018-04-06 | Stop reason: HOSPADM

## 2018-04-06 RX ORDER — PROMETHAZINE HYDROCHLORIDE 25 MG/ML
6.25 INJECTION, SOLUTION INTRAMUSCULAR; INTRAVENOUS ONCE AS NEEDED
Status: DISCONTINUED | OUTPATIENT
Start: 2018-04-06 | End: 2018-04-06 | Stop reason: HOSPADM

## 2018-04-06 RX ORDER — BUDESONIDE AND FORMOTEROL FUMARATE DIHYDRATE 160; 4.5 UG/1; UG/1
2 AEROSOL RESPIRATORY (INHALATION)
Status: DISCONTINUED | OUTPATIENT
Start: 2018-04-06 | End: 2018-04-08 | Stop reason: HOSPADM

## 2018-04-06 RX ORDER — ONDANSETRON 2 MG/ML
4 INJECTION INTRAMUSCULAR; INTRAVENOUS ONCE AS NEEDED
Status: COMPLETED | OUTPATIENT
Start: 2018-04-06 | End: 2018-04-06

## 2018-04-06 RX ORDER — ATRACURIUM BESYLATE 10 MG/ML
INJECTION, SOLUTION INTRAVENOUS AS NEEDED
Status: DISCONTINUED | OUTPATIENT
Start: 2018-04-06 | End: 2018-04-06 | Stop reason: SURG

## 2018-04-06 RX ORDER — LABETALOL HYDROCHLORIDE 5 MG/ML
INJECTION, SOLUTION INTRAVENOUS AS NEEDED
Status: DISCONTINUED | OUTPATIENT
Start: 2018-04-06 | End: 2018-04-06 | Stop reason: SURG

## 2018-04-06 RX ORDER — OXYCODONE HYDROCHLORIDE AND ACETAMINOPHEN 5; 325 MG/1; MG/1
1 TABLET ORAL ONCE AS NEEDED
Status: DISCONTINUED | OUTPATIENT
Start: 2018-04-06 | End: 2018-04-06 | Stop reason: HOSPADM

## 2018-04-06 RX ORDER — FENTANYL CITRATE 50 UG/ML
INJECTION, SOLUTION INTRAMUSCULAR; INTRAVENOUS AS NEEDED
Status: DISCONTINUED | OUTPATIENT
Start: 2018-04-06 | End: 2018-04-06 | Stop reason: SURG

## 2018-04-06 RX ORDER — PANTOPRAZOLE SODIUM 40 MG/10ML
40 INJECTION, POWDER, LYOPHILIZED, FOR SOLUTION INTRAVENOUS
Status: DISCONTINUED | OUTPATIENT
Start: 2018-04-06 | End: 2018-04-06

## 2018-04-06 RX ORDER — SODIUM CHLORIDE 0.9 % (FLUSH) 0.9 %
1-10 SYRINGE (ML) INJECTION AS NEEDED
Status: DISCONTINUED | OUTPATIENT
Start: 2018-04-06 | End: 2018-04-06 | Stop reason: HOSPADM

## 2018-04-06 RX ORDER — METOPROLOL TARTRATE 5 MG/5ML
5 INJECTION INTRAVENOUS EVERY 6 HOURS PRN
Status: DISCONTINUED | OUTPATIENT
Start: 2018-04-06 | End: 2018-04-07

## 2018-04-06 RX ORDER — LABETALOL HYDROCHLORIDE 5 MG/ML
5 INJECTION, SOLUTION INTRAVENOUS
Status: DISCONTINUED | OUTPATIENT
Start: 2018-04-06 | End: 2018-04-06 | Stop reason: HOSPADM

## 2018-04-06 RX ORDER — ACETAMINOPHEN 500 MG
500 TABLET ORAL EVERY 6 HOURS SCHEDULED
Status: DISCONTINUED | OUTPATIENT
Start: 2018-04-06 | End: 2018-04-08 | Stop reason: HOSPADM

## 2018-04-06 RX ORDER — HYDRALAZINE HYDROCHLORIDE 20 MG/ML
5 INJECTION INTRAMUSCULAR; INTRAVENOUS
Status: DISCONTINUED | OUTPATIENT
Start: 2018-04-06 | End: 2018-04-06 | Stop reason: HOSPADM

## 2018-04-06 RX ORDER — FENTANYL CITRATE 50 UG/ML
50 INJECTION, SOLUTION INTRAMUSCULAR; INTRAVENOUS
Status: DISCONTINUED | OUTPATIENT
Start: 2018-04-06 | End: 2018-04-06 | Stop reason: HOSPADM

## 2018-04-06 RX ORDER — PROPOFOL 10 MG/ML
VIAL (ML) INTRAVENOUS AS NEEDED
Status: DISCONTINUED | OUTPATIENT
Start: 2018-04-06 | End: 2018-04-06 | Stop reason: SURG

## 2018-04-06 RX ORDER — CEFAZOLIN SODIUM 2 G/100ML
2 INJECTION, SOLUTION INTRAVENOUS ONCE
Status: COMPLETED | OUTPATIENT
Start: 2018-04-06 | End: 2018-04-06

## 2018-04-06 RX ORDER — BUPIVACAINE HYDROCHLORIDE 2.5 MG/ML
INJECTION, SOLUTION EPIDURAL; INFILTRATION; INTRACAUDAL AS NEEDED
Status: DISCONTINUED | OUTPATIENT
Start: 2018-04-06 | End: 2018-04-06 | Stop reason: HOSPADM

## 2018-04-06 RX ORDER — LIDOCAINE HYDROCHLORIDE 10 MG/ML
0.5 INJECTION, SOLUTION EPIDURAL; INFILTRATION; INTRACAUDAL; PERINEURAL ONCE AS NEEDED
Status: COMPLETED | OUTPATIENT
Start: 2018-04-06 | End: 2018-04-06

## 2018-04-06 RX ORDER — LIDOCAINE HYDROCHLORIDE 10 MG/ML
INJECTION, SOLUTION EPIDURAL; INFILTRATION; INTRACAUDAL; PERINEURAL AS NEEDED
Status: DISCONTINUED | OUTPATIENT
Start: 2018-04-06 | End: 2018-04-06 | Stop reason: SURG

## 2018-04-06 RX ORDER — DEXAMETHASONE SODIUM PHOSPHATE 10 MG/ML
INJECTION INTRAMUSCULAR; INTRAVENOUS AS NEEDED
Status: DISCONTINUED | OUTPATIENT
Start: 2018-04-06 | End: 2018-04-06 | Stop reason: SURG

## 2018-04-06 RX ORDER — ONDANSETRON 4 MG/1
4 TABLET, FILM COATED ORAL EVERY 6 HOURS PRN
Status: DISCONTINUED | OUTPATIENT
Start: 2018-04-06 | End: 2018-04-08 | Stop reason: HOSPADM

## 2018-04-06 RX ORDER — TRAMADOL HYDROCHLORIDE 50 MG/1
50 TABLET ORAL EVERY 6 HOURS PRN
Status: DISCONTINUED | OUTPATIENT
Start: 2018-04-06 | End: 2018-04-08 | Stop reason: HOSPADM

## 2018-04-06 RX ADMIN — FENTANYL CITRATE 50 MCG: 50 INJECTION, SOLUTION INTRAMUSCULAR; INTRAVENOUS at 14:13

## 2018-04-06 RX ADMIN — FENTANYL CITRATE 100 MCG: 50 INJECTION, SOLUTION INTRAMUSCULAR; INTRAVENOUS at 11:59

## 2018-04-06 RX ADMIN — FENTANYL CITRATE 50 MCG: 50 INJECTION, SOLUTION INTRAMUSCULAR; INTRAVENOUS at 13:52

## 2018-04-06 RX ADMIN — DOCUSATE SODIUM 100 MG: 100 CAPSULE, LIQUID FILLED ORAL at 17:48

## 2018-04-06 RX ADMIN — HYDROMORPHONE HYDROCHLORIDE 0.5 MG: 10 INJECTION INTRAMUSCULAR; INTRAVENOUS; SUBCUTANEOUS at 14:34

## 2018-04-06 RX ADMIN — ONDANSETRON 4 MG: 2 INJECTION INTRAMUSCULAR; INTRAVENOUS at 14:20

## 2018-04-06 RX ADMIN — DEXAMETHASONE SODIUM PHOSPHATE 8 MG: 10 INJECTION INTRAMUSCULAR; INTRAVENOUS at 12:23

## 2018-04-06 RX ADMIN — SODIUM CHLORIDE, POTASSIUM CHLORIDE, SODIUM LACTATE AND CALCIUM CHLORIDE 125 ML/HR: 600; 310; 30; 20 INJECTION, SOLUTION INTRAVENOUS at 23:19

## 2018-04-06 RX ADMIN — LIDOCAINE HYDROCHLORIDE 40 MG: 10 INJECTION, SOLUTION EPIDURAL; INFILTRATION; INTRACAUDAL; PERINEURAL at 11:59

## 2018-04-06 RX ADMIN — ACETAMINOPHEN 500 MG: 500 TABLET, FILM COATED ORAL at 17:48

## 2018-04-06 RX ADMIN — LABETALOL HYDROCHLORIDE 5 MG: 5 INJECTION, SOLUTION INTRAVENOUS at 12:48

## 2018-04-06 RX ADMIN — SODIUM CHLORIDE, POTASSIUM CHLORIDE, SODIUM LACTATE AND CALCIUM CHLORIDE: 600; 310; 30; 20 INJECTION, SOLUTION INTRAVENOUS at 11:54

## 2018-04-06 RX ADMIN — FENTANYL CITRATE 50 MCG: 50 INJECTION, SOLUTION INTRAMUSCULAR; INTRAVENOUS at 12:29

## 2018-04-06 RX ADMIN — BUDESONIDE AND FORMOTEROL FUMARATE DIHYDRATE 2 PUFF: 160; 4.5 AEROSOL RESPIRATORY (INHALATION) at 19:41

## 2018-04-06 RX ADMIN — ONDANSETRON 4 MG: 2 INJECTION INTRAMUSCULAR; INTRAVENOUS at 13:53

## 2018-04-06 RX ADMIN — LABETALOL HYDROCHLORIDE 5 MG: 5 INJECTION, SOLUTION INTRAVENOUS at 13:35

## 2018-04-06 RX ADMIN — SODIUM CHLORIDE, POTASSIUM CHLORIDE, SODIUM LACTATE AND CALCIUM CHLORIDE 9 ML/HR: 600; 310; 30; 20 INJECTION, SOLUTION INTRAVENOUS at 08:25

## 2018-04-06 RX ADMIN — ACETAMINOPHEN 500 MG: 500 TABLET, FILM COATED ORAL at 23:22

## 2018-04-06 RX ADMIN — NEOSTIGMINE METHYLSULFATE 3 MG: 1 INJECTION, SOLUTION INTRAVENOUS at 13:44

## 2018-04-06 RX ADMIN — FENTANYL CITRATE 50 MCG: 50 INJECTION, SOLUTION INTRAMUSCULAR; INTRAVENOUS at 14:53

## 2018-04-06 RX ADMIN — METOROPROLOL TARTRATE 5 MG: 5 INJECTION, SOLUTION INTRAVENOUS at 17:48

## 2018-04-06 RX ADMIN — CEFAZOLIN SODIUM 2 G: 2 INJECTION, SOLUTION INTRAVENOUS at 11:54

## 2018-04-06 RX ADMIN — FENTANYL CITRATE 50 MCG: 50 INJECTION, SOLUTION INTRAMUSCULAR; INTRAVENOUS at 15:06

## 2018-04-06 RX ADMIN — EPHEDRINE SULFATE 10 MG: 50 INJECTION INTRAMUSCULAR; INTRAVENOUS; SUBCUTANEOUS at 12:18

## 2018-04-06 RX ADMIN — PROPOFOL 30 MG: 10 INJECTION, EMULSION INTRAVENOUS at 13:38

## 2018-04-06 RX ADMIN — ATRACURIUM BESYLATE 50 MG: 10 INJECTION, SOLUTION INTRAVENOUS at 11:59

## 2018-04-06 RX ADMIN — FAMOTIDINE 20 MG: 20 TABLET, FILM COATED ORAL at 08:48

## 2018-04-06 RX ADMIN — PROPOFOL 100 MG: 10 INJECTION, EMULSION INTRAVENOUS at 11:59

## 2018-04-06 RX ADMIN — LIDOCAINE HYDROCHLORIDE 0.2 ML: 10 INJECTION, SOLUTION EPIDURAL; INFILTRATION; INTRACAUDAL; PERINEURAL at 09:18

## 2018-04-06 RX ADMIN — LIDOCAINE HYDROCHLORIDE 1 EACH: 40 SOLUTION TOPICAL at 12:02

## 2018-04-06 RX ADMIN — SODIUM CHLORIDE, POTASSIUM CHLORIDE, SODIUM LACTATE AND CALCIUM CHLORIDE 125 ML/HR: 600; 310; 30; 20 INJECTION, SOLUTION INTRAVENOUS at 15:13

## 2018-04-06 RX ADMIN — SODIUM CHLORIDE, POTASSIUM CHLORIDE, SODIUM LACTATE AND CALCIUM CHLORIDE 9 ML/HR: 600; 310; 30; 20 INJECTION, SOLUTION INTRAVENOUS at 09:18

## 2018-04-06 RX ADMIN — LIDOCAINE HYDROCHLORIDE 0.2 ML: 10 INJECTION, SOLUTION EPIDURAL; INFILTRATION; INTRACAUDAL; PERINEURAL at 08:25

## 2018-04-06 RX ADMIN — FENTANYL CITRATE 50 MCG: 50 INJECTION, SOLUTION INTRAMUSCULAR; INTRAVENOUS at 14:22

## 2018-04-06 RX ADMIN — SODIUM CHLORIDE, POTASSIUM CHLORIDE, SODIUM LACTATE AND CALCIUM CHLORIDE 125 ML/HR: 600; 310; 30; 20 INJECTION, SOLUTION INTRAVENOUS at 15:32

## 2018-04-06 RX ADMIN — GLYCOPYRROLATE 0.4 MG: 0.2 INJECTION, SOLUTION INTRAMUSCULAR; INTRAVENOUS at 13:44

## 2018-04-06 NOTE — ANESTHESIA PROCEDURE NOTES
Airway  Urgency: elective    Airway not difficult    General Information and Staff    Patient location during procedure: OR  CRNA: JOHANNA JASON F    Indications and Patient Condition  Indications for airway management: airway protection    Preoxygenated: yes  MILS not maintained throughout  Mask difficulty assessment: 2 - vent by mask + OA or adjuvant +/- NMBA    Final Airway Details  Final airway type: endotracheal airway      Successful airway: ETT  Cuffed: yes   Successful intubation technique: direct laryngoscopy  Endotracheal tube insertion site: oral  Blade: Puneet  Blade size: #3  ETT size: 7.0 mm  Cormack-Lehane Classification: grade I - full view of glottis  Placement verified by: chest auscultation and capnometry   Cuff volume (mL): 6  Measured from: lips  ETT to lips (cm): 20  Number of attempts at approach: 1    Additional Comments  Negative epigastric sounds, Breath sound equal bilaterally with symmetric chest rise and fall

## 2018-04-06 NOTE — ANESTHESIA PREPROCEDURE EVALUATION
Anesthesia Evaluation     Patient summary reviewed and Nursing notes reviewed   no history of anesthetic complications:  NPO Solid Status: > 8 hours  NPO Liquid Status: > 8 hours           Airway   Mallampati: II  TM distance: >3 FB  Neck ROM: full  No difficulty expected  Dental    (+) upper dentures and partials    Pulmonary - normal exam    breath sounds clear to auscultation  (+) a smoker Former, lung cancer,   (-) shortness of breath, sleep apnea    ROS comment: Home oxygen at night 2 Liters  Cardiovascular - normal exam  Exercise tolerance: good (4-7 METS)    ECG reviewed  Rhythm: regular  Rate: normal    (+) hypertension well controlled, DRAPER, hyperlipidemia,   (-) dysrhythmias, angina, cardiac stents      Neuro/Psych- negative ROS  GI/Hepatic/Renal/Endo    (+) obesity,  GERD well controlled,    (-) hepatitis, liver disease, no renal disease, diabetes, hypothyroidism    Musculoskeletal     (+) arthralgias,   Abdominal    Substance History      OB/GYN          Other   (+) arthritis   history of cancer (lung cancer s/p surgery)                                  Anesthesia Plan    ASA 3     general   (Labs/studies reviewed  Risks and benefits discussed)  intravenous induction   Use of blood products discussed with patient  Consented to blood products.   Plan discussed with CRNA.

## 2018-04-06 NOTE — ANESTHESIA POSTPROCEDURE EVALUATION
Patient: America Nix    Procedure Summary     Date:  04/06/18 Room / Location:   [facility] / [facility]    Anesthesia Start:  1154 Anesthesia Stop:      Procedure:  ADRENALECTOMY LAPAROSCOPIC LEFT, (Left Flank) Diagnosis:      Surgeon:  Carol Ann Guzmán MD Provider:  Angel Esquivel MD    Anesthesia Type:  general ASA Status:  3          Anesthesia Type: general  Last vitals  BP   147/86 (04/06/18 1409)   Temp   97 °F (36.1 °C) (04/06/18 1409)   Pulse   63 (04/06/18 1409)   Resp   16 (04/06/18 1409)     SpO2   96 % (04/06/18 1409)     Post Anesthesia Care and Evaluation    Patient location during evaluation: PACU  Level of consciousness: awake and alert  Pain score: 3  Pain management: adequate  Airway patency: patent  Anesthetic complications: No anesthetic complications  PONV Status: none  Cardiovascular status: acceptable  Respiratory status: acceptable and nasal cannula  Hydration status: acceptable    Comments: maximus proc well, to pacu awake/ ao x 3, vss velarde following commands, report given.

## 2018-04-07 LAB
ABO + RH BLD: NORMAL
ABO + RH BLD: NORMAL
ANION GAP SERPL CALCULATED.3IONS-SCNC: 5 MMOL/L (ref 3–11)
BASOPHILS # BLD AUTO: 0.01 10*3/MM3 (ref 0–0.2)
BASOPHILS NFR BLD AUTO: 0.1 % (ref 0–1)
BH BB BLOOD EXPIRATION DATE: NORMAL
BH BB BLOOD EXPIRATION DATE: NORMAL
BH BB BLOOD TYPE BARCODE: 6200
BH BB BLOOD TYPE BARCODE: 6200
BH BB DISPENSE STATUS: NORMAL
BH BB DISPENSE STATUS: NORMAL
BH BB PRODUCT CODE: NORMAL
BH BB PRODUCT CODE: NORMAL
BH BB UNIT NUMBER: NORMAL
BH BB UNIT NUMBER: NORMAL
BUN BLD-MCNC: 20 MG/DL (ref 9–23)
BUN/CREAT SERPL: 20 (ref 7–25)
CALCIUM SPEC-SCNC: 8.8 MG/DL (ref 8.7–10.4)
CHLORIDE SERPL-SCNC: 107 MMOL/L (ref 99–109)
CO2 SERPL-SCNC: 28 MMOL/L (ref 20–31)
CREAT BLD-MCNC: 1 MG/DL (ref 0.6–1.3)
DEPRECATED RDW RBC AUTO: 46.6 FL (ref 37–54)
EOSINOPHIL # BLD AUTO: 0.01 10*3/MM3 (ref 0–0.3)
EOSINOPHIL NFR BLD AUTO: 0.1 % (ref 0–3)
ERYTHROCYTE [DISTWIDTH] IN BLOOD BY AUTOMATED COUNT: 13.9 % (ref 11.3–14.5)
GFR SERPL CREATININE-BSD FRML MDRD: 54 ML/MIN/1.73
GLUCOSE BLD-MCNC: 100 MG/DL (ref 70–100)
HCT VFR BLD AUTO: 30.5 % (ref 34.5–44)
HGB BLD-MCNC: 9.6 G/DL (ref 11.5–15.5)
IMM GRANULOCYTES # BLD: 0.02 10*3/MM3 (ref 0–0.03)
IMM GRANULOCYTES NFR BLD: 0.2 % (ref 0–0.6)
LYMPHOCYTES # BLD AUTO: 1.22 10*3/MM3 (ref 0.6–4.8)
LYMPHOCYTES NFR BLD AUTO: 11.4 % (ref 24–44)
MCH RBC QN AUTO: 28.7 PG (ref 27–31)
MCHC RBC AUTO-ENTMCNC: 31.5 G/DL (ref 32–36)
MCV RBC AUTO: 91.3 FL (ref 80–99)
MONOCYTES # BLD AUTO: 1.13 10*3/MM3 (ref 0–1)
MONOCYTES NFR BLD AUTO: 10.6 % (ref 0–12)
NEUTROPHILS # BLD AUTO: 8.28 10*3/MM3 (ref 1.5–8.3)
NEUTROPHILS NFR BLD AUTO: 77.6 % (ref 41–71)
PLATELET # BLD AUTO: 230 10*3/MM3 (ref 150–450)
PMV BLD AUTO: 10.5 FL (ref 6–12)
POTASSIUM BLD-SCNC: 4.2 MMOL/L (ref 3.5–5.5)
RBC # BLD AUTO: 3.34 10*6/MM3 (ref 3.89–5.14)
SODIUM BLD-SCNC: 140 MMOL/L (ref 132–146)
UNIT  ABO: NORMAL
UNIT  ABO: NORMAL
UNIT  RH: NORMAL
UNIT  RH: NORMAL
WBC NRBC COR # BLD: 10.67 10*3/MM3 (ref 3.5–10.8)

## 2018-04-07 PROCEDURE — 94799 UNLISTED PULMONARY SVC/PX: CPT

## 2018-04-07 PROCEDURE — 85025 COMPLETE CBC W/AUTO DIFF WBC: CPT | Performed by: SURGERY

## 2018-04-07 PROCEDURE — 94640 AIRWAY INHALATION TREATMENT: CPT

## 2018-04-07 PROCEDURE — 25010000002 HEPARIN (PORCINE) PER 1000 UNITS: Performed by: SURGERY

## 2018-04-07 PROCEDURE — 80048 BASIC METABOLIC PNL TOTAL CA: CPT | Performed by: SURGERY

## 2018-04-07 RX ORDER — OLMESARTAN MEDOXOMIL 20 MG/1
40 TABLET ORAL
Status: DISCONTINUED | OUTPATIENT
Start: 2018-04-07 | End: 2018-04-08 | Stop reason: HOSPADM

## 2018-04-07 RX ADMIN — OLMESARTAN MEDOXOMIL 40 MG: 20 TABLET, FILM COATED ORAL at 09:01

## 2018-04-07 RX ADMIN — BUDESONIDE AND FORMOTEROL FUMARATE DIHYDRATE 2 PUFF: 160; 4.5 AEROSOL RESPIRATORY (INHALATION) at 09:08

## 2018-04-07 RX ADMIN — DOCUSATE SODIUM 100 MG: 100 CAPSULE, LIQUID FILLED ORAL at 09:01

## 2018-04-07 RX ADMIN — HEPARIN SODIUM 5000 UNITS: 5000 INJECTION, SOLUTION INTRAVENOUS; SUBCUTANEOUS at 16:00

## 2018-04-07 RX ADMIN — HEPARIN SODIUM 5000 UNITS: 5000 INJECTION, SOLUTION INTRAVENOUS; SUBCUTANEOUS at 21:37

## 2018-04-07 RX ADMIN — ACETAMINOPHEN 500 MG: 500 TABLET, FILM COATED ORAL at 06:01

## 2018-04-07 RX ADMIN — HEPARIN SODIUM 5000 UNITS: 5000 INJECTION, SOLUTION INTRAVENOUS; SUBCUTANEOUS at 06:01

## 2018-04-07 RX ADMIN — ACETAMINOPHEN 500 MG: 500 TABLET, FILM COATED ORAL at 17:44

## 2018-04-07 RX ADMIN — BUDESONIDE AND FORMOTEROL FUMARATE DIHYDRATE 2 PUFF: 160; 4.5 AEROSOL RESPIRATORY (INHALATION) at 20:29

## 2018-04-07 RX ADMIN — ACETAMINOPHEN 500 MG: 500 TABLET, FILM COATED ORAL at 12:55

## 2018-04-07 RX ADMIN — TRAMADOL HYDROCHLORIDE 50 MG: 50 TABLET, COATED ORAL at 12:55

## 2018-04-08 VITALS
OXYGEN SATURATION: 95 % | TEMPERATURE: 97.6 F | HEART RATE: 79 BPM | DIASTOLIC BLOOD PRESSURE: 63 MMHG | SYSTOLIC BLOOD PRESSURE: 113 MMHG | WEIGHT: 208 LBS | RESPIRATION RATE: 18 BRPM | HEIGHT: 65 IN | BODY MASS INDEX: 34.66 KG/M2

## 2018-04-08 PROBLEM — E27.8 LEFT ADRENAL MASS: Status: RESOLVED | Noted: 2018-04-06 | Resolved: 2018-04-08

## 2018-04-08 PROBLEM — R91.1 LUNG NODULE: Status: RESOLVED | Noted: 2017-11-21 | Resolved: 2018-04-08

## 2018-04-08 PROBLEM — Z90.2 S/P LOBECTOMY OF LUNG: Status: RESOLVED | Noted: 2017-11-30 | Resolved: 2018-04-08

## 2018-04-08 PROCEDURE — 25010000002 HEPARIN (PORCINE) PER 1000 UNITS: Performed by: SURGERY

## 2018-04-08 RX ADMIN — OLMESARTAN MEDOXOMIL 40 MG: 20 TABLET, FILM COATED ORAL at 09:00

## 2018-04-08 RX ADMIN — ACETAMINOPHEN 500 MG: 500 TABLET, FILM COATED ORAL at 00:46

## 2018-04-08 RX ADMIN — DOCUSATE SODIUM 100 MG: 100 CAPSULE, LIQUID FILLED ORAL at 09:00

## 2018-04-08 RX ADMIN — ACETAMINOPHEN 500 MG: 500 TABLET, FILM COATED ORAL at 06:40

## 2018-04-08 RX ADMIN — HEPARIN SODIUM 5000 UNITS: 5000 INJECTION, SOLUTION INTRAVENOUS; SUBCUTANEOUS at 06:40

## 2018-04-09 LAB
CYTO UR: NORMAL
LAB AP CASE REPORT: NORMAL
LAB AP CLINICAL INFORMATION: NORMAL
LAB AP DIAGNOSIS COMMENT: NORMAL
Lab: NORMAL
PATH REPORT.FINAL DX SPEC: NORMAL
PATH REPORT.GROSS SPEC: NORMAL

## 2018-04-11 DIAGNOSIS — C34.91 MALIGNANT NEOPLASM OF RIGHT LUNG, UNSPECIFIED PART OF LUNG (HCC): Primary | ICD-10-CM

## 2018-04-17 ENCOUNTER — OFFICE VISIT (OUTPATIENT)
Dept: CARDIAC SURGERY | Facility: CLINIC | Age: 78
End: 2018-04-17

## 2018-04-17 VITALS
DIASTOLIC BLOOD PRESSURE: 80 MMHG | WEIGHT: 208 LBS | SYSTOLIC BLOOD PRESSURE: 130 MMHG | BODY MASS INDEX: 34.66 KG/M2 | TEMPERATURE: 98 F | OXYGEN SATURATION: 92 % | HEIGHT: 65 IN | HEART RATE: 88 BPM

## 2018-04-17 DIAGNOSIS — C34.90 PRIMARY MALIGNANT NEOPLASM OF LUNG METASTATIC TO OTHER SITE, UNSPECIFIED LATERALITY (HCC): Primary | ICD-10-CM

## 2018-04-17 PROCEDURE — 99213 OFFICE O/P EST LOW 20 MIN: CPT | Performed by: THORACIC SURGERY (CARDIOTHORACIC VASCULAR SURGERY)

## 2018-04-18 ENCOUNTER — OFFICE VISIT (OUTPATIENT)
Dept: ONCOLOGY | Facility: CLINIC | Age: 78
End: 2018-04-18

## 2018-04-18 VITALS
HEART RATE: 87 BPM | WEIGHT: 208 LBS | DIASTOLIC BLOOD PRESSURE: 63 MMHG | BODY MASS INDEX: 34.66 KG/M2 | RESPIRATION RATE: 14 BRPM | SYSTOLIC BLOOD PRESSURE: 137 MMHG | TEMPERATURE: 97.7 F | HEIGHT: 65 IN

## 2018-04-18 DIAGNOSIS — C34.12 CANCER OF UPPER LOBE OF LEFT LUNG (HCC): Primary | ICD-10-CM

## 2018-04-18 PROCEDURE — 99214 OFFICE O/P EST MOD 30 MIN: CPT | Performed by: INTERNAL MEDICINE

## 2018-04-18 NOTE — PROGRESS NOTES
DATE OF VISIT: 4/18/2018    REASON FOR VISIT: Followup for:  A. Stage IB, F3bA4W1, poorly differentiated adenocarcinoma of the lung.  B. Stage IB K0pK7D4  right upper lobe poorly differentiated adenocarcinoma  C. Isolated left adrenal metastases: Status post left adrenalectomy February 1, 2018    HISTORY OF PRESENT ILLNESS: The patient is a very pleasant 76-year-old female with past medical history significant for poorly differentiated adenocarcinoma of the lung, H3iB0O7, tumor size 3.2 cm in the left upper lobe. The patient is status post left upper lobe resection with mediastinal lymph node sampling done by Dr. Jeremy Herrera on 08/26/2015. The patient is here today in scheduled followup visit.    SUBJECTIVE: The patient is here today with her .  She is been doing fairly well.  She is complaining of mild pain in her abdomen.  Denied any weight loss no abdominal pain no back pain.     REVIEW OF SYSTEMS: All the other systems are reviewed by me and negative  except what is mentioned in HPI and subjectives.     PAST MEDICAL HISTORY/SOCIAL HISTORY/FAMILY HISTORY: Unchanged from my prior  documentation done on 08/13/2015.      Current Outpatient Prescriptions:   •  Ascorbic Acid (VITAMIN C PO), Take 1,000 mg by mouth Daily., Disp: , Rfl:   •  aspirin 81 MG tablet, Take 81 mg by mouth Daily. Last dose 3-29-18, Disp: , Rfl:   •  Cholecalciferol (VITAMIN D3 PO), Take 5,000 Units by mouth Daily., Disp: , Rfl:   •  CRANBERRY PO, Take 1,000 mg by mouth Daily. 2, 500MG CAPSULES DAILY, Disp: , Rfl:   •  docusate sodium (COLACE) 100 MG capsule, Take 100 mg by mouth Daily., Disp: , Rfl:   •  ezetimibe (ZETIA) 10 MG tablet, Take 10 mg by mouth Every Night., Disp: , Rfl:   •  guaiFENesin (MUCINEX) 600 MG 12 hr tablet, Take 1,200 mg by mouth 2 (Two) Times a Day., Disp: , Rfl:   •  HYDROcod Polst-CPM Polst ER (TUSSIONEX PENNKINETIC ER) 10-8 MG/5ML ER suspension, Take 5 mL by mouth Every 12 (Twelve) Hours As Needed for Cough.,  "Disp: 115 mL, Rfl: 0  •  loratadine (CLARITIN) 10 MG tablet, Take 10 mg by mouth Daily., Disp: , Rfl:   •  mometasone-formoterol (DULERA 100) 100-5 MCG/ACT inhaler, Inhale 2 puffs Daily As Needed (SHORTNESS OF AIR)., Disp: , Rfl:   •  montelukast (SINGULAIR) 10 MG tablet, Take 10 mg by mouth Daily As Needed (allergies)., Disp: , Rfl:   •  Multiple Vitamin tablet, Take 1 tablet by mouth Daily., Disp: , Rfl:   •  olmesartan-hydrochlorothiazide (BENICAR HCT) 40-12.5 MG per tablet, Take 1 tablet by mouth Daily., Disp: , Rfl:   •  simethicone (MYLICON) 125 MG chewable tablet, Chew 125 mg Every 6 (Six) Hours As Needed for Flatulence., Disp: , Rfl:   •  simvastatin (ZOCOR) 80 MG tablet, Take 80 mg by mouth Every Night., Disp: , Rfl:   •  traMADol (ULTRAM) 50 MG tablet, Take 1 tablet by mouth Every 6 (Six) Hours As Needed for pain, Disp: 20 tablet, Rfl: 0  •  Triamcinolone Acetonide (NASACORT AQ NA), 2 sprays into each nostril Daily., Disp: , Rfl:   No current facility-administered medications for this visit.     Facility-Administered Medications Ordered in Other Visits:   •  Chlorhexidine Gluconate Cloth 2 % pads 1 application, 1 application, Topical, Q12H PRN, BLANCA Rios    PHYSICAL EXAMINATION:   /63   Pulse 87   Temp 97.7 °F (36.5 °C) (Temporal Artery )   Resp 14   Ht 165.1 cm (65\")   Wt 94.3 kg (208 lb)   BMI 34.61 kg/m²   ECOG1  GENERAL: Age appropriate. No acute distress.   HEENT: Head atraumatic, normocephalic.   NECK: Supple. No JVD. No lymphadenopathy.   LUNGS: Clear to auscultation bilaterally. No wheezing. No rhonchi.   HEART: Regular rate and rhythm. S1, S2, no murmurs.   ABDOMEN: Soft, nontender, nondistended. Bowel sounds positive. No  hepatosplenomegaly.   EXTREMITIES: No clubbing, cyanosis, +1 bilateral pedal edema.   SKIN: No rashes. No purpura.   NEUROLOGIC: Awake and oriented x3. Strength 5 out of 5 in all muscle groups.     Admission on 04/06/2018, Discharged on 04/08/2018 "   Component Date Value Ref Range Status   • Product Code 04/07/2018 L3304X54   Final   • Unit Number 04/07/2018 [id number]   Final   • UNIT  ABO 04/07/2018 A   Final   • UNIT  RH 04/07/2018 POS   Final   • Dispense Status 04/07/2018 RE   Final   • Blood Type 04/07/2018 APOS   Final   • Blood Expiration Date 04/07/2018 201804132359   Final   • Blood Type Barcode 04/07/2018 6200   Final   • Product Code 04/07/2018 T5805O47   Final   • Unit Number 04/07/2018 [id number]   Final   • UNIT  ABO 04/07/2018 A   Final   • UNIT  RH 04/07/2018 POS   Final   • Dispense Status 04/07/2018 RE   Final   • Blood Type 04/07/2018 APOS   Final   • Blood Expiration Date 04/07/2018 201804122359   Final   • Blood Type Barcode 04/07/2018 6200   Final   • Case Report 04/09/2018    Final                    Value:Surgical Pathology Report                         Case: [id number]                                  Authorizing Provider:  Carol Ann Guzmán MD          Collected:           04/06/2018 01:19 PM          Ordering Location:     [facility]   Received:            04/06/2018 02:16 PM                                 OR                                                                           Pathologist:           Steven Haile MD                                                         Specimen:    Gland, Adrenal Left                                                                       • Clinical Information 04/09/2018    Final                    Value:This result contains rich text formatting which cannot be displayed here.   • Final Diagnosis 04/09/2018    Final                    Value:This result contains rich text formatting which cannot be displayed here.   • Comment 04/09/2018    Final                    Value:This result contains rich text formatting which cannot be displayed here.   • Gross Description 04/09/2018    Final                    Value:This result contains rich text formatting which cannot be  displayed here.   • Microscopic Description 04/09/2018    Final                    Value:This result contains rich text formatting which cannot be displayed here.   • Glucose 04/07/2018 100  70 - 100 mg/dL Final   • BUN 04/07/2018 20  9 - 23 mg/dL Final   • Creatinine 04/07/2018 1.00  0.60 - 1.30 mg/dL Final   • Sodium 04/07/2018 140  132 - 146 mmol/L Final   • Potassium 04/07/2018 4.2  3.5 - 5.5 mmol/L Final   • Chloride 04/07/2018 107  99 - 109 mmol/L Final   • CO2 04/07/2018 28.0  20.0 - 31.0 mmol/L Final   • Calcium 04/07/2018 8.8  8.7 - 10.4 mg/dL Final   • eGFR Non African Amer 04/07/2018 54* >60 mL/min/1.73 Final   • BUN/Creatinine Ratio 04/07/2018 20.0  7.0 - 25.0 Final   • Anion Gap 04/07/2018 5.0  3.0 - 11.0 mmol/L Final   • WBC 04/07/2018 10.67  3.50 - 10.80 10*3/mm3 Final   • RBC 04/07/2018 3.34* 3.89 - 5.14 10*6/mm3 Final   • Hemoglobin 04/07/2018 9.6* 11.5 - 15.5 g/dL Final   • Hematocrit 04/07/2018 30.5* 34.5 - 44.0 % Final   • MCV 04/07/2018 91.3  80.0 - 99.0 fL Final   • MCH 04/07/2018 28.7  27.0 - 31.0 pg Final   • MCHC 04/07/2018 31.5* 32.0 - 36.0 g/dL Final   • RDW 04/07/2018 13.9  11.3 - 14.5 % Final   • RDW-SD 04/07/2018 46.6  37.0 - 54.0 fl Final   • MPV 04/07/2018 10.5  6.0 - 12.0 fL Final   • Platelets 04/07/2018 230  150 - 450 10*3/mm3 Final   • Neutrophil % 04/07/2018 77.6* 41.0 - 71.0 % Final   • Lymphocyte % 04/07/2018 11.4* 24.0 - 44.0 % Final   • Monocyte % 04/07/2018 10.6  0.0 - 12.0 % Final   • Eosinophil % 04/07/2018 0.1  0.0 - 3.0 % Final   • Basophil % 04/07/2018 0.1  0.0 - 1.0 % Final   • Immature Grans % 04/07/2018 0.2  0.0 - 0.6 % Final   • Neutrophils, Absolute 04/07/2018 8.28  1.50 - 8.30 10*3/mm3 Final   • Lymphocytes, Absolute 04/07/2018 1.22  0.60 - 4.80 10*3/mm3 Final   • Monocytes, Absolute 04/07/2018 1.13* 0.00 - 1.00 10*3/mm3 Final   • Eosinophils, Absolute 04/07/2018 0.01  0.00 - 0.30 10*3/mm3 Final   • Basophils, Absolute 04/07/2018 0.01  0.00 - 0.20 10*3/mm3 Final    • Immature Grans, Absolute 04/07/2018 0.02  0.00 - 0.03 10*3/mm3 Final      No results found.(    ASSESSMENT: The patient is a very pleasant 76-year-old female with stage IB  adenocarcinoma of the lung.    PROBLEM LIST:  1. Stage IB poorly differentiated adenocarcinoma of the lung, [id number]:  A. Diagnosed 08/03/2015 after CT-guided biopsy.  B. Status post left upper lobe resection with mediastinal lymph node sampling  done by Dr. Herrera on 08/26/2015.  C. New right upper lobe lung nodule with left adrenal nodule, both were hypermetabolic active on PET scan done on November 6, 2017  2.  Stage IB right upper lobe adenocarcinoma [id number]:  A. presented with hypermetabolic nodule on a screening PET scan.  B.  Status post surgical resection with a clean margins done by Dr. Herrera November 30 2017  C.  Final pathology revealed 1.7 adenocarcinoma with pleural involvement negative hilar and mediastinal nodes and clear surgical margins  3.  Isolated left adrenal metastases:  A.  Status post left adrenalectomy done on February 1, 2018  B. Pathology report revealed metastatic adenocarcinoma lung primary with positive margin  4.  Hypertension  5.  Hypercholesterolemia  6.  Heartburn    PLAN:  1.  I did go over the final pathology report in detail with the patient and her .  I discussed the case with Sharon Leon and Michelle.  The patient will have follow-up appointment with Dr. Martinez May 1, 2018 for consideration of involved field radiation secondary to the positive margin.   2.  She will have repeat CBC and CMP prior to return.  3.  I will order repeat CT chest with contrast prior to return.  4.  The patient will follow-up with me in 2 months.  5.  We'll continue hydrochlorothiazide with Benicar for hypertension  6.  Continue simvastatin for hypercholesterolemia  6.  We'll continue omeprazole 20 mg daily for heartburn    Luisana Washburn MD  4/18/2018

## 2018-04-30 ENCOUNTER — HOSPITAL ENCOUNTER (OUTPATIENT)
Dept: CT IMAGING | Facility: HOSPITAL | Age: 78
Discharge: HOME | End: 2018-04-30
Attending: THORACIC SURGERY (CARDIOTHORACIC VASCULAR SURGERY) | Admitting: THORACIC SURGERY (CARDIOTHORACIC VASCULAR SURGERY)

## 2018-04-30 DIAGNOSIS — C34.90 PRIMARY MALIGNANT NEOPLASM OF LUNG METASTATIC TO OTHER SITE, UNSPECIFIED LATERALITY (HCC): ICD-10-CM

## 2018-04-30 PROCEDURE — 74178 CT ABD&PLV WO CNTR FLWD CNTR: CPT

## 2018-04-30 PROCEDURE — 25010000002 IOPAMIDOL 61 % SOLUTION: Performed by: THORACIC SURGERY (CARDIOTHORACIC VASCULAR SURGERY)

## 2018-04-30 PROCEDURE — 71270 CT THORAX DX C-/C+: CPT

## 2018-04-30 RX ADMIN — IOPAMIDOL 95 ML: 612 INJECTION, SOLUTION INTRAVENOUS at 09:10

## 2018-05-01 ENCOUNTER — HOSPITAL ENCOUNTER (OUTPATIENT)
Dept: RADIATION ONCOLOGY | Facility: HOSPITAL | Age: 78
Setting detail: RADIATION/ONCOLOGY SERIES
Discharge: HOME | End: 2018-05-01

## 2018-05-01 ENCOUNTER — OFFICE VISIT (OUTPATIENT)
Dept: RADIATION ONCOLOGY | Facility: HOSPITAL | Age: 78
End: 2018-05-01

## 2018-05-01 VITALS
TEMPERATURE: 97.1 F | WEIGHT: 208.3 LBS | SYSTOLIC BLOOD PRESSURE: 132 MMHG | BODY MASS INDEX: 34.66 KG/M2 | HEART RATE: 99 BPM | DIASTOLIC BLOOD PRESSURE: 59 MMHG | OXYGEN SATURATION: 96 % | RESPIRATION RATE: 18 BRPM

## 2018-05-01 DIAGNOSIS — C34.90 PRIMARY MALIGNANT NEOPLASM OF LUNG METASTATIC TO OTHER SITE, UNSPECIFIED LATERALITY (HCC): ICD-10-CM

## 2018-05-01 DIAGNOSIS — C79.72 MALIGNANT NEOPLASM METASTATIC TO LEFT ADRENAL GLAND (HCC): Primary | ICD-10-CM

## 2018-05-01 PROCEDURE — G0463 HOSPITAL OUTPT CLINIC VISIT: HCPCS | Performed by: RADIOLOGY

## 2018-05-01 RX ORDER — ONDANSETRON 8 MG/1
TABLET, ORALLY DISINTEGRATING ORAL
Qty: 10 TABLET | Refills: 1 | Status: SHIPPED | OUTPATIENT
Start: 2018-05-01 | End: 2018-09-20

## 2018-05-01 NOTE — PROGRESS NOTES
CONSULTATION NOTE      :                                                          1940  DATE OF CONSULTATION:                       2018   REQUESTING PHYSICIAN:                   Luisana Washburn MD  REASON FOR CONSULTATION:           Metastatic lung cancer         Solitary Left adrenal metastasis         BRIEF HISTORY:  The patient is a very pleasant 78 y.o. female  with adenocarcinoma right lung primarily resected with clear margins and negative lymph nodes.  She's maintain excellent performance status.  She has been followed for a persistent left adrenal tumor, hypermetabolic on PET/CT.  Needle biopsy 2018 showed metastatic adenocarcinoma.  On 2018 she underwent left adrenalectomy.  The gland contained a well-defined, firm 2.5 x 2 x 1.5 cm nodule.  Tumor involved the inked surgical margin.  Recent restaging CT scans of the chest and abdomen show left upper quadrant post-surgical change, as well as some residual thickening, perhaps indicating some residual adrenal gland.  No evidence of intrathoracic recurrence or gross disease elsewhere.  She was referred for consideration of adjuvant radiotherapy/SBRT.    Allergies   Allergen Reactions   • Pneumococcal Vaccines Swelling and Other (See Comments)     REDNESS AND SWELLING OF ARM    • Codeine Nausea And Vomiting   • Hydrocodone Nausea And Vomiting       Social History     Social History   • Marital status:    • Number of children: 0     Occupational History   • retired      [employer]      Social History Main Topics   • Smoking status: Former Smoker     Packs/day: 0.50     Years: 35.00     Quit date: 2015   • Smokeless tobacco: Never Used      Comment: smoked up to 2ppd at times   • Alcohol use No   • Drug use: No   • Sexual activity: Defer     Other Topics Concern   • Not on file     Social History Narrative    Lives in [facility] with spouse.        Past Medical History:   Diagnosis Date   • Arthritis    • GERD (gastroesophageal reflux  disease)    • Hypercholesteremia    • Hypertension    • Lung cancer 2015    LEFT LUNG LOBECTOMY    • Lung cancer 2017    RIGHT    • On home O2     2.5 L HS,    • Pneumothorax 2015    AFTER LUNG BIOPSY    • Seasonal allergies    • Wears dentures    • Wears glasses        family history includes Heart attack in her father and mother; Hypertension in her mother; Leukemia in her maternal aunt; Other in her sister.     Past Surgical History:   Procedure Laterality Date   • ADRENALECTOMY Left 4/6/2018    Procedure: ADRENALECTOMY LAPAROSCOPIC LEFT;  Surgeon: Carol Ann Guzmán MD;  Location:  [facility];  Service: General   • BREAST BIOPSY Right    • BREAST LUMPECTOMY Left    • BRONCHOSCOPY N/A 11/30/2017    Procedure: BRONCHOSCOPY;  Surgeon: Jeremy Herrera MD;  Location:  [facility];  Service:    • CHEST TUBE INSERTION  2015    LEFT LUNG AFTER LUNG BIOPSY D/T PNEUMOTHORAX    • COLONOSCOPY  2015   • HYSTERECTOMY     • LUNG BIOPSY Bilateral     LEFT IN 2015, RIGHT IN 2017    • LUNG LOBECTOMY Left 08/2015    YELENA PER DR HERRERA    • TEETH EXTRACTION     • THORACOSCOPY VIDEO ASSISTED WITH LOBECTOMY Right 11/30/2017    Procedure: THORACOSCOPY VIDEO ASSISTED WITH RIGHT UPPER LOBE WEDGE RESECTION AND COMPLETION OF RIGHT UPPER LOBECTOMY, MEDIASTINAL LYMPH NODE DISSECTION AND INTERCOSTAL NERVE BLOCKS;  Surgeon: Jeremy Herrera MD;  Location:  [facility];  Service:         Review of Systems   Respiratory: Positive for shortness of breath (with exertion- per pt).         Reports 2.5L O2 at HS   Skin:        Small scabbed wound to right wrist area   Hematological: Bruises/bleeds easily.   All other systems reviewed and are negative.          Objective   VITAL SIGNS:   Vitals:    05/01/18 1300   BP: 132/59   Pulse: 99   Resp: 18   Temp: 97.1 °F (36.2 °C)   TempSrc: Temporal Artery    SpO2: 96%   Weight: 94.5 kg (208 lb 4.8 oz)   PainSc: 0-No pain        Karnofsky score: 80      Physical Exam   Constitutional: She is oriented to person, place, and  time. She appears well-developed and well-nourished.   HENT:   Head: Normocephalic and atraumatic.   Neck: Normal range of motion. Neck supple.   Cardiovascular: Normal rate, regular rhythm and normal heart sounds.    No murmur heard.  Pulmonary/Chest: Effort normal and breath sounds normal. She has no wheezes. She has no rales.   Bilateral well-healing incisions without masses   Abdominal: Soft. Bowel sounds are normal. She exhibits no distension. There is no hepatosplenomegaly. There is no tenderness.   Musculoskeletal: Normal range of motion. She exhibits no edema or tenderness.   Lymphadenopathy:     She has no cervical adenopathy.     She has no axillary adenopathy.        Right: No supraclavicular adenopathy present.        Left: No supraclavicular adenopathy present.   Neurological: She is alert and oriented to person, place, and time. She has normal strength. No sensory deficit.   Skin: Skin is warm and dry.   Recent thermal burn right forearm erythema and eschar.   Psychiatric: She has a normal mood and affect. Her behavior is normal. Judgment and thought content normal.   Nursing note and vitals reviewed.           The following portions of the patient's history were reviewed and updated as appropriate: allergies, current medications, past family history, past medical history, past social history, past surgical history and problem list.    Assessment      Adenocarcinoma of the right lung, T2a N0, status post thoracotomy resection November 2017.  Solitary left adrenal metastasis grossly resected 3 weeks ago.  Surgical margin was involved.  She is not planned for systemic treatment, if there is an option for local radiotherapy to improve her chances of remaining disease-free.  This will be best accomplished with stereotactic body radiotherapy, if we can have fiducials placed in or around the target area.  I discussed this case with Dr. Sudeep Hirsch who believes there may be some residual adrenal tissue and it  would be worth attempting CT-guided fiducial placement.    RECOMMENDATIONS:  CT-guided fiducial placement in approximately 3 weeks to allow some additional time for healing after recent surgery.  She will return one week later for CT simulation.  The left adrenal bed/residual tissue will likely receive 3 fractions of 10 Gray each, every other day schedule, on the CyberKnife.  Pretreatment antiemetic with Zofran will be prescribed.  She desires mid-day appointments, since she has to travel.  Aquaphor appointment sampled for thermal burn on the right forearm.    Return in about 4 weeks (around 5/29/2018) for Office Visit, Simulation, CyberKnife treatment.  America was seen today for other.    Diagnoses and all orders for this visit:    Malignant neoplasm metastatic to left adrenal gland    Primary malignant neoplasm of lung metastatic to other site, unspecified laterality  -     ondansetron ODT (ZOFRAN ODT) 8 MG disintegrating tablet; Take one tablet one hour prior to procedure and every 8 hours as needed  -     CT Guidance For Fiducial Placement; Future         Craig Martinez MD

## 2018-05-18 ENCOUNTER — TELEPHONE (OUTPATIENT)
Dept: RADIATION ONCOLOGY | Facility: HOSPITAL | Age: 78
End: 2018-05-18

## 2018-05-18 NOTE — TELEPHONE ENCOUNTER
Called pt because I was notified by Mili Martinez, pt requested that someone call her.  I informed her insurance auth is approved for fiducial placement but not for cyberknife.  Explained I discussed with Dr. Martinez and he wants her to proceed with fiducial placement on Monday.  Insurance has been denied for ck but Dr. Martinez is working on an appeal. I explained even if ck is denied that fiducial placement is needed for IMRT treatment.  Pt asked about her next appt with Dr. Martinez.  I instructed her it was on 5/29/18 @ 1400.  Pt verbalized understanding.

## 2018-05-21 ENCOUNTER — HOSPITAL ENCOUNTER (OUTPATIENT)
Dept: CT IMAGING | Facility: HOSPITAL | Age: 78
Discharge: HOME | End: 2018-05-21
Attending: RADIOLOGY | Admitting: RADIOLOGY

## 2018-05-21 VITALS
RESPIRATION RATE: 18 BRPM | TEMPERATURE: 97.8 F | WEIGHT: 205 LBS | HEART RATE: 83 BPM | DIASTOLIC BLOOD PRESSURE: 79 MMHG | SYSTOLIC BLOOD PRESSURE: 123 MMHG | OXYGEN SATURATION: 98 % | HEIGHT: 65 IN | BODY MASS INDEX: 34.16 KG/M2

## 2018-05-21 DIAGNOSIS — C34.90 PRIMARY MALIGNANT NEOPLASM OF LUNG METASTATIC TO OTHER SITE, UNSPECIFIED LATERALITY (HCC): ICD-10-CM

## 2018-05-21 LAB
APTT PPP: 26.2 SECONDS (ref 24–31)
INR PPP: 0.93 (ref 0.91–1.09)
PLATELET # BLD AUTO: 226 10*3/MM3 (ref 150–450)
PROTHROMBIN TIME: 9.8 SECONDS (ref 9.6–11.5)

## 2018-05-21 PROCEDURE — 85730 THROMBOPLASTIN TIME PARTIAL: CPT | Performed by: RADIOLOGY

## 2018-05-21 PROCEDURE — 85610 PROTHROMBIN TIME: CPT | Performed by: RADIOLOGY

## 2018-05-21 PROCEDURE — A4648 IMPLANTABLE TISSUE MARKER: HCPCS

## 2018-05-21 PROCEDURE — 85049 AUTOMATED PLATELET COUNT: CPT | Performed by: RADIOLOGY

## 2018-05-21 RX ORDER — SODIUM CHLORIDE 0.9 % (FLUSH) 0.9 %
1-10 SYRINGE (ML) INJECTION AS NEEDED
Status: DISCONTINUED | OUTPATIENT
Start: 2018-05-21 | End: 2018-05-22 | Stop reason: HOSPADM

## 2018-05-21 RX ORDER — LIDOCAINE HYDROCHLORIDE 10 MG/ML
10 INJECTION, SOLUTION INFILTRATION; PERINEURAL ONCE
Status: COMPLETED | OUTPATIENT
Start: 2018-05-21 | End: 2018-05-21

## 2018-05-21 RX ADMIN — LIDOCAINE HYDROCHLORIDE 10 ML: 10 INJECTION, SOLUTION INFILTRATION; PERINEURAL at 11:30

## 2018-05-21 NOTE — NURSING NOTE
Procedure complete.  Three fiducials placed.  Patient tolerated well.  Report called to VERONIKA.  Pt returned to room via hospital transport.

## 2018-05-21 NOTE — NURSING NOTE
Returned to room. Tolerated procedure well. No complaints offered. HOB elevated. Vss. Provided lunch tray.

## 2018-05-21 NOTE — PROCEDURES
Radiology Procedure    Pre-procedure: Left adrenal met with subtotal resection for fiducial        Procedure Performed: CT-guided fiducial placement      IV Sedation and/or Anesthesia:  No    Complications: Minimal left retroperitoneal hematoma formation upon removal of introducer needle    Preliminary Findings: Left adrenal postsurgical appearance    Specimen Removed: None    Estimated Blood Loss:  0ml    Post-Procedure Diagnosis: Left adrenal mets with subtotal resection     Post-Procedure Plan: Return to ordering physician recommendations and orders. Observation in holding/PACU to ensure hemodynamic stability prior to release.    Standard Discharge Instructions Given:yes     Noble Harrell DO  05/21/18  12:13 PM

## 2018-05-21 NOTE — NURSING NOTE
Given printed and oral discharge instructions. Verbalizes understanding. discharged per wheelchair to front entrance with niece driving.

## 2018-05-22 ENCOUNTER — TELEPHONE (OUTPATIENT)
Dept: INTERVENTIONAL RADIOLOGY/VASCULAR | Facility: HOSPITAL | Age: 78
End: 2018-05-22

## 2018-05-22 NOTE — TELEPHONE ENCOUNTER
@[id number])@    Other Comments:

## 2018-05-29 ENCOUNTER — OFFICE VISIT (OUTPATIENT)
Dept: RADIATION ONCOLOGY | Facility: HOSPITAL | Age: 78
End: 2018-05-29

## 2018-05-29 VITALS
WEIGHT: 209 LBS | RESPIRATION RATE: 20 BRPM | SYSTOLIC BLOOD PRESSURE: 109 MMHG | BODY MASS INDEX: 34.78 KG/M2 | HEART RATE: 106 BPM | DIASTOLIC BLOOD PRESSURE: 69 MMHG | OXYGEN SATURATION: 96 % | TEMPERATURE: 97.5 F

## 2018-05-29 DIAGNOSIS — C79.72 MALIGNANT NEOPLASM METASTATIC TO LEFT ADRENAL GLAND (HCC): Primary | ICD-10-CM

## 2018-05-29 PROCEDURE — 77470 SPECIAL RADIATION TREATMENT: CPT | Performed by: RADIOLOGY

## 2018-05-29 PROCEDURE — G0463 HOSPITAL OUTPT CLINIC VISIT: HCPCS

## 2018-05-29 PROCEDURE — 77290 THER RAD SIMULAJ FIELD CPLX: CPT | Performed by: RADIOLOGY

## 2018-05-29 NOTE — PROGRESS NOTES
RE-EVALUATION    PATIENT:                                                      America Nix  :                                                          1940  DATE:                          2018   DIAGNOSIS:     Malignant neoplasm mets to left adrenal gland       BRIEF HISTORY:  The patient is a very pleasant 78 y.o. female  with partially resected left adrenal metastasis, positive margin.  She tolerated fiducial placement well.  She's here today for simulation in preparation for SBRT.    Allergies   Allergen Reactions   • Pneumococcal Vaccines Swelling and Other (See Comments)     REDNESS AND SWELLING OF ARM    • Codeine Nausea And Vomiting   • Hydrocodone Nausea And Vomiting       Review of Systems   Constitutional: Negative.    HENT:  Negative.    Eyes: Negative.    Respiratory: Positive for cough (sinus drainage).    Cardiovascular: Positive for leg swelling.   Endocrine: Negative.    Genitourinary: Negative.     Musculoskeletal: Negative.    Skin: Negative.    Neurological: Negative.    Hematological: Bruises/bleeds easily.   Psychiatric/Behavioral: Negative.            Objective   VITAL SIGNS:   Vitals:    18 1345   BP: 109/69   Pulse: 106   Resp: 20   Temp: 97.5 °F (36.4 °C)   TempSrc: Temporal Artery    SpO2: 96%   Weight: 94.8 kg (209 lb)   PainSc: 0-No pain        KPS 80%    Physical Exam   Constitutional: She is oriented to person, place, and time.   Cardiovascular: Normal rate, regular rhythm and normal heart sounds.    Pulmonary/Chest: Effort normal and breath sounds normal.   Abdominal: Soft. Bowel sounds are normal.   Left flank needle entrance site clean.  No mass.  No ecchymosis.   Musculoskeletal: Normal range of motion.   Lymphadenopathy:     She has no cervical adenopathy.   Neurological: She is alert and oriented to person, place, and time.   Skin: Skin is warm and dry.            The following portions of the patient's history were reviewed and updated as appropriate:  allergies, current medications, past family history, past medical history, past social history, past surgical history and problem list.    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    RECOMMENDATIONS:  Treatment planning CT today.  SBRT to deliver approximately 30 Gray in 3 daily fractions on the CyberKnife.  Pretreatment antiemetic with Zofran ODT.  She will be treated with nothing by mouth approximate 3 hours before each session.         Craig Martinez MD

## 2018-06-05 ENCOUNTER — HOSPITAL ENCOUNTER (OUTPATIENT)
Dept: RADIATION ONCOLOGY | Facility: HOSPITAL | Age: 78
Setting detail: RADIATION/ONCOLOGY SERIES
Discharge: HOME | End: 2018-06-05

## 2018-06-05 PROCEDURE — 77295 3-D RADIOTHERAPY PLAN: CPT | Performed by: RADIOLOGY

## 2018-06-05 PROCEDURE — 77300 RADIATION THERAPY DOSE PLAN: CPT | Performed by: RADIOLOGY

## 2018-06-05 PROCEDURE — 77334 RADIATION TREATMENT AID(S): CPT | Performed by: RADIOLOGY

## 2018-06-05 PROCEDURE — 77370 RADIATION PHYSICS CONSULT: CPT | Performed by: RADIOLOGY

## 2018-06-06 ENCOUNTER — HOSPITAL ENCOUNTER (OUTPATIENT)
Dept: RADIATION ONCOLOGY | Facility: HOSPITAL | Age: 78
Discharge: HOME | End: 2018-06-06

## 2018-06-06 PROCEDURE — 77336 RADIATION PHYSICS CONSULT: CPT | Performed by: RADIOLOGY

## 2018-06-06 PROCEDURE — 77290 THER RAD SIMULAJ FIELD CPLX: CPT | Performed by: RADIOLOGY

## 2018-06-06 PROCEDURE — 77373 STRTCTC BDY RAD THER TX DLVR: CPT | Performed by: RADIOLOGY

## 2018-06-07 ENCOUNTER — HOSPITAL ENCOUNTER (OUTPATIENT)
Dept: RADIATION ONCOLOGY | Facility: HOSPITAL | Age: 78
Discharge: HOME | End: 2018-06-07

## 2018-06-07 PROCEDURE — 77373 STRTCTC BDY RAD THER TX DLVR: CPT | Performed by: RADIOLOGY

## 2018-06-07 PROCEDURE — 77280 THER RAD SIMULAJ FIELD SMPL: CPT | Performed by: RADIOLOGY

## 2018-06-08 ENCOUNTER — HOSPITAL ENCOUNTER (OUTPATIENT)
Dept: RADIATION ONCOLOGY | Facility: HOSPITAL | Age: 78
Discharge: HOME | End: 2018-06-08

## 2018-06-08 PROCEDURE — 77280 THER RAD SIMULAJ FIELD SMPL: CPT | Performed by: RADIOLOGY

## 2018-06-08 PROCEDURE — 77373 STRTCTC BDY RAD THER TX DLVR: CPT | Performed by: RADIOLOGY

## 2018-06-18 ENCOUNTER — HOSPITAL ENCOUNTER (OUTPATIENT)
Dept: CT IMAGING | Facility: HOSPITAL | Age: 78
Discharge: HOME | End: 2018-06-18
Attending: INTERNAL MEDICINE | Admitting: INTERNAL MEDICINE

## 2018-06-18 DIAGNOSIS — C34.12 CANCER OF UPPER LOBE OF LEFT LUNG (HCC): ICD-10-CM

## 2018-06-18 LAB — CREAT BLDA-MCNC: 1.2 MG/DL (ref 0.6–1.3)

## 2018-06-18 PROCEDURE — 71260 CT THORAX DX C+: CPT

## 2018-06-18 PROCEDURE — 25010000002 IOPAMIDOL 61 % SOLUTION: Performed by: INTERNAL MEDICINE

## 2018-06-18 PROCEDURE — 82565 ASSAY OF CREATININE: CPT

## 2018-06-18 RX ADMIN — IOPAMIDOL 75 ML: 612 INJECTION, SOLUTION INTRAVENOUS at 08:30

## 2018-06-19 ENCOUNTER — OFFICE VISIT (OUTPATIENT)
Dept: CARDIAC SURGERY | Facility: CLINIC | Age: 78
End: 2018-06-19

## 2018-06-19 VITALS
SYSTOLIC BLOOD PRESSURE: 123 MMHG | BODY MASS INDEX: 34.16 KG/M2 | TEMPERATURE: 98 F | HEIGHT: 65 IN | WEIGHT: 205 LBS | DIASTOLIC BLOOD PRESSURE: 82 MMHG | HEART RATE: 77 BPM | OXYGEN SATURATION: 98 %

## 2018-06-19 DIAGNOSIS — C34.90 PRIMARY MALIGNANT NEOPLASM OF LUNG METASTATIC TO OTHER SITE, UNSPECIFIED LATERALITY (HCC): Primary | ICD-10-CM

## 2018-06-19 PROCEDURE — 99213 OFFICE O/P EST LOW 20 MIN: CPT | Performed by: PHYSICIAN ASSISTANT

## 2018-06-19 NOTE — PROGRESS NOTES
06/19/2018  Patient Information  America Nix                                                                                          [address] [place] [address]   1940  'PCP/Referring Physician'  Hai Solomon MD  [phone]  No ref. provider found    Chief Complaint   Patient presents with   • Follow-up     3 month follow to discuss CT chest results for lung cancer.   • Lung Cancer       History of Present Illness:  Patient is a 78-year-old  female with history of hypertension, hypercholesterolemia, stage IB poorly differentiated adenocarcinoma status post left upper lobectomy and stage IB moderately differentiated adenocarcinoma status post right upper lobectomy who presents to office today for 3 month follow-up and to discuss results of recent CT chest scan.  Patient underwent a left adrenalectomy by Dr. Guzmán, which final pathology revealed positive margins.  The patient is followed by her oncologist, Dr. Washburn, and will see him again tomorrow.  She has already received CyberKnife therapy ×3, two weeks ago by Dr. Martinez, and will follow up with him on 9/6/18.  Patient was last seen by our office on 4/17/18 and denies any interval weight loss, hemoptysis or fatigue.  I reviewed the patient's most recent CT scan of the chest and discussed the results with her, and I will place the results in the diagnostic section below.      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     Past Medical History:   Diagnosis Date   • Arthritis    • GERD (gastroesophageal reflux disease)    • Hypercholesteremia    • Hypertension    • Lung cancer 2015    LEFT LUNG LOBECTOMY    • Lung cancer 2017    RIGHT    • On home O2     2.5 L HS,    • Pneumothorax 2015    AFTER LUNG BIOPSY    • Seasonal allergies    • Wears dentures    •  Wears glasses      Past Surgical History:   Procedure Laterality Date   • ADRENALECTOMY Left 4/6/2018    Procedure: ADRENALECTOMY LAPAROSCOPIC LEFT;  Surgeon: Carol Ann Guzmán MD;  Location:  [facility];  Service: General   • BREAST BIOPSY Right    • BREAST LUMPECTOMY Left    • BRONCHOSCOPY N/A 11/30/2017    Procedure: BRONCHOSCOPY;  Surgeon: Jeremy Herrera MD;  Location:  [facility];  Service:    • CHEST TUBE INSERTION  2015    LEFT LUNG AFTER LUNG BIOPSY D/T PNEUMOTHORAX    • COLONOSCOPY  2015   • GOLD SEED FIDUCIAL PLACEMENT  05/21/2018    left adrenal gland   • HYSTERECTOMY     • LUNG BIOPSY Bilateral     LEFT IN 2015, RIGHT IN 2017    • LUNG LOBECTOMY Left 08/2015    [facility] PER DR HERRERA    • TEETH EXTRACTION     • THORACOSCOPY VIDEO ASSISTED WITH LOBECTOMY Right 11/30/2017    Procedure: THORACOSCOPY VIDEO ASSISTED WITH RIGHT UPPER LOBE WEDGE RESECTION AND COMPLETION OF RIGHT UPPER LOBECTOMY, MEDIASTINAL LYMPH NODE DISSECTION AND INTERCOSTAL NERVE BLOCKS;  Surgeon: Jeremy Herrera MD;  Location:  [facility];  Service:        Current Outpatient Prescriptions:   •  Ascorbic Acid (VITAMIN C PO), Take 1,000 mg by mouth Daily., Disp: , Rfl:   •  aspirin 81 MG tablet, Take 81 mg by mouth Daily. Last dose 3-29-18, Disp: , Rfl:   •  Cholecalciferol (VITAMIN D3 PO), Take 5,000 Units by mouth Daily., Disp: , Rfl:   •  CRANBERRY PO, Take 1,000 mg by mouth Daily. 2, 500MG CAPSULES DAILY, Disp: , Rfl:   •  docusate sodium (COLACE) 100 MG capsule, Take 100 mg by mouth Daily., Disp: , Rfl:   •  ezetimibe (ZETIA) 10 MG tablet, Take 10 mg by mouth Every Night., Disp: , Rfl:   •  guaiFENesin (MUCINEX) 600 MG 12 hr tablet, Take 1,200 mg by mouth 2 (Two) Times a Day., Disp: , Rfl:   •  loratadine (CLARITIN) 10 MG tablet, Take 10 mg by mouth Daily., Disp: , Rfl:   •  mometasone-formoterol (DULERA 100) 100-5 MCG/ACT inhaler, Inhale 2 puffs Daily As Needed (SHORTNESS OF AIR)., Disp: , Rfl:   •  montelukast (SINGULAIR) 10 MG tablet, Take 10 mg by  mouth Daily As Needed (allergies)., Disp: , Rfl:   •  Multiple Vitamin tablet, Take 1 tablet by mouth Daily., Disp: , Rfl:   •  olmesartan-hydrochlorothiazide (BENICAR HCT) 40-12.5 MG per tablet, Take 1 tablet by mouth Daily., Disp: , Rfl:   •  ondansetron ODT (ZOFRAN ODT) 8 MG disintegrating tablet, Take one tablet one hour prior to procedure and every 8 hours as needed, Disp: 10 tablet, Rfl: 1  •  simethicone (MYLICON) 125 MG chewable tablet, Chew 125 mg Every 6 (Six) Hours As Needed for Flatulence., Disp: , Rfl:   •  simvastatin (ZOCOR) 80 MG tablet, Take 80 mg by mouth Every Night., Disp: , Rfl:   •  traMADol (ULTRAM) 50 MG tablet, Take 1 tablet by mouth Every 6 (Six) Hours As Needed for pain, Disp: 20 tablet, Rfl: 0  •  Triamcinolone Acetonide (NASACORT AQ NA), 2 sprays into each nostril Daily., Disp: , Rfl:   No current facility-administered medications for this visit.     Facility-Administered Medications Ordered in Other Visits:   •  Chlorhexidine Gluconate Cloth 2 % pads 1 application, 1 application, Topical, Q12H PRN, BLANCA Rios  Allergies   Allergen Reactions   • Pneumococcal Vaccines Swelling and Other (See Comments)     REDNESS AND SWELLING OF ARM    • Codeine Nausea And Vomiting   • Hydrocodone Nausea And Vomiting     Social History     Social History   • Marital status:      Spouse name: Hardy   • Number of children: 0   • Years of education: N/A     Occupational History   • retired      [employer]      Social History Main Topics   • Smoking status: Former Smoker     Packs/day: 0.50     Years: 35.00     Quit date: 08/2015   • Smokeless tobacco: Never Used      Comment: smoked up to 2ppd at times   • Alcohol use No   • Drug use: No   • Sexual activity: Defer     Other Topics Concern   • Not on file     Social History Narrative    Lives in [place] with spouse.     Jaimie, niece with pt for appts.     Family History   Problem Relation Age of Onset   • Hypertension Mother    • Heart  "attack Mother    • Heart attack Father    • Other Sister         renal mass   • Leukemia Maternal Aunt      Review of Systems   Constitution: Negative. Negative for chills, fever, malaise/fatigue, night sweats and weight loss.   HENT: Negative.  Negative for hearing loss, odynophagia and sore throat.    Eyes: Negative.    Cardiovascular: Positive for dyspnea on exertion and leg swelling. Negative for chest pain, orthopnea and palpitations.   Respiratory: Negative for cough and hemoptysis.    Endocrine: Negative.  Negative for cold intolerance, heat intolerance, polydipsia, polyphagia and polyuria.   Hematologic/Lymphatic: Bruises/bleeds easily.   Skin: Negative.  Negative for itching and rash.   Musculoskeletal: Positive for muscle weakness. Negative for joint pain, joint swelling and myalgias.   Gastrointestinal: Negative.  Negative for abdominal pain, constipation, diarrhea, hematemesis, hematochezia, melena, nausea and vomiting.   Genitourinary: Negative.  Negative for dysuria, frequency and hematuria.   Neurological: Negative for focal weakness, headaches, numbness and seizures.   Psychiatric/Behavioral: Negative.  Negative for suicidal ideas.   Allergic/Immunologic: Positive for environmental allergies.   All other systems reviewed and are negative.    Vitals:    18 0831   BP: 123/82   BP Location: Right arm   Patient Position: Sitting   Pulse: 77   Temp: 98 °F (36.7 °C)   SpO2: 98%   Weight: 93 kg (205 lb)   Height: 165.1 cm (65\")      Physical Exam:  Gen - NAD, pleasant, cooperative  CV - Regular rate and rhythm, no murmur gallop or rub  Pulm - Lungs clear to auscultation without wheeze or rhonchi   GI - Soft, normoactive bowel sounds, non-tender  Ext - Without edema   Incision - Well healed, no evidence of incisional dehiscence or cellulitis  Lymph - No palpable axillary, supraclavicular or anterior chain lymphadenopathy present     Labs/Imagin18 CT chest with contrast  Radiation seeds " identified near the left adrenal gland with improvement seen in the postoperative changes with minimal fluid in the left upper quadrant.  Changes within the left upper quadrant are improving.  The chest is stable with no evidence of recurrent disease.    Assessment/Plan:  Patient is a 78-year-old  female with history of hypertension, hypercholesterolemia, stage IB poorly differentiated adenocarcinoma status post left upper lobectomy and stage IB moderately differentiated adenocarcinoma status post right upper lobectomy who presents to office today for 3 month follow-up and to discuss results of recent CT chest scan.  Patient underwent CyberKnife therapy ×3 with Dr. Martinez approximately 2 weeks ago, and tolerated the procedures without difficulty.  The patient will be seen by her oncologist, Dr. Washburn, tomorrow.  And she will follow Dr. Low on 9/6/18.  I reviewed the patient's most recent CT scan of the chest, which shows no evidence of recurrent disease.  I would like for the patient to follow-up with our office in 6 months with CT scan of the chest.  If she develops any acutely worsening symptoms, she should call our office or present to the nearest emergency department immediately.  The patient should call our office with any questions or concerns, should any arise during the interval.        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

## 2018-06-20 ENCOUNTER — OFFICE VISIT (OUTPATIENT)
Dept: ONCOLOGY | Facility: CLINIC | Age: 78
End: 2018-06-20

## 2018-06-20 VITALS
WEIGHT: 206 LBS | SYSTOLIC BLOOD PRESSURE: 130 MMHG | BODY MASS INDEX: 34.32 KG/M2 | HEART RATE: 73 BPM | TEMPERATURE: 97.2 F | HEIGHT: 65 IN | DIASTOLIC BLOOD PRESSURE: 61 MMHG | RESPIRATION RATE: 19 BRPM

## 2018-06-20 DIAGNOSIS — C79.72 MALIGNANT NEOPLASM METASTATIC TO LEFT ADRENAL GLAND (HCC): Primary | ICD-10-CM

## 2018-06-20 PROCEDURE — 99214 OFFICE O/P EST MOD 30 MIN: CPT | Performed by: INTERNAL MEDICINE

## 2018-06-20 NOTE — PROGRESS NOTES
DATE OF VISIT: 6/20/2018    REASON FOR VISIT: Followup for:  A. Stage IB, [id number], poorly differentiated adenocarcinoma of the lung.  B. Stage IB [id number]  right upper lobe poorly differentiated adenocarcinoma  C. Isolated left adrenal metastases: Status post left adrenalectomy February 1, 2018, followed by CyberKnife radiation treatment.    HISTORY OF PRESENT ILLNESS: The patient is a very pleasant 76-year-old female with past medical history significant for poorly differentiated adenocarcinoma of the lung, [id number], tumor size 3.2 cm in the left upper lobe. The patient is status post left upper lobe resection with mediastinal lymph node sampling done by Dr. Jeremy Herrera on 08/26/2015. The patient is here today in scheduled followup visit.    SUBJECTIVE: The patient is here today with her .  She is been doing fairly well.  She was able to tolerate 3 doses of CyberKnife to the left adrenal gland tumor bed without any difficulties.  Denied any weight loss no abdominal pain no back pain.     REVIEW OF SYSTEMS: All the other systems are reviewed by me and negative  except what is mentioned in HPI and subjectives.     PAST MEDICAL HISTORY/SOCIAL HISTORY/FAMILY HISTORY: Unchanged from my prior  documentation done on 08/13/2015.      Current Outpatient Prescriptions:   •  Ascorbic Acid (VITAMIN C PO), Take 1,000 mg by mouth Daily., Disp: , Rfl:   •  aspirin 81 MG tablet, Take 81 mg by mouth Daily. Last dose 3-29-18, Disp: , Rfl:   •  Cholecalciferol (VITAMIN D3 PO), Take 5,000 Units by mouth Daily., Disp: , Rfl:   •  CRANBERRY PO, Take 1,000 mg by mouth Daily. 2, 500MG CAPSULES DAILY, Disp: , Rfl:   •  docusate sodium (COLACE) 100 MG capsule, Take 100 mg by mouth Daily., Disp: , Rfl:   •  ezetimibe (ZETIA) 10 MG tablet, Take 10 mg by mouth Every Night., Disp: , Rfl:   •  guaiFENesin (MUCINEX) 600 MG 12 hr tablet, Take 1,200 mg by mouth 2 (Two) Times a Day., Disp: , Rfl:   •  loratadine (CLARITIN) 10 MG tablet, Take 10  "mg by mouth Daily., Disp: , Rfl:   •  mometasone-formoterol (DULERA 100) 100-5 MCG/ACT inhaler, Inhale 2 puffs Daily As Needed (SHORTNESS OF AIR)., Disp: , Rfl:   •  montelukast (SINGULAIR) 10 MG tablet, Take 10 mg by mouth Daily As Needed (allergies)., Disp: , Rfl:   •  Multiple Vitamin tablet, Take 1 tablet by mouth Daily., Disp: , Rfl:   •  olmesartan-hydrochlorothiazide (BENICAR HCT) 40-12.5 MG per tablet, Take 1 tablet by mouth Daily., Disp: , Rfl:   •  ondansetron ODT (ZOFRAN ODT) 8 MG disintegrating tablet, Take one tablet one hour prior to procedure and every 8 hours as needed, Disp: 10 tablet, Rfl: 1  •  simethicone (MYLICON) 125 MG chewable tablet, Chew 125 mg Every 6 (Six) Hours As Needed for Flatulence., Disp: , Rfl:   •  simvastatin (ZOCOR) 80 MG tablet, Take 80 mg by mouth Every Night., Disp: , Rfl:   •  traMADol (ULTRAM) 50 MG tablet, Take 1 tablet by mouth Every 6 (Six) Hours As Needed for pain, Disp: 20 tablet, Rfl: 0  •  Triamcinolone Acetonide (NASACORT AQ NA), 2 sprays into each nostril Daily., Disp: , Rfl:   No current facility-administered medications for this visit.     Facility-Administered Medications Ordered in Other Visits:   •  Chlorhexidine Gluconate Cloth 2 % pads 1 application, 1 application, Topical, Q12H PRN, BLANCA Rios    PHYSICAL EXAMINATION:   /61   Pulse 73   Temp 97.2 °F (36.2 °C) (Temporal Artery )   Resp 19   Ht 165.1 cm (65\")   Wt 93.4 kg (206 lb)   BMI 34.28 kg/m²   ECOG1  GENERAL: Age appropriate. No acute distress.   HEENT: Head atraumatic, normocephalic.   NECK: Supple. No JVD. No lymphadenopathy.   LUNGS: Clear to auscultation bilaterally. No wheezing. No rhonchi.   HEART: Regular rate and rhythm. S1, S2, no murmurs.   ABDOMEN: Soft, nontender, nondistended. Bowel sounds positive. No  hepatosplenomegaly.   EXTREMITIES: No clubbing, cyanosis, +1 bilateral pedal edema.   SKIN: No rashes. No purpura.   NEUROLOGIC: Awake and oriented x3. Strength 5 out " of 5 in all muscle groups.     Hospital Outpatient Visit on 06/18/2018   Component Date Value Ref Range Status   • Creatinine 06/18/2018 1.20  0.60 - 1.30 mg/dL Final    Serial Number: [id number]:  [id number]      Ct Chest With Contrast    Result Date: 6/18/2018  Narrative: EXAMINATION: CT CHEST W CONTRAST-  INDICATION: C34.12-Malignant neoplasm of upper lobe, left bronchus or lung; followup lung cancer.  TECHNIQUE: Multiple axial CT imaging was obtained of the chest from the thoracic inlet through the adrenal glands following the administration of intravenous contrast.  The radiation dose reduction device was turned on for each scan per the ALARA (As Low as Reasonably Achievable) protocol.  COMPARISON: 04/30/2018.  FINDINGS: Thyroid is homogeneous in appearance. Vascular calcification is seen scattered throughout the thoracic aorta as well as the coronary vessels. Cardiac chambers are within normal limits.  No pericardial effusion. Surgical clips are seen in the left hilar region. There is scarring at the lung apices. Chronic interstitial change is seen in the periphery of the upper and midlungs. There is no evidence of local recurrence of metastatic disease. No parenchymal consolidation, pulmonary mass or nodule. There is no pleural effusion or pneumothorax. Degenerative change is seen within the spine. Visualized upper abdomen is grossly unremarkable in appearance. The left kidney is atrophic. There is a nonobstructing stone in the right kidney. There are radiation seeds seen near the left adrenal.  A small amount of fluid is seen surrounding the spleen. Previously noted postoperative changes in the left upper quadrant are improving.      Impression: Radiation seeds identified near the left adrenal gland with improvement seen in the postoperative changes with minimal fluid in the left upper quadrant. Changes within the left upper quadrant are improving. The chest is stable with no evidence of recurrent disease.   D:  06/18/2018 E:  06/18/2018  This report was finalized on 6/18/2018 10:19 AM by Dr. Sabi Zabala MD.      Ct Guidance For Fiducial Placement    Result Date: 5/21/2018  Narrative: EXAMINATION: CT GUIDANCE FOR FIDUCIAL PLACEMENT-05/21/2018:  INDICATION: Metastatic lung cancer to left adrenal gland; C34.90-Malignant neoplasm of unspecified part of unspecified bronchus or lung.  TECHNIQUE: CT without intravenous contrast administration for fiducial placement.  The radiation dose reduction device was turned on for each scan per the ALARA (As Low as Reasonably Achievable) protocol.  COMPARISON: CT abdomen and pelvis dated 04/30/2018.  FINDINGS: Postsurgical changes involving the right adrenal gland with right adrenal nodule removed. Minimal adrenal tissue remains.  The patient was referred for CT-guided left adrenal fiducial placement. Informed consent was obtained and signed. The patient was placed in prone positioning. The patient was prepped and draped in typical sterile fashion. 1% lidocaine used for anesthetic of the paraspinal soft tissues with spinal needle used for deeper numbing to the level of the peritoneal reflection. Under meticulous CT guidance a 19-gauge introducer catheter and needle placed to the level of the residual left adrenal gland with subsequent placement of 3 separate fiducial markers within the area. The patient tolerated the procedure well with only minimal stranding and retroperitoneal hematoma formation on removal of introducer catheter. This was stable on subsequent delayed imaging.      Impression: Satisfactory CT-guided fiducial marker placements left adrenal gland.  D:  05/21/2018 E:  05/21/2018    This report was finalized on 5/21/2018 4:56 PM by Dr. Noble Harrell.    (    ASSESSMENT: The patient is a very pleasant 76-year-old female with stage IB  adenocarcinoma of the lung.    PROBLEM LIST:  1. Stage IB poorly differentiated adenocarcinoma of the lung, [id number]:  A. Diagnosed  08/03/2015 after CT-guided biopsy.  B. Status post left upper lobe resection with mediastinal lymph node sampling  done by Dr. Herrera on 08/26/2015.  C. New right upper lobe lung nodule with left adrenal nodule, both were hypermetabolic active on PET scan done on November 6, 2017  2.  Stage IB right upper lobe adenocarcinoma [id number]:  A. presented with hypermetabolic nodule on a screening PET scan.  B.  Status post surgical resection with a clean margins done by Dr. Herrera November 30 2017  C.  Final pathology revealed 1.7 adenocarcinoma with pleural involvement negative hilar and mediastinal nodes and clear surgical margins  3.  Isolated left adrenal metastases:  A.  Status post left adrenalectomy done on February 1, 2018  B. Pathology report revealed metastatic adenocarcinoma lung primary with positive margin  C. Status post CyberKnife radiation treatment completed Traci 15, 2018  4.  Hypertension  5.  Hypercholesterolemia  6.  Heartburn    PLAN:  1.  I did go over the CAT scan chest result with the patient and her , I reassured her is no evidence of residual or recurrent disease.  2.  She will have repeat CBC and CMP prior to return.  3.  I will order repeat CT chest with contrast prior to return.  4.  The patient will follow-up with me in 3 months.  5.  We'll continue hydrochlorothiazide with Benicar for hypertension  6.  Continue simvastatin for hypercholesterolemia  6.  We'll continue omeprazole 20 mg daily for heartburn    Luisana Washburn MD  6/20/2018

## 2018-09-17 ENCOUNTER — HOSPITAL ENCOUNTER (OUTPATIENT)
Dept: CT IMAGING | Facility: HOSPITAL | Age: 78
Discharge: HOME | End: 2018-09-17
Attending: INTERNAL MEDICINE | Admitting: INTERNAL MEDICINE

## 2018-09-17 DIAGNOSIS — C79.72 MALIGNANT NEOPLASM METASTATIC TO LEFT ADRENAL GLAND (HCC): ICD-10-CM

## 2018-09-17 LAB — CREAT BLDA-MCNC: 1.2 MG/DL (ref 0.6–1.3)

## 2018-09-17 PROCEDURE — 74177 CT ABD & PELVIS W/CONTRAST: CPT

## 2018-09-17 PROCEDURE — 82565 ASSAY OF CREATININE: CPT

## 2018-09-17 PROCEDURE — 71260 CT THORAX DX C+: CPT

## 2018-09-17 PROCEDURE — 0 IOPAMIDOL PER 1 ML: Performed by: INTERNAL MEDICINE

## 2018-09-17 RX ADMIN — IOPAMIDOL 50 ML: 755 INJECTION, SOLUTION INTRAVENOUS at 08:08

## 2018-09-19 ENCOUNTER — OFFICE VISIT (OUTPATIENT)
Dept: ONCOLOGY | Facility: CLINIC | Age: 78
End: 2018-09-19

## 2018-09-19 VITALS
BODY MASS INDEX: 34.66 KG/M2 | WEIGHT: 208 LBS | DIASTOLIC BLOOD PRESSURE: 65 MMHG | HEIGHT: 65 IN | HEART RATE: 92 BPM | SYSTOLIC BLOOD PRESSURE: 125 MMHG | TEMPERATURE: 97.3 F | RESPIRATION RATE: 22 BRPM

## 2018-09-19 DIAGNOSIS — C79.72 MALIGNANT NEOPLASM METASTATIC TO LEFT ADRENAL GLAND (HCC): Primary | ICD-10-CM

## 2018-09-19 PROCEDURE — 99214 OFFICE O/P EST MOD 30 MIN: CPT | Performed by: INTERNAL MEDICINE

## 2018-09-19 NOTE — PROGRESS NOTES
DATE OF VISIT: 9/19/2018    REASON FOR VISIT: Followup for:  A. Stage IB, [id number], poorly differentiated adenocarcinoma of the lung.  B. Stage IB [id number]  right upper lobe poorly differentiated adenocarcinoma  C. Isolated left adrenal metastases: Status post left adrenalectomy February 1, 2018, followed by CyberKnife radiation treatment.    HISTORY OF PRESENT ILLNESS: The patient is a very pleasant 76-year-old female with past medical history significant for poorly differentiated adenocarcinoma of the lung, [id number], tumor size 3.2 cm in the left upper lobe. The patient is status post left upper lobe resection with mediastinal lymph node sampling done by Dr. Jeremy Herrera on 08/26/2015. The patient is here today in scheduled followup visit.    SUBJECTIVE: The patient is here today with her .  She is been doing fairly well.  She is anxious about the scan results.  Denied any weight loss no abdominal pain no back pain.     REVIEW OF SYSTEMS: All the other systems are reviewed by me and negative  except what is mentioned in HPI and subjectives.     PAST MEDICAL HISTORY/SOCIAL HISTORY/FAMILY HISTORY: Unchanged from my prior  documentation done on 08/13/2015.      Current Outpatient Prescriptions:   •  Ascorbic Acid (VITAMIN C PO), Take 1,000 mg by mouth Daily., Disp: , Rfl:   •  aspirin 81 MG tablet, Take 81 mg by mouth Daily. Last dose 3-29-18, Disp: , Rfl:   •  Cholecalciferol (VITAMIN D3 PO), Take 5,000 Units by mouth Daily., Disp: , Rfl:   •  CRANBERRY PO, Take 1,000 mg by mouth Daily. 2, 500MG CAPSULES DAILY, Disp: , Rfl:   •  docusate sodium (COLACE) 100 MG capsule, Take 100 mg by mouth Daily., Disp: , Rfl:   •  ezetimibe (ZETIA) 10 MG tablet, Take 10 mg by mouth Every Night., Disp: , Rfl:   •  guaiFENesin (MUCINEX) 600 MG 12 hr tablet, Take 1,200 mg by mouth 2 (Two) Times a Day., Disp: , Rfl:   •  loratadine (CLARITIN) 10 MG tablet, Take 10 mg by mouth Daily., Disp: , Rfl:   •  mometasone-formoterol (DULERA  "100) 100-5 MCG/ACT inhaler, Inhale 2 puffs Daily As Needed (SHORTNESS OF AIR)., Disp: , Rfl:   •  montelukast (SINGULAIR) 10 MG tablet, Take 10 mg by mouth Daily As Needed (allergies)., Disp: , Rfl:   •  Multiple Vitamin tablet, Take 1 tablet by mouth Daily., Disp: , Rfl:   •  olmesartan-hydrochlorothiazide (BENICAR HCT) 40-12.5 MG per tablet, Take 1 tablet by mouth Daily., Disp: , Rfl:   •  ondansetron ODT (ZOFRAN ODT) 8 MG disintegrating tablet, Take one tablet one hour prior to procedure and every 8 hours as needed, Disp: 10 tablet, Rfl: 1  •  simethicone (MYLICON) 125 MG chewable tablet, Chew 125 mg Every 6 (Six) Hours As Needed for Flatulence., Disp: , Rfl:   •  simvastatin (ZOCOR) 80 MG tablet, Take 80 mg by mouth Every Night., Disp: , Rfl:   •  traMADol (ULTRAM) 50 MG tablet, Take 1 tablet by mouth Every 6 (Six) Hours As Needed for pain, Disp: 20 tablet, Rfl: 0  •  Triamcinolone Acetonide (NASACORT AQ NA), 2 sprays into each nostril Daily., Disp: , Rfl:   No current facility-administered medications for this visit.     Facility-Administered Medications Ordered in Other Visits:   •  Chlorhexidine Gluconate Cloth 2 % pads 1 application, 1 application, Topical, Q12H PRN, Bonita Loomis PA    PHYSICAL EXAMINATION:   /65   Pulse 92   Temp 97.3 °F (36.3 °C) (Temporal Artery )   Resp 22   Ht 165.1 cm (65\")   Wt 94.3 kg (208 lb)   BMI 34.61 kg/m²   ECOG1  GENERAL: Age appropriate. No acute distress.   HEENT: Head atraumatic, normocephalic.   NECK: Supple. No JVD. No lymphadenopathy.   LUNGS: Clear to auscultation bilaterally. No wheezing. No rhonchi.   HEART: Regular rate and rhythm. S1, S2, no murmurs.   ABDOMEN: Soft, nontender, nondistended. Bowel sounds positive. No  hepatosplenomegaly.   EXTREMITIES: No clubbing, cyanosis, +1 bilateral pedal edema.   SKIN: No rashes. No purpura.   NEUROLOGIC: Awake and oriented x3. Strength 5 out of 5 in all muscle groups.     Hospital Outpatient Visit on " 09/17/2018   Component Date Value Ref Range Status   • Creatinine 09/17/2018 1.20  0.60 - 1.30 mg/dL Final    Serial Number: [id number]:  [id number]      Ct Chest With Contrast    Result Date: 9/17/2018  Narrative: EXAMINATION: CT CHEST W CONTRAST-, CT ABDOMEN PELVIS W CONTRAST- 09/17/2018  INDICATION: C79.72-Secondary malignant neoplasm of left adrenal gland  TECHNIQUE:  Axial CT data of the chest, abdomen and pelvis were acquired helically following IV contrast administration. Multiplanar reformatted images were generated and reviewed. The radiation dose reduction device was turned on for each scan per the ALARA (As Low as Reasonably Achievable) protocol  COMPARISONS:  CT chest 06/18/2018, CT abdomen/pelvis 04/30/2018, PET/CT 02/23/2018  FINDINGS:   Chest: Status post right upper lobectomy with associated shift of mediastinal and fissural structures. Minimal peripheral reticular opacity is again noted possibly related to dependent atelectasis but early interstitial disease such as fibrosis also should be considered. There is background emphysema, mild in degree. The airways are patent centrally; minimal debris noted in the right mainstem bronchus. The size of the heart is within normal limits. There is three-vessel coronary artery calcifications. There is no noncalcified lung nodule or concerning lymph node. Chest wall soft tissues and the bony thoracic cage are unremarkable.  Abdomen/pelvis: Postoperative changes are seen involving the left adrenal gland. There is a cyst in the left lobe of the liver. There is smooth parenchymal atrophy of the left kidney. There is a nonobstructing 3 mm right renal calculus. The gallbladder, right adrenal gland, the pancreas and spleen are unremarkable. Status post hysterectomy. No dilated small or large bowel. Diverticulosis noted without evidence of acute diverticulitis. Extensive aortoiliac atherosclerosis. Body wall soft tissues are unremarkable. Lumbar spine and bony pelvis  are without aggressive bone lesion.      Impression: No evidence of metastatic disease or progression in the chest, abdomen or pelvis.  D:  09/17/2018 E:  09/17/2018  This report was finalized on 9/17/2018 10:13 AM by Rodolfo Elise.      Ct Abdomen Pelvis With Contrast    Result Date: 9/17/2018  Narrative: EXAMINATION: CT CHEST W CONTRAST-, CT ABDOMEN PELVIS W CONTRAST- 09/17/2018  INDICATION: C79.72-Secondary malignant neoplasm of left adrenal gland  TECHNIQUE:  Axial CT data of the chest, abdomen and pelvis were acquired helically following IV contrast administration. Multiplanar reformatted images were generated and reviewed. The radiation dose reduction device was turned on for each scan per the ALARA (As Low as Reasonably Achievable) protocol  COMPARISONS:  CT chest 06/18/2018, CT abdomen/pelvis 04/30/2018, PET/CT 02/23/2018  FINDINGS:   Chest: Status post right upper lobectomy with associated shift of mediastinal and fissural structures. Minimal peripheral reticular opacity is again noted possibly related to dependent atelectasis but early interstitial disease such as fibrosis also should be considered. There is background emphysema, mild in degree. The airways are patent centrally; minimal debris noted in the right mainstem bronchus. The size of the heart is within normal limits. There is three-vessel coronary artery calcifications. There is no noncalcified lung nodule or concerning lymph node. Chest wall soft tissues and the bony thoracic cage are unremarkable.  Abdomen/pelvis: Postoperative changes are seen involving the left adrenal gland. There is a cyst in the left lobe of the liver. There is smooth parenchymal atrophy of the left kidney. There is a nonobstructing 3 mm right renal calculus. The gallbladder, right adrenal gland, the pancreas and spleen are unremarkable. Status post hysterectomy. No dilated small or large bowel. Diverticulosis noted without evidence of acute diverticulitis. Extensive  aortoiliac atherosclerosis. Body wall soft tissues are unremarkable. Lumbar spine and bony pelvis are without aggressive bone lesion.      Impression: No evidence of metastatic disease or progression in the chest, abdomen or pelvis.  D:  09/17/2018 E:  09/17/2018  This report was finalized on 9/17/2018 10:13 AM by Rodolfo Elise.    (    ASSESSMENT: The patient is a very pleasant 76-year-old female with stage IB  adenocarcinoma of the lung.    PROBLEM LIST:  1. Stage IB poorly differentiated adenocarcinoma of the lung, [id number]:  A. Diagnosed 08/03/2015 after CT-guided biopsy.  B. Status post left upper lobe resection with mediastinal lymph node sampling  done by Dr. Herrera on 08/26/2015.  C. New right upper lobe lung nodule with left adrenal nodule, both were hypermetabolic active on PET scan done on November 6, 2017  2.  Stage IB right upper lobe adenocarcinoma [id number]:  A. presented with hypermetabolic nodule on a screening PET scan.  B.  Status post surgical resection with a clean margins done by Dr. Herrera November 30 2017  C.  Final pathology revealed 1.7 adenocarcinoma with pleural involvement negative hilar and mediastinal nodes and clear surgical margins  3.  Isolated left adrenal metastases:  A.  Status post left adrenalectomy done on February 1, 2018  B. Pathology report revealed metastatic adenocarcinoma lung primary with positive margin  C. Status post CyberKnife radiation treatment completed Traci 15, 2018  4.  Hypertension  5.  Hypercholesterolemia  6.  Heartburn    PLAN:  1.  I did go over the CAT scan chest result with the patient and her , I reassured her is no evidence of residual or recurrent disease.   2.  She will have repeat CBC and CMP prior to return.  3.  I will order repeat CT chest with contrast prior to return.  4.  The patient will follow-up with me in 4 months.  5.  We'll continue hydrochlorothiazide with Benicar for hypertension  6.  Continue simvastatin for hypercholesterolemia  6.   We'll continue omeprazole 20 mg daily for heartburn    Luisana Washburn MD  9/19/2018

## 2018-09-20 ENCOUNTER — HOSPITAL ENCOUNTER (OUTPATIENT)
Dept: RADIATION ONCOLOGY | Facility: HOSPITAL | Age: 78
Setting detail: RADIATION/ONCOLOGY SERIES
Discharge: HOME | End: 2018-09-20

## 2018-09-20 ENCOUNTER — OFFICE VISIT (OUTPATIENT)
Dept: RADIATION ONCOLOGY | Facility: HOSPITAL | Age: 78
End: 2018-09-20

## 2018-09-20 VITALS
TEMPERATURE: 96.5 F | DIASTOLIC BLOOD PRESSURE: 74 MMHG | SYSTOLIC BLOOD PRESSURE: 149 MMHG | BODY MASS INDEX: 34.45 KG/M2 | HEART RATE: 78 BPM | RESPIRATION RATE: 20 BRPM | OXYGEN SATURATION: 93 % | WEIGHT: 207 LBS

## 2018-09-20 DIAGNOSIS — C79.72 MALIGNANT NEOPLASM METASTATIC TO LEFT ADRENAL GLAND (HCC): Primary | ICD-10-CM

## 2018-09-20 PROCEDURE — G0463 HOSPITAL OUTPT CLINIC VISIT: HCPCS

## 2018-09-20 NOTE — PROGRESS NOTES
FOLLOW UP NOTE    PATIENT:                                                      America Nix  MEDICAL RECORD #:                        [id number]  :                                                          1940  COMPLETION DATE:   18  DIAGNOSIS:   Malignant neoplasm to left adrenal gland       BRIEF HISTORY:    Routine follow-up visit.  She has a history of resected adenocarcinoma lung with solitary incompletely resected left adrenal metastasis.    Adrenal tumor bed and residual mass was treated with CyberKnife stereotactic body radiotherapy.  She tolerated SBRT extremely well with essentially no symptoms or complaints.  Recent surveillance CT imaging chest, abdomen and pelvis show no evidence of disease recurrence.    MEDICATIONS: Medication reconciliation for the patient was reviewed and confirmed in the electronic medical record.    Review of Systems   Constitutional: Negative.    HENT:  Negative.    Eyes: Negative.    Respiratory: Negative.    Cardiovascular: Negative.    Gastrointestinal: Negative.    Endocrine: Negative.    Genitourinary: Negative.     Musculoskeletal: Negative.    Skin: Negative.    Neurological: Negative.    Hematological: Negative.    Psychiatric/Behavioral: Negative.        KPS 90%    Physical Exam   Constitutional: She is oriented to person, place, and time. She appears well-developed and well-nourished.   HENT:   Head: Normocephalic and atraumatic.   Neck: Normal range of motion. Neck supple.   Cardiovascular: Normal rate, regular rhythm and normal heart sounds.    No murmur heard.  Pulmonary/Chest: Effort normal and breath sounds normal. She has no wheezes. She has no rales.   Abdominal: Soft. Bowel sounds are normal. She exhibits no distension. There is no hepatosplenomegaly. There is no tenderness.   Musculoskeletal: Normal range of motion. She exhibits no edema or tenderness.   Lymphadenopathy:     She has no cervical adenopathy.     She has no axillary adenopathy.         Right: No supraclavicular adenopathy present.        Left: No supraclavicular adenopathy present.   Neurological: She is alert and oriented to person, place, and time. She has normal strength. No sensory deficit.   Skin: Skin is warm and dry.   Psychiatric: She has a normal mood and affect. Her behavior is normal. Judgment and thought content normal.   Nursing note and vitals reviewed.      VITAL SIGNS:   Vitals:    09/20/18 1005   BP: 149/74   Pulse: 78   Resp: 20   Temp: 96.5 °F (35.8 °C)   TempSrc: Temporal Artery    SpO2: 93%   Weight: 93.9 kg (207 lb)   PainSc: 0-No pain       The following portions of the patient's history were reviewed and updated as appropriate: allergies, current medications, past family history, past medical history, past social history, past surgical history and problem list.         America was seen today for prostate cancer.    Diagnoses and all orders for this visit:    Malignant neoplasm metastatic to left adrenal gland (CMS/HCC)         IMPRESSION:  Oligometastatic lung cancer, in remission after surgical resections and SBRT to the residual left adrenal metastasis.  She tolerated treatment very well.    RECOMMENDATIONS:  She plans to continue surveillance under the care of her oncologist, Dr. Washburn.    Return if symptoms worsen or fail to improve.    Craig Martinez MD    Errors in dictation may reflect use of voice recognition software and not all errors in transcription may have been detected prior to signing.

## 2018-12-13 ENCOUNTER — TELEPHONE (OUTPATIENT)
Dept: CARDIAC SURGERY | Facility: CLINIC | Age: 78
End: 2018-12-13

## 2018-12-13 DIAGNOSIS — C34.90 MALIGNANT NEOPLASM OF LUNG, UNSPECIFIED LATERALITY, UNSPECIFIED PART OF LUNG (HCC): Primary | ICD-10-CM

## 2018-12-13 NOTE — TELEPHONE ENCOUNTER
PT RECEIVED RECALL LETTER PER [facility] FOR F/U APPT, PT CLAIMED THAT SHE IS HAVING A TEST DONE ON 1/16 AND AN APPT WITH DR. SIBLEY ON 1/23 SHE WOULD PREFER TO HAVE APPTS NOT ON THE SAME DAY FOR [facility] AS DR. MÉNDEZ. PT VERIFIED PHONE NUMBER, ADDRESS, AND INSURANCE

## 2019-01-14 ENCOUNTER — HOSPITAL ENCOUNTER (OUTPATIENT)
Dept: CT IMAGING | Facility: HOSPITAL | Age: 79
Discharge: HOME | End: 2019-01-14
Admitting: INTERNAL MEDICINE

## 2019-01-14 DIAGNOSIS — C34.90 MALIGNANT NEOPLASM OF LUNG, UNSPECIFIED LATERALITY, UNSPECIFIED PART OF LUNG (HCC): ICD-10-CM

## 2019-01-14 DIAGNOSIS — C79.72 MALIGNANT NEOPLASM METASTATIC TO LEFT ADRENAL GLAND (HCC): ICD-10-CM

## 2019-01-14 LAB — CREAT BLDA-MCNC: 1.4 MG/DL (ref 0.6–1.3)

## 2019-01-14 PROCEDURE — 82565 ASSAY OF CREATININE: CPT

## 2019-01-14 PROCEDURE — 71270 CT THORAX DX C-/C+: CPT

## 2019-01-14 PROCEDURE — 25010000002 IOPAMIDOL 61 % SOLUTION: Performed by: INTERNAL MEDICINE

## 2019-01-14 PROCEDURE — 74177 CT ABD & PELVIS W/CONTRAST: CPT

## 2019-01-14 RX ADMIN — IOPAMIDOL 50 ML: 612 INJECTION, SOLUTION INTRAVENOUS at 09:10

## 2019-01-23 ENCOUNTER — OFFICE VISIT (OUTPATIENT)
Dept: ONCOLOGY | Facility: CLINIC | Age: 79
End: 2019-01-23

## 2019-01-23 VITALS
HEIGHT: 65 IN | RESPIRATION RATE: 19 BRPM | SYSTOLIC BLOOD PRESSURE: 135 MMHG | BODY MASS INDEX: 35.16 KG/M2 | DIASTOLIC BLOOD PRESSURE: 68 MMHG | TEMPERATURE: 97.1 F | HEART RATE: 83 BPM | WEIGHT: 211 LBS

## 2019-01-23 DIAGNOSIS — C34.12 CANCER OF UPPER LOBE OF LEFT LUNG (HCC): Primary | ICD-10-CM

## 2019-01-23 PROCEDURE — 99214 OFFICE O/P EST MOD 30 MIN: CPT | Performed by: INTERNAL MEDICINE

## 2019-01-23 NOTE — PROGRESS NOTES
DATE OF VISIT: 1/23/2019    REASON FOR VISIT: Followup for:  A. Stage IB, [id number], poorly differentiated adenocarcinoma of the lung.  B. Stage IB [id number]  right upper lobe poorly differentiated adenocarcinoma  C. Isolated left adrenal metastases: Status post left adrenalectomy February 1, 2018, followed by CyberKnife radiation treatment.    HISTORY OF PRESENT ILLNESS: The patient is a very pleasant 76-year-old female with past medical history significant for poorly differentiated adenocarcinoma of the lung, [id number], tumor size 3.2 cm in the left upper lobe. The patient is status post left upper lobe resection with mediastinal lymph node sampling done by Dr. Jeremy Herrera on 08/26/2015. The patient is here today in scheduled followup visit.    SUBJECTIVE: The patient is here today with her .  She continues to do well.  She denied any fever or chills no night sweats.  Denied any diarrhea.  No abdominal pain.     REVIEW OF SYSTEMS: All the other systems are reviewed by me and negative  except what is mentioned in HPI and subjectives.     PAST MEDICAL HISTORY/SOCIAL HISTORY/FAMILY HISTORY: Unchanged from my prior  documentation done on 08/13/2015.      Current Outpatient Medications:   •  Ascorbic Acid (VITAMIN C PO), Take 1,000 mg by mouth Daily., Disp: , Rfl:   •  aspirin 81 MG tablet, Take 81 mg by mouth Daily. Last dose 3-29-18, Disp: , Rfl:   •  Cholecalciferol (VITAMIN D3 PO), Take 5,000 Units by mouth Daily., Disp: , Rfl:   •  CRANBERRY PO, Take 1,000 mg by mouth Daily. 2, 500MG CAPSULES DAILY, Disp: , Rfl:   •  docusate sodium (COLACE) 100 MG capsule, Take 100 mg by mouth Daily., Disp: , Rfl:   •  ezetimibe (ZETIA) 10 MG tablet, Take 10 mg by mouth Every Night., Disp: , Rfl:   •  guaiFENesin (MUCINEX) 600 MG 12 hr tablet, Take 1,200 mg by mouth 2 (Two) Times a Day., Disp: , Rfl:   •  loratadine (CLARITIN) 10 MG tablet, Take 10 mg by mouth Daily., Disp: , Rfl:   •  mometasone-formoterol (DULERA 100) 100-5  "MCG/ACT inhaler, Inhale 2 puffs Daily As Needed (SHORTNESS OF AIR)., Disp: , Rfl:   •  montelukast (SINGULAIR) 10 MG tablet, Take 10 mg by mouth Daily As Needed (allergies)., Disp: , Rfl:   •  Multiple Vitamin tablet, Take 1 tablet by mouth Daily., Disp: , Rfl:   •  olmesartan-hydrochlorothiazide (BENICAR HCT) 40-12.5 MG per tablet, Take 1 tablet by mouth Daily., Disp: , Rfl:   •  simethicone (MYLICON) 125 MG chewable tablet, Chew 125 mg Every 6 (Six) Hours As Needed for Flatulence., Disp: , Rfl:   •  simvastatin (ZOCOR) 80 MG tablet, Take 80 mg by mouth Every Night., Disp: , Rfl:   •  Triamcinolone Acetonide (NASACORT AQ NA), 2 sprays into each nostril Daily., Disp: , Rfl:   No current facility-administered medications for this visit.     Facility-Administered Medications Ordered in Other Visits:   •  Chlorhexidine Gluconate Cloth 2 % pads 1 application, 1 application, Topical, Q12H PRN, Bonita Loomis PA    PHYSICAL EXAMINATION:   /68   Pulse 83   Temp 97.1 °F (36.2 °C) (Temporal)   Resp 19   Ht 165.1 cm (65\")   Wt 95.7 kg (211 lb)   BMI 35.11 kg/m²   ECOG1  GENERAL: Age appropriate. No acute distress.   HEENT: Head atraumatic, normocephalic.   NECK: Supple. No JVD. No lymphadenopathy.   LUNGS: Clear to auscultation bilaterally. No wheezing. No rhonchi.   HEART: Regular rate and rhythm. S1, S2, no murmurs.   ABDOMEN: Soft, nontender, nondistended. Bowel sounds positive. No  hepatosplenomegaly.   EXTREMITIES: No clubbing, cyanosis, +1 bilateral pedal edema.   SKIN: No rashes. No purpura.   NEUROLOGIC: Awake and oriented x3. Strength 5 out of 5 in all muscle groups.     Hospital Outpatient Visit on 01/14/2019   Component Date Value Ref Range Status   • Creatinine 01/14/2019 1.40* 0.60 - 1.30 mg/dL Final    Serial Number: [id number]:  [id number]      Ct Chest With & Without Contrast    Result Date: 1/14/2019  Narrative: EXAMINATION: CT CHEST W WO CONTRAST-, CT ABDOMEN AND PELVIS W CONTRAST-01/14/2019: "  INDICATION: LUNG CANCER; C34.90-Malignant neoplasm of unspecified part of unspecified bronchus or lung.  TECHNIQUE: CT chest with and without intravenous contrast, CT abdomen and pelvis with intravenous contrast administration.  The radiation dose reduction device was turned on for each scan per the ALARA (As Low as Reasonably Achievable) protocol.  COMPARISON: CT 09/17/2018.  FINDINGS:  CHEST: The thyroid is homogeneous in attenuation. No bulky mediastinal adenopathy. Central airways are patent. Esophagus in normal course with mildly patulous appearance. Atherosclerotic nonaneurysmal thoracic aorta with patent great vessel origins. Coronary calcifications are noted as well as aortic valvular calcifications and mitral valve calcifications. No pericardial effusion. Extended lung windows demonstrate subpleural reticulation within the posterior portion right lower lobe, however, no focal consolidation or dominant nodule/mass. No significant pleural effusion. Asymmetric elevation of the left hemidiaphragm again noted and similar to prior. Degenerative changes of the spine without aggressive osseous or soft tissue body wall lesions of concern.  CT ABDOMEN AND PELVIS: The liver demonstrates stable subcentimeter low-attenuation focus within the left hepatic lobe consistent with hepatic cyst. Mild-to-moderate diffuse low attenuation indicating hepatic steatosis similar to prior. Gallbladder unremarkable. No intrahepatic or extrahepatic biliary dilatation. Pancreas and spleen grossly unremarkable. Right adrenal without distinct nodule. Left adrenalectomy unchanged in appearance without soft tissue mass recurrence. Severely atrophic left kidney parenchyma with minimal perinephric stranding similar to prior, however, no evidence for hydronephrosis. Right kidney without hydronephrosis or hydroureter demonstrating 1-2 mm nonobstructing right renal calculus unchanged from prior. Atherosclerotic, nonaneurysmal, patent abdominal  aorta. Portal veins and IVC patent. GI tract evaluation without focal thickening or disproportionate dilatation of bowel. Sigmoid diverticulosis without evidence for acute diverticulitis. No free fluid or intra-abdominal free air. The pelvic viscera are grossly unremarkable without bulky pelvic adenopathy or free fluid. Degenerative changes of the spine without aggressive osseous or soft tissue body wall lesions of concern.      Impression: Stable appearance of the chest, abdomen and pelvis without evidence for metastatic disease or progression.  D:  01/14/2019 E:  01/14/2019    This report was finalized on 1/14/2019 1:22 PM by Dr. Noble Harrell.      Ct Abdomen Pelvis With Contrast    Result Date: 1/14/2019  Narrative: EXAMINATION: CT CHEST W WO CONTRAST-, CT ABDOMEN AND PELVIS W CONTRAST-01/14/2019:  INDICATION: LUNG CANCER; C34.90-Malignant neoplasm of unspecified part of unspecified bronchus or lung.  TECHNIQUE: CT chest with and without intravenous contrast, CT abdomen and pelvis with intravenous contrast administration.  The radiation dose reduction device was turned on for each scan per the ALARA (As Low as Reasonably Achievable) protocol.  COMPARISON: CT 09/17/2018.  FINDINGS:  CHEST: The thyroid is homogeneous in attenuation. No bulky mediastinal adenopathy. Central airways are patent. Esophagus in normal course with mildly patulous appearance. Atherosclerotic nonaneurysmal thoracic aorta with patent great vessel origins. Coronary calcifications are noted as well as aortic valvular calcifications and mitral valve calcifications. No pericardial effusion. Extended lung windows demonstrate subpleural reticulation within the posterior portion right lower lobe, however, no focal consolidation or dominant nodule/mass. No significant pleural effusion. Asymmetric elevation of the left hemidiaphragm again noted and similar to prior. Degenerative changes of the spine without aggressive osseous or soft tissue body wall  lesions of concern.  CT ABDOMEN AND PELVIS: The liver demonstrates stable subcentimeter low-attenuation focus within the left hepatic lobe consistent with hepatic cyst. Mild-to-moderate diffuse low attenuation indicating hepatic steatosis similar to prior. Gallbladder unremarkable. No intrahepatic or extrahepatic biliary dilatation. Pancreas and spleen grossly unremarkable. Right adrenal without distinct nodule. Left adrenalectomy unchanged in appearance without soft tissue mass recurrence. Severely atrophic left kidney parenchyma with minimal perinephric stranding similar to prior, however, no evidence for hydronephrosis. Right kidney without hydronephrosis or hydroureter demonstrating 1-2 mm nonobstructing right renal calculus unchanged from prior. Atherosclerotic, nonaneurysmal, patent abdominal aorta. Portal veins and IVC patent. GI tract evaluation without focal thickening or disproportionate dilatation of bowel. Sigmoid diverticulosis without evidence for acute diverticulitis. No free fluid or intra-abdominal free air. The pelvic viscera are grossly unremarkable without bulky pelvic adenopathy or free fluid. Degenerative changes of the spine without aggressive osseous or soft tissue body wall lesions of concern.      Impression: Stable appearance of the chest, abdomen and pelvis without evidence for metastatic disease or progression.  D:  01/14/2019 E:  01/14/2019    This report was finalized on 1/14/2019 1:22 PM by Dr. Noble Harrell.    (    ASSESSMENT: The patient is a very pleasant 76-year-old female with stage IB  adenocarcinoma of the lung.    PROBLEM LIST:  1. Stage IB poorly differentiated adenocarcinoma of the lung, [id number]:  A. Diagnosed 08/03/2015 after CT-guided biopsy.  B. Status post left upper lobe resection with mediastinal lymph node sampling  done by Dr. Herrera on 08/26/2015.  C. New right upper lobe lung nodule with left adrenal nodule, both were hypermetabolic active on PET scan done on  November 6, 2017  2.  Stage IB right upper lobe adenocarcinoma [id number]:  A. presented with hypermetabolic nodule on a screening PET scan.  B.  Status post surgical resection with a clean margins done by Dr. Herrera November 30 2017  C.  Final pathology revealed 1.7 adenocarcinoma with pleural involvement negative hilar and mediastinal nodes and clear surgical margins  3.  Isolated left adrenal metastases:  A.  Status post left adrenalectomy done on February 1, 2018  B. Pathology report revealed metastatic adenocarcinoma lung primary with positive margin  C. Status post CyberKnife radiation treatment completed Traci 15, 2018  4.  Hypertension  5.  Hypercholesterolemia  6.  Heartburn    PLAN:  1.  I did go over the CAT scan chest result with the patient and her , I reassured her is no evidence of residual or recurrent disease.  I reviewed the films myself.  2.  She will have repeat CBC and CMP prior to return.  3.  I will order repeat CT chest with contrast prior to return.  4.  The patient will follow-up with us in 6 months.  5.  We'll continue hydrochlorothiazide with Benicar for hypertension  6.  Continue simvastatin for hypercholesterolemia  6.  We'll continue omeprazole 20 mg daily for heartburn    Luisana Washburn MD  1/23/2019

## 2019-02-12 ENCOUNTER — OFFICE VISIT (OUTPATIENT)
Dept: CARDIAC SURGERY | Facility: CLINIC | Age: 79
End: 2019-02-12

## 2019-02-12 VITALS
OXYGEN SATURATION: 97 % | DIASTOLIC BLOOD PRESSURE: 68 MMHG | WEIGHT: 217.6 LBS | HEIGHT: 65 IN | BODY MASS INDEX: 36.25 KG/M2 | HEART RATE: 86 BPM | TEMPERATURE: 97.5 F | SYSTOLIC BLOOD PRESSURE: 91 MMHG

## 2019-02-12 DIAGNOSIS — C34.90 PRIMARY MALIGNANT NEOPLASM OF LUNG METASTATIC TO OTHER SITE, UNSPECIFIED LATERALITY (HCC): Primary | ICD-10-CM

## 2019-02-12 PROCEDURE — 99213 OFFICE O/P EST LOW 20 MIN: CPT | Performed by: PHYSICIAN ASSISTANT

## 2019-02-12 NOTE — PROGRESS NOTES
02/12/2019  Patient Information  [facility]                                                                                          [address] [place] [address]   1940  'PCP/Referring Physician'  Hai Solomon MD  [phone]  No ref. provider found    Chief Complaint   Patient presents with   • Lung Cancer     6 month follow-up with results from CT chest       History of Present Illness:  Patient is a 79-year-old  female with history of hypertension, hypercholesterolemia, GERD, metastatic left adrenal gland malignant neoplasm and metastatic primary lung cancer who underwent left upper lobectomy performed 8/26/15 revealing stage IB adenocarcinoma and 11/30/17 right upper lobectomy revealing stage IB adenocarcinoma who presents to office for 6 month follow-up for review and discussion of recent CT scan of the chest, abdomen and pelvis.  The patient was last seen on 6/19/18 and denies any interval shortness of breath, difficulty with ambulation, weight loss or hemoptysis.  The patient is followed by her oncologist, Dr. Washburn, and was recently seen by him on 1/23/19.  Of note, the patient underwent a left adrenalectomy by Dr. Guzmán, which revealed positive margins, and was followed by Dr. Low with radiation oncology who performed CyberKnife on the patient.  The patient is otherwise doing well.    Patient Active Problem List   Diagnosis   • HTN (hypertension)   • Hypercholesteremia   • GERD (gastroesophageal reflux disease)   • Metastatic primary lung cancer (CMS/HCC)   • Malignant neoplasm metastatic to left adrenal gland (CMS/HCC)     Past Medical History:   Diagnosis Date   • Arthritis    • GERD (gastroesophageal reflux disease)    • Hypercholesteremia    • Hypertension    • Lung cancer (CMS/HCC) 2015    LEFT LUNG LOBECTOMY    • Lung cancer (CMS/HCC) 2017    RIGHT    • On home O2     2.5 L HS,    • Pneumothorax 2015    AFTER LUNG BIOPSY    • Seasonal allergies    • Wears  dentures    • Wears glasses      Past Surgical History:   Procedure Laterality Date   • ADRENALECTOMY Left 4/6/2018    Procedure: ADRENALECTOMY LAPAROSCOPIC LEFT;  Surgeon: Carol Ann Guzmán MD;  Location:  [facility];  Service: General   • BREAST BIOPSY Right    • BREAST LUMPECTOMY Left    • BRONCHOSCOPY N/A 11/30/2017    Procedure: BRONCHOSCOPY;  Surgeon: Jeremy Herrera MD;  Location:  [facility];  Service:    • CHEST TUBE INSERTION  2015    LEFT LUNG AFTER LUNG BIOPSY D/T PNEUMOTHORAX    • COLONOSCOPY  2015   • GOLD SEED FIDUCIAL PLACEMENT  05/21/2018    left adrenal gland   • HYSTERECTOMY     • LUNG BIOPSY Bilateral     LEFT IN 2015, RIGHT IN 2017    • LUNG LOBECTOMY Left 08/2015    [facility] PER DR HERRERA    • TEETH EXTRACTION     • THORACOSCOPY VIDEO ASSISTED WITH LOBECTOMY Right 11/30/2017    Procedure: THORACOSCOPY VIDEO ASSISTED WITH RIGHT UPPER LOBE WEDGE RESECTION AND COMPLETION OF RIGHT UPPER LOBECTOMY, MEDIASTINAL LYMPH NODE DISSECTION AND INTERCOSTAL NERVE BLOCKS;  Surgeon: Jeremy Herrera MD;  Location:  [facility];  Service:        Current Outpatient Medications:   •  Ascorbic Acid (VITAMIN C PO), Take 1,000 mg by mouth Daily., Disp: , Rfl:   •  aspirin 81 MG tablet, Take 81 mg by mouth Daily. Last dose 3-29-18, Disp: , Rfl:   •  Cholecalciferol (VITAMIN D3 PO), Take 5,000 Units by mouth Daily., Disp: , Rfl:   •  CRANBERRY PO, Take 1,000 mg by mouth Daily. 2, 500MG CAPSULES DAILY, Disp: , Rfl:   •  docusate sodium (COLACE) 100 MG capsule, Take 100 mg by mouth Daily., Disp: , Rfl:   •  ezetimibe (ZETIA) 10 MG tablet, Take 10 mg by mouth Every Night., Disp: , Rfl:   •  guaiFENesin (MUCINEX) 600 MG 12 hr tablet, Take 1,200 mg by mouth 2 (Two) Times a Day., Disp: , Rfl:   •  loratadine (CLARITIN) 10 MG tablet, Take 10 mg by mouth Daily., Disp: , Rfl:   •  mometasone-formoterol (DULERA 100) 100-5 MCG/ACT inhaler, Inhale 2 puffs Daily As Needed (SHORTNESS OF AIR)., Disp: , Rfl:   •  montelukast (SINGULAIR) 10 MG tablet,  Take 10 mg by mouth Daily As Needed (allergies)., Disp: , Rfl:   •  Multiple Vitamin tablet, Take 1 tablet by mouth Daily., Disp: , Rfl:   •  olmesartan-hydrochlorothiazide (BENICAR HCT) 40-12.5 MG per tablet, Take 1 tablet by mouth Daily., Disp: , Rfl:   •  simethicone (MYLICON) 125 MG chewable tablet, Chew 125 mg Every 6 (Six) Hours As Needed for Flatulence., Disp: , Rfl:   •  simvastatin (ZOCOR) 80 MG tablet, Take 80 mg by mouth Every Night., Disp: , Rfl:   •  Triamcinolone Acetonide (NASACORT AQ NA), 2 sprays into each nostril Daily., Disp: , Rfl:   No current facility-administered medications for this visit.     Facility-Administered Medications Ordered in Other Visits:   •  Chlorhexidine Gluconate Cloth 2 % pads 1 application, 1 application, Topical, Q12H PRN, Bonita Loomis PA  Allergies   Allergen Reactions   • Pneumococcal Vaccines Swelling and Other (See Comments)     REDNESS AND SWELLING OF ARM    • Codeine Nausea And Vomiting   • Hydrocodone Nausea And Vomiting     Social History     Socioeconomic History   • Marital status:      Spouse name: Hardy   • Number of children: 0   • Years of education: Not on file   • Highest education level: Not on file   Social Needs   • Financial resource strain: Not on file   • Food insecurity - worry: Not on file   • Food insecurity - inability: Not on file   • Transportation needs - medical: Not on file   • Transportation needs - non-medical: Not on file   Occupational History   • Occupation: retired     Comment: [employer]    Tobacco Use   • Smoking status: Former Smoker     Packs/day: 0.50     Years: 35.00     Pack years: 17.50     Last attempt to quit: 08/2015     Years since quitting: 3.5   • Smokeless tobacco: Never Used   • Tobacco comment: smoked up to 2ppd at times   Substance and Sexual Activity   • Alcohol use: No   • Drug use: No   • Sexual activity: Defer   Other Topics Concern   • Not on file   Social History Narrative    Lives in [facility] with  "spouse.     Jaimie, niece with pt for appts.     Family History   Problem Relation Age of Onset   • Hypertension Mother    • Heart attack Mother    • Heart attack Father    • Other Sister         renal mass   • Leukemia Maternal Aunt      Review of Systems   Constitution: Negative for chills, fever, malaise/fatigue, night sweats and weight loss.   HENT: Negative for hearing loss, odynophagia and sore throat.    Cardiovascular: Positive for dyspnea on exertion. Negative for chest pain, leg swelling, orthopnea and palpitations.   Respiratory: Negative for cough and hemoptysis.    Endocrine: Negative for cold intolerance, heat intolerance, polydipsia, polyphagia and polyuria.   Hematologic/Lymphatic: Bruises/bleeds easily.   Skin: Negative for itching and rash.   Musculoskeletal: Positive for joint pain (knee pain). Negative for joint swelling and myalgias.   Gastrointestinal: Negative for abdominal pain, constipation, diarrhea, hematemesis, hematochezia, melena, nausea and vomiting.   Genitourinary: Negative for dysuria, frequency and hematuria.   Neurological: Negative for focal weakness, headaches, numbness and seizures.   Psychiatric/Behavioral: Negative for depression and suicidal ideas. The patient is not nervous/anxious.    All other systems reviewed and are negative.    Vitals:    19 1212   BP: 91/68   BP Location: Left arm   Patient Position: Sitting   Pulse: 86   Temp: 97.5 °F (36.4 °C)   TempSrc: Temporal   SpO2: 97%   Weight: 98.7 kg (217 lb 9.6 oz)   Height: 165.1 cm (65\")      Physical Exam:  Gen - NAD, pleasant, cooperative  CV - Regular rate and rhythm, no murmur gallop or rub  Pulm - Lungs clear to auscultation without wheeze or rhonchi   GI - Soft, normoactive bowel sounds, non-tender  Ext - Without edema  Lymph - Without palpable axillary, supraclavicular or anterior chain lymphadenopathy present   Neuro - CN II - XII grossly intact, tongue midline, voice normal    Labs/Imagin19 CT " scan of the chest, abdomen and pelvis  Stable appearance of the chest, abdomen and pelvis without  evidence for metastatic disease or progression.    Assessment/Plan:  Patient is a 79-year-old  female with history of hypertension, hypercholesterolemia, GERD, metastatic left adrenal gland malignant neoplasm and metastatic primary lung cancer who underwent left upper lobectomy performed 8/26/15 revealing stage IB adenocarcinoma and 11/30/17 right upper lobectomy revealing stage IB adenocarcinoma who presents to office for 6 month follow-up for review and discussion of recent CT scan of the chest, abdomen and pelvis.  The patient has been doing well since last being seen in office.  I personally reviewed the patient's recent 1/14/19 CT scan, and discussed the results with the patient, answering all questions to her satisfaction.  The patient's next visit with her oncologist is 7/24/19.  I would like for the patient to follow-up in 6 months after her next round of CT scans.  If she has any questions, concerns or acutely worsening symptoms, she may call our office or present to the nearest emergency department immediately.    Patient Active Problem List   Diagnosis   • HTN (hypertension)   • Hypercholesteremia   • GERD (gastroesophageal reflux disease)   • Metastatic primary lung cancer (CMS/HCC)   • Malignant neoplasm metastatic to left adrenal gland (CMS/HCC)

## 2019-07-22 ENCOUNTER — HOSPITAL ENCOUNTER (OUTPATIENT)
Dept: CT IMAGING | Facility: HOSPITAL | Age: 79
Discharge: HOME | End: 2019-07-22
Admitting: INTERNAL MEDICINE

## 2019-07-22 DIAGNOSIS — C34.12 CANCER OF UPPER LOBE OF LEFT LUNG (HCC): ICD-10-CM

## 2019-07-22 LAB — CREAT BLDA-MCNC: 1.2 MG/DL (ref 0.6–1.3)

## 2019-07-22 PROCEDURE — 25010000002 IOPAMIDOL 61 % SOLUTION: Performed by: INTERNAL MEDICINE

## 2019-07-22 PROCEDURE — 71260 CT THORAX DX C+: CPT

## 2019-07-22 PROCEDURE — 82565 ASSAY OF CREATININE: CPT

## 2019-07-22 PROCEDURE — 74177 CT ABD & PELVIS W/CONTRAST: CPT

## 2019-07-22 RX ADMIN — IOPAMIDOL 70 ML: 612 INJECTION, SOLUTION INTRAVENOUS at 10:15

## 2019-07-24 ENCOUNTER — OFFICE VISIT (OUTPATIENT)
Dept: ONCOLOGY | Facility: CLINIC | Age: 79
End: 2019-07-24

## 2019-07-24 VITALS
BODY MASS INDEX: 34.99 KG/M2 | RESPIRATION RATE: 16 BRPM | WEIGHT: 210 LBS | TEMPERATURE: 97.4 F | HEIGHT: 65 IN | OXYGEN SATURATION: 94 % | SYSTOLIC BLOOD PRESSURE: 142 MMHG | HEART RATE: 71 BPM | DIASTOLIC BLOOD PRESSURE: 67 MMHG

## 2019-07-24 DIAGNOSIS — C79.72 MALIGNANT NEOPLASM METASTATIC TO LEFT ADRENAL GLAND (HCC): ICD-10-CM

## 2019-07-24 DIAGNOSIS — C34.90 PRIMARY MALIGNANT NEOPLASM OF LUNG METASTATIC TO OTHER SITE, UNSPECIFIED LATERALITY (HCC): Primary | ICD-10-CM

## 2019-07-24 DIAGNOSIS — C34.12 CANCER OF UPPER LOBE OF LEFT LUNG (HCC): Primary | ICD-10-CM

## 2019-07-24 DIAGNOSIS — C34.91 MALIGNANT NEOPLASM OF RIGHT LUNG, UNSPECIFIED PART OF LUNG (HCC): ICD-10-CM

## 2019-07-24 PROCEDURE — 99214 OFFICE O/P EST MOD 30 MIN: CPT | Performed by: INTERNAL MEDICINE

## 2019-07-24 NOTE — PROGRESS NOTES
DATE OF VISIT: 7/24/2019    REASON FOR VISIT: Followup for:  A. Stage IB, [id number], poorly differentiated adenocarcinoma of the lung.  B. Stage IB [id number]  right upper lobe poorly differentiated adenocarcinoma  C. Isolated left adrenal metastases: Status post left adrenalectomy February 1, 2018, followed by CyberKnife radiation treatment.    HISTORY OF PRESENT ILLNESS: The patient is a very pleasant 76-year-old female with past medical history significant for poorly differentiated adenocarcinoma of the lung, [id number], tumor size 3.2 cm in the left upper lobe. The patient is status post left upper lobe resection with mediastinal lymph node sampling done by Dr. Jeremy Herrera on 08/26/2015. The patient is here today in scheduled followup visit.    SUBJECTIVE: The patient is here today with her cousin.  All in all she has been doing fairly well.  She denies fever chills no night sweats.  Her breathing is stable.  She is anxious about the scan results.     REVIEW OF SYSTEMS: All the other systems are reviewed by me and negative  except what is mentioned in HPI and subjectives.     PAST MEDICAL HISTORY/SOCIAL HISTORY/FAMILY HISTORY: Unchanged from my prior  documentation done on 08/13/2015.      Current Outpatient Medications:   •  Ascorbic Acid (VITAMIN C PO), Take 1,000 mg by mouth Daily., Disp: , Rfl:   •  aspirin 81 MG tablet, Take 81 mg by mouth Daily. Last dose 3-29-18, Disp: , Rfl:   •  Cholecalciferol (VITAMIN D3 PO), Take 5,000 Units by mouth Daily., Disp: , Rfl:   •  CRANBERRY PO, Take 1,000 mg by mouth Daily. 2, 500MG CAPSULES DAILY, Disp: , Rfl:   •  docusate sodium (COLACE) 100 MG capsule, Take 100 mg by mouth Daily., Disp: , Rfl:   •  ezetimibe (ZETIA) 10 MG tablet, Take 10 mg by mouth Every Night., Disp: , Rfl:   •  guaiFENesin (MUCINEX) 600 MG 12 hr tablet, Take 1,200 mg by mouth 2 (Two) Times a Day., Disp: , Rfl:   •  loratadine (CLARITIN) 10 MG tablet, Take 10 mg by mouth Daily., Disp: , Rfl:   •   "mometasone-formoterol (DULERA 100) 100-5 MCG/ACT inhaler, Inhale 2 puffs Daily As Needed (SHORTNESS OF AIR)., Disp: , Rfl:   •  montelukast (SINGULAIR) 10 MG tablet, Take 10 mg by mouth Daily As Needed (allergies)., Disp: , Rfl:   •  Multiple Vitamin tablet, Take 1 tablet by mouth Daily., Disp: , Rfl:   •  olmesartan-hydrochlorothiazide (BENICAR HCT) 40-12.5 MG per tablet, Take 1 tablet by mouth Daily., Disp: , Rfl:   •  simethicone (MYLICON) 125 MG chewable tablet, Chew 125 mg Every 6 (Six) Hours As Needed for Flatulence., Disp: , Rfl:   •  simvastatin (ZOCOR) 80 MG tablet, Take 80 mg by mouth Every Night., Disp: , Rfl:   •  Triamcinolone Acetonide (NASACORT AQ NA), 2 sprays into each nostril Daily., Disp: , Rfl:   No current facility-administered medications for this visit.     Facility-Administered Medications Ordered in Other Visits:   •  Chlorhexidine Gluconate Cloth 2 % pads 1 application, 1 application, Topical, Q12H PRN, Bonita Loomis PA    PHYSICAL EXAMINATION:   /67   Pulse 71   Temp 97.4 °F (36.3 °C) (Temporal)   Resp 16   Ht 165.1 cm (65\")   Wt 95.3 kg (210 lb)   SpO2 94%   BMI 34.95 kg/m²   ECOG1  GENERAL: Age appropriate. No acute distress.   HEENT: Head atraumatic, normocephalic.   NECK: Supple. No JVD. No lymphadenopathy.   LUNGS: Clear to auscultation bilaterally. No wheezing. No rhonchi.   HEART: Regular rate and rhythm. S1, S2, no murmurs.   ABDOMEN: Soft, nontender, nondistended. Bowel sounds positive. No  hepatosplenomegaly.   EXTREMITIES: No clubbing, cyanosis, +1 bilateral pedal edema.   SKIN: No rashes. No purpura.   NEUROLOGIC: Awake and oriented x3. Strength 5 out of 5 in all muscle groups.     Hospital Outpatient Visit on 07/22/2019   Component Date Value Ref Range Status   • Creatinine 07/22/2019 1.20  0.60 - 1.30 mg/dL Final    Serial Number: [id number]:  [id number]      Ct Chest With Contrast    Result Date: 7/22/2019  Narrative: EXAMINATION: CT CHEST WITH CONTRAST-, " CT ABDOMEN AND PELVIS WITH CONTRAST-07/22/2019:  INDICATION: F/U scan; C34.12-Malignant neoplasm of upper lobe, left bronchus or lung.  TECHNIQUE: CT chest, abdomen and pelvis with intravenous contrast.  The radiation dose reduction device was turned on for each scan per the ALARA (As Low as Reasonably Achievable) protocol.  COMPARISON: CT dated 01/14/2019.  FINDINGS:  CHEST: The thyroid is mildly heterogeneous in attenuation without dominant nodule. No bulky mediastinal adenopathy. Central airways are patent. Esophagus is patulous throughout its normal course. Atherosclerotic nonaneurysmal thoracic aorta with patent great vessel origins. Central pulmonary arteries are grossly patent. Cardiac size within normal limits and without pericardial effusion demonstrating coronary artery calcifications and valvular calcifications. Extended lung windows demonstrate subpleural reticulation in the posterior portion right upper lobe. No focal consolidation or dominant nodule/mass. No pleural effusion. Central bronchiectasis and midlung scarring again noted. Degenerative changes of the spine without aggressive osseous lesion. No soft tissue body wall findings of concern specifically, no bulky axillary adenopathy.  ABDOMEN AND PELVIS:  The liver demonstrates diffuse hepatic steatosis low attenuation values along with stable subcentimeter cyst within the left hemiliver. The gallbladder is unremarkable. No biliary dilatation. The pancreas and spleen are unremarkable. Right adrenal unremarkable with left adrenalectomy. Kidneys unchanged in appearance with severely atrophic left kidney, however, no hydronephrosis or hydroureter. No bulky retroperitoneal adenopathy. Atherosclerotic nonaneurysmal abdominal aorta demonstrating patency throughout. Portal veins and IVC patent. GI tract evaluation without focal thickening or disproportionate dilatation of bowel. No free fluid or intra-abdominal free air. Pelvic viscera are grossly  unremarkable without bulky pelvic adenopathy or free fluid. Degenerative changes of the spine without aggressive osseous lesion.      Impression: Stable appearance of the chest, abdomen and pelvis without evidence for active malignancy or metastatic disease progression.  D:  07/22/2019 E:  07/22/2019    This report was finalized on 7/22/2019 6:04 PM by Dr. Noble Harrell.      Ct Abdomen Pelvis With Contrast    Result Date: 7/22/2019  Narrative: EXAMINATION: CT CHEST WITH CONTRAST-, CT ABDOMEN AND PELVIS WITH CONTRAST-07/22/2019:  INDICATION: F/U scan; C34.12-Malignant neoplasm of upper lobe, left bronchus or lung.  TECHNIQUE: CT chest, abdomen and pelvis with intravenous contrast.  The radiation dose reduction device was turned on for each scan per the ALARA (As Low as Reasonably Achievable) protocol.  COMPARISON: CT dated 01/14/2019.  FINDINGS:  CHEST: The thyroid is mildly heterogeneous in attenuation without dominant nodule. No bulky mediastinal adenopathy. Central airways are patent. Esophagus is patulous throughout its normal course. Atherosclerotic nonaneurysmal thoracic aorta with patent great vessel origins. Central pulmonary arteries are grossly patent. Cardiac size within normal limits and without pericardial effusion demonstrating coronary artery calcifications and valvular calcifications. Extended lung windows demonstrate subpleural reticulation in the posterior portion right upper lobe. No focal consolidation or dominant nodule/mass. No pleural effusion. Central bronchiectasis and midlung scarring again noted. Degenerative changes of the spine without aggressive osseous lesion. No soft tissue body wall findings of concern specifically, no bulky axillary adenopathy.  ABDOMEN AND PELVIS:  The liver demonstrates diffuse hepatic steatosis low attenuation values along with stable subcentimeter cyst within the left hemiliver. The gallbladder is unremarkable. No biliary dilatation. The pancreas and spleen are  unremarkable. Right adrenal unremarkable with left adrenalectomy. Kidneys unchanged in appearance with severely atrophic left kidney, however, no hydronephrosis or hydroureter. No bulky retroperitoneal adenopathy. Atherosclerotic nonaneurysmal abdominal aorta demonstrating patency throughout. Portal veins and IVC patent. GI tract evaluation without focal thickening or disproportionate dilatation of bowel. No free fluid or intra-abdominal free air. Pelvic viscera are grossly unremarkable without bulky pelvic adenopathy or free fluid. Degenerative changes of the spine without aggressive osseous lesion.      Impression: Stable appearance of the chest, abdomen and pelvis without evidence for active malignancy or metastatic disease progression.  D:  07/22/2019 E:  07/22/2019    This report was finalized on 7/22/2019 6:04 PM by Dr. Noble Harrell.    (    ASSESSMENT: The patient is a very pleasant 76-year-old female with stage IB  adenocarcinoma of the lung.    PROBLEM LIST:  1. Stage IB poorly differentiated adenocarcinoma of the lung, [id number]:  A. Diagnosed 08/03/2015 after CT-guided biopsy.  B. Status post left upper lobe resection with mediastinal lymph node sampling  done by Dr. Herrera on 08/26/2015.  C. New right upper lobe lung nodule with left adrenal nodule, both were hypermetabolic active on PET scan done on November 6, 2017  2.  Stage IB right upper lobe adenocarcinoma [id number]:  A. presented with hypermetabolic nodule on a screening PET scan.  B.  Status post surgical resection with a clean margins done by Dr. Herrera November 30 2017  C.  Final pathology revealed 1.7 adenocarcinoma with pleural involvement negative hilar and mediastinal nodes and clear surgical margins  3.  Isolated left adrenal metastases:  A.  Status post left adrenalectomy done on February 1, 2018  B. Pathology report revealed metastatic adenocarcinoma lung primary with positive margin  C. Status post CyberKnife radiation treatment completed  Traci 15, 2018  4.  Hypertension  5.  Hypercholesterolemia  6.  Heartburn    PLAN:  1.  I did go over the CAT scan chest result with the patient, I reassured her there is no evidence of residual or recurrent disease.  I reviewed the films myself.  2.  She will have repeat CBC and CMP prior to return.  3.  I will order repeat CT chest with contrast prior to return.  4.  The patient will follow-up with us in 6 months.  5.  We'll continue hydrochlorothiazide with Benicar for hypertension  6.  Continue simvastatin for hypercholesterolemia  6.  We'll continue omeprazole 20 mg daily for heartburn    Luisana Washburn MD  7/24/2019

## 2019-08-06 ENCOUNTER — TELEPHONE (OUTPATIENT)
Dept: CARDIAC SURGERY | Facility: CLINIC | Age: 79
End: 2019-08-06

## 2019-08-06 NOTE — TELEPHONE ENCOUNTER
PT RECEIVED RECALL LETTER FOR 6 MO F/U WITH [facility] W/ CT CHEST. PT STATED THAT SHE HAD ONE DONE LAST MONTH WITH 'S OFFICE. ORDER IS IN [employer] BUT THERE IS NO RESULT.     Results will be in media**

## 2019-10-15 ENCOUNTER — OFFICE VISIT (OUTPATIENT)
Dept: CARDIAC SURGERY | Facility: CLINIC | Age: 79
End: 2019-10-15

## 2019-10-15 VITALS
HEART RATE: 69 BPM | TEMPERATURE: 98.6 F | OXYGEN SATURATION: 98 % | SYSTOLIC BLOOD PRESSURE: 152 MMHG | DIASTOLIC BLOOD PRESSURE: 72 MMHG | HEIGHT: 65 IN | WEIGHT: 210.2 LBS | BODY MASS INDEX: 35.02 KG/M2

## 2019-10-15 DIAGNOSIS — C34.90 MALIGNANT NEOPLASM OF LUNG, UNSPECIFIED LATERALITY, UNSPECIFIED PART OF LUNG (HCC): Primary | ICD-10-CM

## 2019-10-15 PROCEDURE — 99212 OFFICE O/P EST SF 10 MIN: CPT | Performed by: PHYSICIAN ASSISTANT

## 2019-10-15 NOTE — PROGRESS NOTES
10/15/2019  Patient Information  [facility]                                                                                          [address] [place] [address]   1940  'PCP/Referring Physician'  Hai Solomon MD  [phone]  No ref. provider found    Chief Complaint   Patient presents with   • Follow-up     6 month follow up with CT chest for lung cancer       History of Present Illness:  Patient is a 79-year-old  female with history of hypertension, hypercholesterolemia, GERD, metastatic left adrenal gland malignant neoplasm and metastatic primary lung cancer who underwent left upper lobectomy performed 8/26/15 revealing stage IB adenocarcinoma and 11/30/17 right upper lobectomy revealing stage IB adenocarcinoma who presents to office for 6 month follow-up for review and discussion of recent CT scan of the chest, abdomen and pelvis.  The patient was last seen on 2/12/2019 and denies any interval shortness of breath, difficulty with ambulation, weight loss or hemoptysis.  The patient is followed by her oncologist, Dr. Washburn with whom she has a follow-up appointment again in January 2020.  Otherwise the patient is doing well.    Patient Active Problem List   Diagnosis   • HTN (hypertension)   • Hypercholesteremia   • GERD (gastroesophageal reflux disease)   • Metastatic primary lung cancer (CMS/HCC)   • Malignant neoplasm metastatic to left adrenal gland (CMS/HCC)     Past Medical History:   Diagnosis Date   • Arthritis    • GERD (gastroesophageal reflux disease)    • Hypercholesteremia    • Hypertension    • Lung cancer (CMS/HCC) 2015    LEFT LUNG LOBECTOMY    • Lung cancer (CMS/HCC) 2017    RIGHT    • On home O2     2.5 L HS,    • Pneumothorax 2015    AFTER LUNG BIOPSY    • Seasonal allergies    • Wears dentures    • Wears glasses      Past Surgical History:   Procedure Laterality Date   • ADRENALECTOMY Left 4/6/2018    Procedure: ADRENALECTOMY LAPAROSCOPIC LEFT;  Surgeon: Carol Ann  NEEL Guzmán MD;  Location:  [facility];  Service: General   • BREAST BIOPSY Right    • BREAST LUMPECTOMY Left    • BRONCHOSCOPY N/A 11/30/2017    Procedure: BRONCHOSCOPY;  Surgeon: Jeremy Herrera MD;  Location:  [facility];  Service:    • CHEST TUBE INSERTION  2015    LEFT LUNG AFTER LUNG BIOPSY D/T PNEUMOTHORAX    • COLONOSCOPY  2015   • GOLD SEED FIDUCIAL PLACEMENT  05/21/2018    left adrenal gland   • HYSTERECTOMY     • LUNG BIOPSY Bilateral     LEFT IN 2015, RIGHT IN 2017    • LUNG LOBECTOMY Left 08/2015    [facility] PER DR HERRERA    • TEETH EXTRACTION     • THORACOSCOPY VIDEO ASSISTED WITH LOBECTOMY Right 11/30/2017    Procedure: THORACOSCOPY VIDEO ASSISTED WITH RIGHT UPPER LOBE WEDGE RESECTION AND COMPLETION OF RIGHT UPPER LOBECTOMY, MEDIASTINAL LYMPH NODE DISSECTION AND INTERCOSTAL NERVE BLOCKS;  Surgeon: Jeremy Herrera MD;  Location:  [facility];  Service:        Current Outpatient Medications:   •  Ascorbic Acid (VITAMIN C PO), Take 1,000 mg by mouth Daily., Disp: , Rfl:   •  aspirin 81 MG tablet, Take 81 mg by mouth Daily. Last dose 3-29-18, Disp: , Rfl:   •  Cholecalciferol (VITAMIN D3 PO), Take 5,000 Units by mouth Daily., Disp: , Rfl:   •  CRANBERRY PO, Take 1,000 mg by mouth Daily. 2, 500MG CAPSULES DAILY, Disp: , Rfl:   •  docusate sodium (COLACE) 100 MG capsule, Take 100 mg by mouth Daily., Disp: , Rfl:   •  ezetimibe (ZETIA) 10 MG tablet, Take 10 mg by mouth Every Night., Disp: , Rfl:   •  guaiFENesin (MUCINEX) 600 MG 12 hr tablet, Take 1,200 mg by mouth 2 (Two) Times a Day., Disp: , Rfl:   •  loratadine (CLARITIN) 10 MG tablet, Take 10 mg by mouth Daily., Disp: , Rfl:   •  mometasone-formoterol (DULERA 100) 100-5 MCG/ACT inhaler, Inhale 2 puffs Daily As Needed (SHORTNESS OF AIR)., Disp: , Rfl:   •  montelukast (SINGULAIR) 10 MG tablet, Take 10 mg by mouth Daily As Needed (allergies)., Disp: , Rfl:   •  Multiple Vitamin tablet, Take 1 tablet by mouth Daily., Disp: , Rfl:   •  olmesartan-hydrochlorothiazide  (BENICAR HCT) 40-12.5 MG per tablet, Take 1 tablet by mouth Daily., Disp: , Rfl:   •  simethicone (MYLICON) 125 MG chewable tablet, Chew 125 mg Every 6 (Six) Hours As Needed for Flatulence., Disp: , Rfl:   •  simvastatin (ZOCOR) 80 MG tablet, Take 80 mg by mouth Every Night., Disp: , Rfl:   •  Triamcinolone Acetonide (NASACORT AQ NA), 2 sprays into each nostril Daily., Disp: , Rfl:   No current facility-administered medications for this visit.     Facility-Administered Medications Ordered in Other Visits:   •  Chlorhexidine Gluconate Cloth 2 % pads 1 application, 1 application, Topical, Q12H PRN, Bonita Loomis PA  Allergies   Allergen Reactions   • Pneumococcal Vaccines Swelling and Other (See Comments)     REDNESS AND SWELLING OF ARM    • Codeine Nausea And Vomiting   • Hydrocodone Nausea And Vomiting     Social History     Socioeconomic History   • Marital status:      Spouse name: Hardy   • Number of children: 0   • Years of education: Not on file   • Highest education level: Not on file   Occupational History   • Occupation: retired     Comment: [employer]    Tobacco Use   • Smoking status: Former Smoker     Packs/day: 0.50     Years: 35.00     Pack years: 17.50     Last attempt to quit: 2015     Years since quittin.2   • Smokeless tobacco: Never Used   • Tobacco comment: smoked up to 2ppd at times   Substance and Sexual Activity   • Alcohol use: No   • Drug use: No   • Sexual activity: Defer   Social History Narrative    Lives in [place] with spouse.     Jaimie, niece with pt for appts.     Family History   Problem Relation Age of Onset   • Hypertension Mother    • Heart attack Mother    • Heart attack Father    • Other Sister         renal mass   • Leukemia Maternal Aunt      Review of Systems   Constitution: Negative for chills, diaphoresis, fever, malaise/fatigue and weight loss.   HENT: Negative for congestion, hearing loss, hoarse voice and sore throat.    Eyes: Negative for blurred  "vision and double vision.   Cardiovascular: Positive for leg swelling (left foot). Negative for chest pain, claudication, dyspnea on exertion, palpitations and syncope.   Respiratory: Negative for cough, hemoptysis, shortness of breath and wheezing.    Hematologic/Lymphatic: Bruises/bleeds easily.   Skin: Negative for itching, poor wound healing and rash.   Musculoskeletal: Positive for back pain (lower back), joint pain (bilateral knees , left worse than right) and joint swelling. Negative for arthritis, falls, muscle cramps, muscle weakness and neck pain.   Gastrointestinal: Negative for abdominal pain, constipation, diarrhea, hematemesis, nausea and vomiting.   Genitourinary: Negative for dysuria, frequency, hematuria, hesitancy, incomplete emptying and urgency.   Neurological: Positive for numbness (feet). Negative for dizziness, headaches, light-headedness, loss of balance and vertigo.   Psychiatric/Behavioral: Negative for depression, memory loss and substance abuse. The patient is not nervous/anxious.    Allergic/Immunologic: Positive for environmental allergies. Negative for HIV exposure.     Vitals:    10/15/19 0939   BP: 152/72   Pulse: 69   Temp: 98.6 °F (37 °C)   TempSrc: Temporal   SpO2: 98%   Weight: 95.3 kg (210 lb 3.2 oz)   Height: 165.1 cm (65\")      Physical Exam  Gen - NAD, pleasant, cooperative  CV -regular rate and rhythm, no murmur gallop or rub  Pulm -lungs clear to auscultation bilateral without wheeze or rhonchi  GI -soft, normoactive bowel sounds, nontender  Ext - without edema.   Incision -healing well, no evidence of incisional dehiscence or cellulitis  Neuro - CN II - XII grossly intact, tongue midline, voice normal    Imaging:  CT chest, abdomen/pelvis 7/22/2019  IMPRESSION:  Stable appearance of the chest, abdomen and pelvis without  evidence for active malignancy or metastatic disease progression.    Assessment/Plan:  Patient is a 79-year-old  female with history of " hypertension, hypercholesterolemia, GERD, metastatic left adrenal gland malignant neoplasm and metastatic primary lung cancer who underwent left upper lobectomy performed 8/26/15 revealing stage IB adenocarcinoma and 11/30/17 right upper lobectomy revealing stage IB adenocarcinoma.  CT scan results discussed in detail with patient in office today and all questions were answered to her satisfaction. She is scheduled to see Dr. Washburn again in January 2020. We will plan to see the patient back again in 6 months with repeat ct scan of the chest for continued surveillance.  The patient may call our office with any questions or concerns should any arise during the interval.  Patient Active Problem List   Diagnosis   • HTN (hypertension)   • Hypercholesteremia   • GERD (gastroesophageal reflux disease)   • Metastatic primary lung cancer (CMS/HCC)   • Malignant neoplasm metastatic to left adrenal gland (CMS/HCC)

## 2020-01-20 ENCOUNTER — HOSPITAL ENCOUNTER (OUTPATIENT)
Dept: CT IMAGING | Facility: HOSPITAL | Age: 80
Discharge: HOME | End: 2020-01-20
Admitting: INTERNAL MEDICINE

## 2020-01-20 DIAGNOSIS — C34.12 CANCER OF UPPER LOBE OF LEFT LUNG (HCC): ICD-10-CM

## 2020-01-20 DIAGNOSIS — C34.91 MALIGNANT NEOPLASM OF RIGHT LUNG, UNSPECIFIED PART OF LUNG (HCC): ICD-10-CM

## 2020-01-20 DIAGNOSIS — C79.72 MALIGNANT NEOPLASM METASTATIC TO LEFT ADRENAL GLAND (HCC): ICD-10-CM

## 2020-01-20 LAB — CREAT BLDA-MCNC: 1.1 MG/DL (ref 0.6–1.3)

## 2020-01-20 PROCEDURE — 71260 CT THORAX DX C+: CPT

## 2020-01-20 PROCEDURE — 25010000002 IOPAMIDOL 61 % SOLUTION: Performed by: INTERNAL MEDICINE

## 2020-01-20 PROCEDURE — 74177 CT ABD & PELVIS W/CONTRAST: CPT

## 2020-01-20 PROCEDURE — 82565 ASSAY OF CREATININE: CPT

## 2020-01-20 RX ADMIN — IOPAMIDOL 80 ML: 612 INJECTION, SOLUTION INTRAVENOUS at 10:31

## 2020-01-21 ENCOUNTER — TELEPHONE (OUTPATIENT)
Dept: ONCOLOGY | Facility: CLINIC | Age: 80
End: 2020-01-21

## 2020-01-21 LAB
ALBUMIN SERPL-MCNC: NORMAL G/DL
ALP SERPL-CCNC: NORMAL U/L
ALT SERPL-CCNC: NORMAL U/L
AST SERPL-CCNC: NORMAL U/L
BASOPHILS # BLD AUTO: 0.04 10*3/MM3 (ref 0–0.2)
BASOPHILS NFR BLD AUTO: 0.6 % (ref 0–1.5)
BILIRUB SERPL-MCNC: NORMAL MG/DL
BUN SERPL-MCNC: NORMAL MG/DL
CALCIUM SERPL-MCNC: NORMAL MG/DL
CHLORIDE SERPL-SCNC: NORMAL MMOL/L
CO2 SERPL-SCNC: NORMAL MMOL/L
CREAT SERPL-MCNC: NORMAL MG/DL
EOSINOPHIL # BLD AUTO: 0.33 10*3/MM3 (ref 0–0.4)
EOSINOPHIL NFR BLD AUTO: 4.8 % (ref 0.3–6.2)
ERYTHROCYTE [DISTWIDTH] IN BLOOD BY AUTOMATED COUNT: 13.6 % (ref 12.3–15.4)
GLUCOSE SERPL-MCNC: NORMAL MG/DL
HCT VFR BLD AUTO: 34 % (ref 34–46.6)
HGB BLD-MCNC: 11.1 G/DL (ref 12–15.9)
IMM GRANULOCYTES # BLD AUTO: 0.02 10*3/MM3 (ref 0–0.05)
IMM GRANULOCYTES NFR BLD AUTO: 0.3 % (ref 0–0.5)
LYMPHOCYTES # BLD AUTO: 1.39 10*3/MM3 (ref 0.7–3.1)
LYMPHOCYTES NFR BLD AUTO: 20.3 % (ref 19.6–45.3)
MCH RBC QN AUTO: 29.1 PG (ref 26.6–33)
MCHC RBC AUTO-ENTMCNC: 32.6 G/DL (ref 31.5–35.7)
MCV RBC AUTO: 89.2 FL (ref 79–97)
MONOCYTES # BLD AUTO: 0.68 10*3/MM3 (ref 0.1–0.9)
MONOCYTES NFR BLD AUTO: 9.9 % (ref 5–12)
NEUTROPHILS # BLD AUTO: 4.39 10*3/MM3 (ref 1.7–7)
NEUTROPHILS NFR BLD AUTO: 64.1 % (ref 42.7–76)
NRBC BLD AUTO-RTO: 0 /100 WBC (ref 0–0.2)
PLATELET # BLD AUTO: 234 10*3/MM3 (ref 140–450)
POTASSIUM SERPL-SCNC: NORMAL MMOL/L
PROT SERPL-MCNC: NORMAL G/DL
RBC # BLD AUTO: 3.81 10*6/MM3 (ref 3.77–5.28)
SODIUM SERPL-SCNC: NORMAL MMOL/L
WBC # BLD AUTO: 6.85 10*3/MM3 (ref 3.4–10.8)

## 2020-01-21 NOTE — TELEPHONE ENCOUNTER
Alexandra from [id number] called [phone] ext. [phone], reporting she was unable to complete the CMP result for this patient.

## 2020-01-22 ENCOUNTER — OFFICE VISIT (OUTPATIENT)
Dept: ONCOLOGY | Facility: CLINIC | Age: 80
End: 2020-01-22

## 2020-01-22 VITALS
DIASTOLIC BLOOD PRESSURE: 62 MMHG | SYSTOLIC BLOOD PRESSURE: 140 MMHG | WEIGHT: 204 LBS | HEART RATE: 76 BPM | BODY MASS INDEX: 33.99 KG/M2 | OXYGEN SATURATION: 97 % | TEMPERATURE: 96.7 F | RESPIRATION RATE: 16 BRPM | HEIGHT: 65 IN

## 2020-01-22 DIAGNOSIS — C79.72 MALIGNANT NEOPLASM METASTATIC TO LEFT ADRENAL GLAND (HCC): Primary | ICD-10-CM

## 2020-01-22 PROCEDURE — 99214 OFFICE O/P EST MOD 30 MIN: CPT | Performed by: NURSE PRACTITIONER

## 2020-01-22 RX ORDER — ONDANSETRON 4 MG/1
4 TABLET, FILM COATED ORAL AS NEEDED
COMMUNITY
Start: 2019-11-22

## 2020-01-22 NOTE — PROGRESS NOTES
DATE OF VISIT: 1/22/2020    REASON FOR VISIT: Followup for:  A. Stage IB, [id number], poorly differentiated adenocarcinoma of the lung.  B. Stage IB [id number]  right upper lobe poorly differentiated adenocarcinoma  C. Isolated left adrenal metastases: Status post left adrenalectomy February 1, 2018, followed by CyberKnife radiation treatment.    HISTORY OF PRESENT ILLNESS: The patient is a very pleasant 76-year-old female with past medical history significant for poorly differentiated adenocarcinoma of the lung, [id number], tumor size 3.2 cm in the left upper lobe. The patient is status post left upper lobe resection with mediastinal lymph node sampling done by Dr. Jeremy Herrera on 08/26/2015. The patient is here today in scheduled followup visit.    SUBJECTIVE: The patient is here today with her .  Overall,  she has been doing fairly well.  She denies fever chills no night sweats.  Her breathing is stable.  The only complaint she has is some knee pain she is waiting for insurance to approve her for a procedure to help with pain. She is anxious about the scan results.     REVIEW OF SYSTEMS: All the other systems are reviewed by me and negative  except what is mentioned in HPI and subjectives.     PAST MEDICAL HISTORY/SOCIAL HISTORY/FAMILY HISTORY: Unchanged from my prior  documentation done on 08/13/2015.      Current Outpatient Medications:   •  Ascorbic Acid (VITAMIN C PO), Take 1,000 mg by mouth Daily., Disp: , Rfl:   •  aspirin 81 MG tablet, Take 81 mg by mouth Daily. Last dose 3-29-18, Disp: , Rfl:   •  Cholecalciferol (VITAMIN D3 PO), Take 5,000 Units by mouth Daily., Disp: , Rfl:   •  CRANBERRY PO, Take 1,000 mg by mouth Daily. 2, 500MG CAPSULES DAILY, Disp: , Rfl:   •  docusate sodium (COLACE) 100 MG capsule, Take 100 mg by mouth Daily., Disp: , Rfl:   •  ezetimibe (ZETIA) 10 MG tablet, Take 10 mg by mouth Every Night., Disp: , Rfl:   •  guaiFENesin (MUCINEX) 600 MG 12 hr tablet, Take 1,200 mg by mouth 2 (Two)  "Times a Day., Disp: , Rfl:   •  loratadine (CLARITIN) 10 MG tablet, Take 10 mg by mouth Daily., Disp: , Rfl:   •  mometasone-formoterol (DULERA 100) 100-5 MCG/ACT inhaler, Inhale 2 puffs Daily As Needed (SHORTNESS OF AIR)., Disp: , Rfl:   •  montelukast (SINGULAIR) 10 MG tablet, Take 10 mg by mouth Daily As Needed (allergies)., Disp: , Rfl:   •  Multiple Vitamin tablet, Take 1 tablet by mouth Daily., Disp: , Rfl:   •  olmesartan-hydrochlorothiazide (BENICAR HCT) 40-12.5 MG per tablet, Take 1 tablet by mouth Daily., Disp: , Rfl:   •  ondansetron (ZOFRAN) 4 MG tablet, TAKE 1 TABLET BY MOUTH THREE TIMES DAILY AS NEEDED FOR NAUSEA AND VOMITING, Disp: , Rfl:   •  simethicone (MYLICON) 125 MG chewable tablet, Chew 125 mg Every 6 (Six) Hours As Needed for Flatulence., Disp: , Rfl:   •  simvastatin (ZOCOR) 80 MG tablet, Take 80 mg by mouth Every Night., Disp: , Rfl:   •  Triamcinolone Acetonide (NASACORT AQ NA), 2 sprays into each nostril Daily., Disp: , Rfl:   No current facility-administered medications for this visit.     Facility-Administered Medications Ordered in Other Visits:   •  Chlorhexidine Gluconate Cloth 2 % pads 1 application, 1 application, Topical, Q12H PRN, Bonita Loomis PA    PHYSICAL EXAMINATION:   /62   Pulse 76   Temp 96.7 °F (35.9 °C) (Temporal)   Resp 16   Ht 165.1 cm (65\")   Wt 92.5 kg (204 lb)   SpO2 97%   BMI 33.95 kg/m²   ECOG1  GENERAL: Age appropriate. No acute distress.   HEENT: Head atraumatic, normocephalic.   NECK: Supple. No JVD. No lymphadenopathy.   LUNGS: Clear to auscultation bilaterally. No wheezing. No rhonchi.   HEART: Regular rate and rhythm. S1, S2, no murmurs.   ABDOMEN: Soft, nontender, nondistended. Bowel sounds positive. No  hepatosplenomegaly.   EXTREMITIES: No clubbing, cyanosis, +1 bilateral pedal edema.   SKIN: No rashes. No purpura.   NEUROLOGIC: Awake and oriented x3. Strength 5 out of 5 in all muscle groups.     Orders Only on 01/20/2020   Component " Date Value Ref Range Status   • WBC 01/20/2020 6.85  3.40 - 10.80 10*3/mm3 Final   • RBC 01/20/2020 3.81  3.77 - 5.28 10*6/mm3 Final   • Hemoglobin 01/20/2020 11.1* 12.0 - 15.9 g/dL Final   • Hematocrit 01/20/2020 34.0  34.0 - 46.6 % Final   • MCV 01/20/2020 89.2  79.0 - 97.0 fL Final   • MCH 01/20/2020 29.1  26.6 - 33.0 pg Final   • MCHC 01/20/2020 32.6  31.5 - 35.7 g/dL Final   • RDW 01/20/2020 13.6  12.3 - 15.4 % Final   • Platelets 01/20/2020 234  140 - 450 10*3/mm3 Final   • Neutrophil Rel % 01/20/2020 64.1  42.7 - 76.0 % Final   • Lymphocyte Rel % 01/20/2020 20.3  19.6 - 45.3 % Final   • Monocyte Rel % 01/20/2020 9.9  5.0 - 12.0 % Final   • Eosinophil Rel % 01/20/2020 4.8  0.3 - 6.2 % Final   • Basophil Rel % 01/20/2020 0.6  0.0 - 1.5 % Final   • Neutrophils Absolute 01/20/2020 4.39  1.70 - 7.00 10*3/mm3 Final   • Lymphocytes Absolute 01/20/2020 1.39  0.70 - 3.10 10*3/mm3 Final   • Monocytes Absolute 01/20/2020 0.68  0.10 - 0.90 10*3/mm3 Final   • Eosinophils Absolute 01/20/2020 0.33  0.00 - 0.40 10*3/mm3 Final   • Basophils Absolute 01/20/2020 0.04  0.00 - 0.20 10*3/mm3 Final   • Immature Granulocyte Rel % 01/20/2020 0.3  0.0 - 0.5 % Final   • Immature Grans Absolute 01/20/2020 0.02  0.00 - 0.05 10*3/mm3 Final   • nRBC 01/20/2020 0.0  0.0 - 0.2 /100 WBC Final   • Glucose 01/20/2020 CANCELED  mg/dL Final-Edited    Comment: Specimen was too hemolyzed for analysis.  Contact:  Laurel WISEMAN 1/21/20    Result canceled by the ancillary.     • BUN 01/20/2020 CANCELED   Final-Edited    Comment: Test not performed    Result canceled by the ancillary.     • Creatinine 01/20/2020 CANCELED   Final-Edited    Comment: Test not performed    Result canceled by the ancillary.     • Sodium 01/20/2020 CANCELED   Final-Edited    Comment: Test not performed    Result canceled by the ancillary.     • Potassium 01/20/2020 CANCELED   Final-Edited    Comment: Test not performed    Result canceled by the ancillary.     • Chloride  01/20/2020 CANCELED   Final-Edited    Comment: Test not performed    Result canceled by the ancillary.     • Total CO2 01/20/2020 CANCELED   Final-Edited    Comment: Test not performed    Result canceled by the ancillary.     • Calcium 01/20/2020 CANCELED   Final-Edited    Comment: Test not performed    Result canceled by the ancillary.     • Total Protein 01/20/2020 CANCELED   Final-Edited    Comment: Test not performed    Result canceled by the ancillary.     • Albumin 01/20/2020 CANCELED   Final-Edited    Comment: Test not performed    Result canceled by the ancillary.     • Total Bilirubin 01/20/2020 CANCELED   Final-Edited    Comment: Test not performed    Result canceled by the ancillary.     • Alkaline Phosphatase 01/20/2020 CANCELED   Final-Edited    Comment: Test not performed    Result canceled by the ancillary.     • AST (SGOT) 01/20/2020 CANCELED   Final-Edited    Comment: Test not performed    Result canceled by the ancillary.     • ALT (SGPT) 01/20/2020 CANCELED   Final-Edited    Comment: Test not performed    Result canceled by the ancillary.     Hospital Outpatient Visit on 01/20/2020   Component Date Value Ref Range Status   • Creatinine 01/20/2020 1.10  0.60 - 1.30 mg/dL Final    Serial Number: [id number]:  [id number]      Ct Chest With Contrast    Result Date: 1/20/2020  Narrative: EXAMINATION: CT CHEST W CONTRAST- 01/20/2020  INDICATION: restaging lung cancer; C34.12-Malignant neoplasm of upper lobe, left bronchus or lung; C79.72-Secondary malignant neoplasm of left adrenal gland; C34.91-Malignant neoplasm of unspecified part of right bronchus or lung  TECHNIQUE: CT scan of the chest was performed following intravenous contrast.  The radiation dose reduction device was turned on for each scan per the ALARA (As Low as Reasonably Achievable) protocol.  COMPARISON: 07/22/2019  FINDINGS: There is no axillary lymphadenopathy. There is no mediastinal or hilar adenopathy. There is no pericardial or  pleural effusion. There are diffuse chronic pulmonary changes but with no recurrent pulmonary nodule or mass. There is no pleural effusion. There has been no change the previous examination of 07/22/2019.      Impression: Negative CT scan of the chest. There has been no change since 07/22/2019.  D:  01/20/2020 E:  01/20/2020   This report was finalized on 1/20/2020 4:30 PM by Dr. Que Hirsch MD.      Ct Abdomen Pelvis With Contrast    Result Date: 1/20/2020  Narrative: EXAMINATION: CT ABDOMEN AND PELVIS WITH CONTRAST-01/20/2020:  INDICATION: Restaging lung cancer; C34.12-Malignant neoplasm of upper lobe, left bronchus or lung; C79.72-Secondary malignant neoplasm of left adrenal gland; C34.91-Malignant neoplasm of unspecified part of right bronchus or lung.  TECHNIQUE: CT scan of the abdomen and pelvis was performed following intravenous contrast.  The radiation dose reduction device was turned on for each scan per the ALARA (As Low as Reasonably Achievable) protocol.  COMPARISON: 07/22/2019.  FINDINGS: There is a small incidental cyst in the left lobe of the liver. The liver is otherwise normal. There is no adrenal or pancreatic mass. There are fiducials in the region of the left adrenal gland with no associated adrenal mass. The right adrenal gland is normal. The left kidney is markedly atrophic.  There is no right renal abnormality other than a nonobstructing 4 mm stone. There is no ascites, aneurysm or retroperitoneal lymphadenopathy. There are scattered diverticuli without features of diverticulitis. There is no pelvic or inguinal lymphadenopathy.      Impression: There are no acute findings. There is no evidence of metastatic disease in the abdomen and pelvis and there has been no change when compared with 10/24/2016.  D:  01/20/2020 E:  01/20/2020    This report was finalized on 1/20/2020 4:30 PM by Dr. Que Hirsch MD.    (    ASSESSMENT: The patient is a very pleasant 76-year-old female with stage  IB  adenocarcinoma of the lung.    PROBLEM LIST:  1. Stage IB poorly differentiated adenocarcinoma of the lung, [id number]:  A. Diagnosed 08/03/2015 after CT-guided biopsy.  B. Status post left upper lobe resection with mediastinal lymph node sampling  done by Dr. Herrera on 08/26/2015.  C. New right upper lobe lung nodule with left adrenal nodule, both were hypermetabolic active on PET scan done on November 6, 2017  2.  Stage IB right upper lobe adenocarcinoma [id number]:  A. presented with hypermetabolic nodule on a screening PET scan.  B.  Status post surgical resection with a clean margins done by Dr. Herrera November 30 2017  C.  Final pathology revealed 1.7 adenocarcinoma with pleural involvement negative hilar and mediastinal nodes and clear surgical margins  3.  Isolated left adrenal metastases:  A.  Status post left adrenalectomy done on February 1, 2018  B. Pathology report revealed metastatic adenocarcinoma lung primary with positive margin  C. Status post CyberKnife radiation treatment completed Traci 15, 2018  4.  Hypertension  5.  Hypercholesterolemia  6.  Heartburn  7. Knee pain    PLAN:  1.  I discussed with the patient and her  that  CAT scan chest, abdomen and pelvis showed there is no evidence of residual or recurrent disease.  I reviewed the films myself.  2.  She will have repeat CBC and CMP prior to return.  3.  I will order repeat CT chest with contrast prior to return.  4.  The patient will follow-up with us in 6 months.  5.  We'll continue hydrochlorothiazide with Benicar for hypertension  6.  Continue simvastatin for hypercholesterolemia  7.  We'll continue omeprazole 20 mg daily for heartburn.   8. She will continue to follow up with orthopedic for management of knee pain.     Bonita Mejia, APRN    1/22/2020

## 2020-04-17 ENCOUNTER — TELEPHONE (OUTPATIENT)
Dept: CARDIAC SURGERY | Facility: CLINIC | Age: 80
End: 2020-04-17

## 2020-06-03 ENCOUNTER — TELEPHONE (OUTPATIENT)
Dept: CARDIAC SURGERY | Facility: CLINIC | Age: 80
End: 2020-06-03

## 2020-06-03 NOTE — TELEPHONE ENCOUNTER
Called pt regarding 6m f/u with Dr. Herrera. Pt would like to return to see. Dr. Herrera. She is actually already scheduled for a CT Chest on 07/20. She is aware of her apt date and time with our office.

## 2020-07-20 ENCOUNTER — HOSPITAL ENCOUNTER (OUTPATIENT)
Dept: CT IMAGING | Facility: HOSPITAL | Age: 80
Discharge: HOME | End: 2020-07-20
Admitting: NURSE PRACTITIONER

## 2020-07-20 DIAGNOSIS — C79.72 MALIGNANT NEOPLASM METASTATIC TO LEFT ADRENAL GLAND (HCC): ICD-10-CM

## 2020-07-20 LAB
ALBUMIN SERPL-MCNC: 4.2 G/DL (ref 3.5–5.2)
ALBUMIN/GLOB SERPL: 2.1 G/DL
ALP SERPL-CCNC: 65 U/L (ref 39–117)
ALT SERPL-CCNC: 17 U/L (ref 1–33)
AST SERPL-CCNC: 15 U/L (ref 1–32)
BASOPHILS # BLD AUTO: 0.02 10*3/MM3 (ref 0–0.2)
BASOPHILS NFR BLD AUTO: 0.3 % (ref 0–1.5)
BILIRUB SERPL-MCNC: 0.2 MG/DL (ref 0–1.2)
BUN SERPL-MCNC: 29 MG/DL (ref 8–23)
BUN/CREAT SERPL: 25 (ref 7–25)
CALCIUM SERPL-MCNC: 9.6 MG/DL (ref 8.6–10.5)
CHLORIDE SERPL-SCNC: 104 MMOL/L (ref 98–107)
CO2 SERPL-SCNC: 28.2 MMOL/L (ref 22–29)
CREAT BLDA-MCNC: 1.3 MG/DL (ref 0.6–1.3)
CREAT SERPL-MCNC: 1.16 MG/DL (ref 0.57–1)
EOSINOPHIL # BLD AUTO: 0.32 10*3/MM3 (ref 0–0.4)
EOSINOPHIL NFR BLD AUTO: 4.1 % (ref 0.3–6.2)
ERYTHROCYTE [DISTWIDTH] IN BLOOD BY AUTOMATED COUNT: 12.8 % (ref 12.3–15.4)
GLOBULIN SER CALC-MCNC: 2 GM/DL
GLUCOSE SERPL-MCNC: 103 MG/DL (ref 65–99)
HCT VFR BLD AUTO: 34.1 % (ref 34–46.6)
HGB BLD-MCNC: 11.4 G/DL (ref 12–15.9)
IMM GRANULOCYTES # BLD AUTO: 0.02 10*3/MM3 (ref 0–0.05)
IMM GRANULOCYTES NFR BLD AUTO: 0.3 % (ref 0–0.5)
LYMPHOCYTES # BLD AUTO: 1.54 10*3/MM3 (ref 0.7–3.1)
LYMPHOCYTES NFR BLD AUTO: 19.8 % (ref 19.6–45.3)
MCH RBC QN AUTO: 30.3 PG (ref 26.6–33)
MCHC RBC AUTO-ENTMCNC: 33.4 G/DL (ref 31.5–35.7)
MCV RBC AUTO: 90.7 FL (ref 79–97)
MONOCYTES # BLD AUTO: 0.82 10*3/MM3 (ref 0.1–0.9)
MONOCYTES NFR BLD AUTO: 10.6 % (ref 5–12)
NEUTROPHILS # BLD AUTO: 5.05 10*3/MM3 (ref 1.7–7)
NEUTROPHILS NFR BLD AUTO: 64.9 % (ref 42.7–76)
NRBC BLD AUTO-RTO: 0 /100 WBC (ref 0–0.2)
PLATELET # BLD AUTO: 233 10*3/MM3 (ref 140–450)
POTASSIUM SERPL-SCNC: 4.2 MMOL/L (ref 3.5–5.2)
PROT SERPL-MCNC: 6.2 G/DL (ref 6–8.5)
RBC # BLD AUTO: 3.76 10*6/MM3 (ref 3.77–5.28)
SODIUM SERPL-SCNC: 142 MMOL/L (ref 136–145)
WBC # BLD AUTO: 7.77 10*3/MM3 (ref 3.4–10.8)

## 2020-07-20 PROCEDURE — 74177 CT ABD & PELVIS W/CONTRAST: CPT

## 2020-07-20 PROCEDURE — 71260 CT THORAX DX C+: CPT

## 2020-07-20 PROCEDURE — 25010000002 IOPAMIDOL 61 % SOLUTION: Performed by: NURSE PRACTITIONER

## 2020-07-20 PROCEDURE — 82565 ASSAY OF CREATININE: CPT

## 2020-07-20 RX ADMIN — IOPAMIDOL 95 ML: 612 INJECTION, SOLUTION INTRAVENOUS at 09:55

## 2020-07-22 ENCOUNTER — OFFICE VISIT (OUTPATIENT)
Dept: ONCOLOGY | Facility: CLINIC | Age: 80
End: 2020-07-22

## 2020-07-22 VITALS
HEIGHT: 65 IN | DIASTOLIC BLOOD PRESSURE: 56 MMHG | HEART RATE: 78 BPM | TEMPERATURE: 96.9 F | WEIGHT: 206 LBS | SYSTOLIC BLOOD PRESSURE: 110 MMHG | RESPIRATION RATE: 16 BRPM | OXYGEN SATURATION: 98 % | BODY MASS INDEX: 34.32 KG/M2

## 2020-07-22 DIAGNOSIS — C79.72 MALIGNANT NEOPLASM METASTATIC TO LEFT ADRENAL GLAND (HCC): ICD-10-CM

## 2020-07-22 DIAGNOSIS — C34.12 CANCER OF UPPER LOBE OF LEFT LUNG (HCC): Primary | ICD-10-CM

## 2020-07-22 PROCEDURE — 99214 OFFICE O/P EST MOD 30 MIN: CPT | Performed by: NURSE PRACTITIONER

## 2020-07-22 NOTE — PROGRESS NOTES
DATE OF VISIT: 7/22/2020    REASON FOR VISIT: Followup for:  A. Stage IB, [id number], poorly differentiated adenocarcinoma of the lung.  B. Stage IB [id number]  right upper lobe poorly differentiated adenocarcinoma  C. Isolated left adrenal metastases: Status post left adrenalectomy February 1, 2018, followed by CyberKnife radiation treatment.    HISTORY OF PRESENT ILLNESS: The patient is a very pleasant 76-year-old female with past medical history significant for poorly differentiated adenocarcinoma of the lung, [id number], tumor size 3.2 cm in the left upper lobe. The patient is status post left upper lobe resection with mediastinal lymph node sampling done by Dr. Jeremy Herrera on 08/26/2015. The patient is here today in scheduled followup visit.    SUBJECTIVE: The patient is here today by herself.  Overall,  she has been doing well.  She says she feels great. She denies fever chills no night sweats.  Denies headaches. Her breathing is stable. Knee pain has improved since injections.  She is anxious about the scan results.     REVIEW OF SYSTEMS: All the other systems are reviewed by me and negative except what is mentioned in HPI and subjectives.     PAST MEDICAL HISTORY/SOCIAL HISTORY/FAMILY HISTORY: Unchanged from my prior  documentation done on 08/13/2015.      Current Outpatient Medications:   •  Ascorbic Acid (VITAMIN C PO), Take 1,000 mg by mouth Daily., Disp: , Rfl:   •  aspirin 81 MG tablet, Take 81 mg by mouth Daily. Last dose 3-29-18, Disp: , Rfl:   •  Cholecalciferol (VITAMIN D3 PO), Take 5,000 Units by mouth Daily., Disp: , Rfl:   •  CRANBERRY PO, Take 1,000 mg by mouth Daily. 2, 500MG CAPSULES DAILY, Disp: , Rfl:   •  docusate sodium (COLACE) 100 MG capsule, Take 100 mg by mouth Daily., Disp: , Rfl:   •  ezetimibe (ZETIA) 10 MG tablet, Take 10 mg by mouth Every Night., Disp: , Rfl:   •  guaiFENesin (MUCINEX) 600 MG 12 hr tablet, Take 1,200 mg by mouth 2 (Two) Times a Day., Disp: , Rfl:   •  loratadine (CLARITIN)  10 MG tablet, Take 10 mg by mouth Daily., Disp: , Rfl:   •  mometasone-formoterol (DULERA 100) 100-5 MCG/ACT inhaler, Inhale 2 puffs Daily As Needed (SHORTNESS OF AIR)., Disp: , Rfl:   •  montelukast (SINGULAIR) 10 MG tablet, Take 10 mg by mouth Daily As Needed (allergies)., Disp: , Rfl:   •  Multiple Vitamin tablet, Take 1 tablet by mouth Daily., Disp: , Rfl:   •  olmesartan-hydrochlorothiazide (BENICAR HCT) 40-12.5 MG per tablet, Take 1 tablet by mouth Daily., Disp: , Rfl:   •  ondansetron (ZOFRAN) 4 MG tablet, TAKE 1 TABLET BY MOUTH THREE TIMES DAILY AS NEEDED FOR NAUSEA AND VOMITING, Disp: , Rfl:   •  simethicone (MYLICON) 125 MG chewable tablet, Chew 125 mg Every 6 (Six) Hours As Needed for Flatulence., Disp: , Rfl:   •  simvastatin (ZOCOR) 80 MG tablet, Take 80 mg by mouth Every Night., Disp: , Rfl:   •  Triamcinolone Acetonide (NASACORT AQ NA), 2 sprays into each nostril Daily., Disp: , Rfl:   No current facility-administered medications for this visit.     Facility-Administered Medications Ordered in Other Visits:   •  Chlorhexidine Gluconate Cloth 2 % pads 1 application, 1 application, Topical, Q12H PRN, Bonita Loomis PA    PHYSICAL EXAMINATION:   There were no vitals taken for this visit.  ECOG1  GENERAL: Age appropriate. No acute distress.   HEENT: Head atraumatic, normocephalic.   NECK: Supple. No JVD. No lymphadenopathy.   LUNGS: Clear to auscultation bilaterally. No wheezing. No rhonchi.   HEART: Regular rate and rhythm. S1, S2, no murmurs.   ABDOMEN: Soft, nontender, nondistended. Bowel sounds positive. No  hepatosplenomegaly.   EXTREMITIES: No clubbing, cyanosis, +1 bilateral pedal edema.   SKIN: No rashes. No purpura.   NEUROLOGIC: Awake and oriented x3. Strength 5 out of 5 in all muscle groups.     Hospital Outpatient Visit on 07/20/2020   Component Date Value Ref Range Status   • Creatinine 07/20/2020 1.30  0.60 - 1.30 mg/dL Final    Serial Number: [id number]:  [id number]   Orders Only on  07/20/2020   Component Date Value Ref Range Status   • WBC 07/20/2020 7.77  3.40 - 10.80 10*3/mm3 Final   • RBC 07/20/2020 3.76* 3.77 - 5.28 10*6/mm3 Final   • Hemoglobin 07/20/2020 11.4* 12.0 - 15.9 g/dL Final   • Hematocrit 07/20/2020 34.1  34.0 - 46.6 % Final   • MCV 07/20/2020 90.7  79.0 - 97.0 fL Final   • MCH 07/20/2020 30.3  26.6 - 33.0 pg Final   • MCHC 07/20/2020 33.4  31.5 - 35.7 g/dL Final   • RDW 07/20/2020 12.8  12.3 - 15.4 % Final   • Platelets 07/20/2020 233  140 - 450 10*3/mm3 Final   • Neutrophil Rel % 07/20/2020 64.9  42.7 - 76.0 % Final   • Lymphocyte Rel % 07/20/2020 19.8  19.6 - 45.3 % Final   • Monocyte Rel % 07/20/2020 10.6  5.0 - 12.0 % Final   • Eosinophil Rel % 07/20/2020 4.1  0.3 - 6.2 % Final   • Basophil Rel % 07/20/2020 0.3  0.0 - 1.5 % Final   • Neutrophils Absolute 07/20/2020 5.05  1.70 - 7.00 10*3/mm3 Final   • Lymphocytes Absolute 07/20/2020 1.54  0.70 - 3.10 10*3/mm3 Final   • Monocytes Absolute 07/20/2020 0.82  0.10 - 0.90 10*3/mm3 Final   • Eosinophils Absolute 07/20/2020 0.32  0.00 - 0.40 10*3/mm3 Final   • Basophils Absolute 07/20/2020 0.02  0.00 - 0.20 10*3/mm3 Final   • Immature Granulocyte Rel % 07/20/2020 0.3  0.0 - 0.5 % Final   • Immature Grans Absolute 07/20/2020 0.02  0.00 - 0.05 10*3/mm3 Final   • nRBC 07/20/2020 0.0  0.0 - 0.2 /100 WBC Final   • Glucose 07/20/2020 103* 65 - 99 mg/dL Final   • BUN 07/20/2020 29* 8 - 23 mg/dL Final   • Creatinine 07/20/2020 1.16* 0.57 - 1.00 mg/dL Final   • eGFR Non African Am 07/20/2020 45* >60 mL/min/1.73 Final    Comment: The MDRD GFR formula is only valid for adults with stable  renal function between ages 18 and 70.     • eGFR  Am 07/20/2020 55* >60 mL/min/1.73 Final   • BUN/Creatinine Ratio 07/20/2020 25.0  7.0 - 25.0 Final   • Sodium 07/20/2020 142  136 - 145 mmol/L Final   • Potassium 07/20/2020 4.2  3.5 - 5.2 mmol/L Final   • Chloride 07/20/2020 104  98 - 107 mmol/L Final   • Total CO2 07/20/2020 28.2  22.0 - 29.0 mmol/L  Final   • Calcium 07/20/2020 9.6  8.6 - 10.5 mg/dL Final   • Total Protein 07/20/2020 6.2  6.0 - 8.5 g/dL Final   • Albumin 07/20/2020 4.20  3.50 - 5.20 g/dL Final   • Globulin 07/20/2020 2.0  gm/dL Final   • A/G Ratio 07/20/2020 2.1  g/dL Final   • Total Bilirubin 07/20/2020 0.2  0.0 - 1.2 mg/dL Final   • Alkaline Phosphatase 07/20/2020 65  39 - 117 U/L Final   • AST (SGOT) 07/20/2020 15  1 - 32 U/L Final   • ALT (SGPT) 07/20/2020 17  1 - 33 U/L Final      Ct Chest With Contrast    Result Date: 7/20/2020  Narrative: EXAMINATION: CT ABDOMEN AND PELVIS W CONTRAST-, CT CHEST W CONTRAST-07/20/2020:  INDICATION: Followup stage 1B adenocarcinoma of lungs; C79.72-Secondary malignant neoplasm of left adrenal gland.  TECHNIQUE: CT chest, abdomen and pelvis with intravenous contrast.  The radiation dose reduction device was turned on for each scan per the ALARA (As Low as Reasonably Achievable) protocol.  COMPARISON: CT dated 01/20/2020.  FINDINGS:  CHEST: The thyroid is heterogeneous in attenuation. No bulky mediastinal adenopathy. Central airways are patent. Esophagus is patulous with small hiatal hernia. Atherosclerotic, nonaneurysmal thoracic aorta with extensive calcifications including aortic valvular and coronary artery calcifications without pericardial effusion. Extended lung windows demonstrate scattered atelectasis and scarring status post left upper lobectomy without dominant nodule or mass lesion development, stable in appearance from prior comparison in overall evaluation. No pleural effusion. Degenerative changes of the thoracic spine without aggressive osseous lesion. No soft tissue body wall findings of concern specifically, no bulky axillary adenopathy.  ABDOMEN AND PELVIS:  The liver demonstrates mild diffuse low attenuation of hepatic steatosis with a stable left hemiliver low-attenuation cystic lesion. Gallbladder unremarkable. No biliary dilatation. The pancreas and spleen is unremarkable. Right adrenal  without distinct nodule and stable appearance of left adrenalectomy. Kidneys without hydronephrosis or hydroureter demonstrating atrophic appearance of the left kidney with bilateral renal cortical cysts and nonobstructing right nephrolithiasis. No bulky retroperitoneal adenopathy. Atherosclerotic nonaneurysmal abdominal aorta with patent celiac and SMA origins. Portal veins and IVC patent. GI tract evaluation without focal thickening or disproportionate dilatation of bowel. Sigmoid diverticulosis without evidence for acute diverticulitis. No free fluid or intra-abdominal free air. Pelvic viscera are grossly unremarkable without bulky pelvic adenopathy status post hysterectomy. Degenerative changes of the spine without aggressive osseous lesion. No soft tissue body wall findings of concern.      Impression: Stable appearance of the chest, abdomen and pelvis status post left upper lobectomy and left adrenalectomy without evidence for active malignancy/metastasis. No acute pathology otherwise noted.  D:  07/20/2020 E:  07/20/2020    This report was finalized on 7/20/2020 5:19 PM by Dr. Noble Harrell.      Ct Abdomen Pelvis With Contrast    Result Date: 7/20/2020  Narrative: EXAMINATION: CT ABDOMEN AND PELVIS W CONTRAST-, CT CHEST W CONTRAST-07/20/2020:  INDICATION: Followup stage 1B adenocarcinoma of lungs; C79.72-Secondary malignant neoplasm of left adrenal gland.  TECHNIQUE: CT chest, abdomen and pelvis with intravenous contrast.  The radiation dose reduction device was turned on for each scan per the ALARA (As Low as Reasonably Achievable) protocol.  COMPARISON: CT dated 01/20/2020.  FINDINGS:  CHEST: The thyroid is heterogeneous in attenuation. No bulky mediastinal adenopathy. Central airways are patent. Esophagus is patulous with small hiatal hernia. Atherosclerotic, nonaneurysmal thoracic aorta with extensive calcifications including aortic valvular and coronary artery calcifications without pericardial effusion.  Extended lung windows demonstrate scattered atelectasis and scarring status post left upper lobectomy without dominant nodule or mass lesion development, stable in appearance from prior comparison in overall evaluation. No pleural effusion. Degenerative changes of the thoracic spine without aggressive osseous lesion. No soft tissue body wall findings of concern specifically, no bulky axillary adenopathy.  ABDOMEN AND PELVIS:  The liver demonstrates mild diffuse low attenuation of hepatic steatosis with a stable left hemiliver low-attenuation cystic lesion. Gallbladder unremarkable. No biliary dilatation. The pancreas and spleen is unremarkable. Right adrenal without distinct nodule and stable appearance of left adrenalectomy. Kidneys without hydronephrosis or hydroureter demonstrating atrophic appearance of the left kidney with bilateral renal cortical cysts and nonobstructing right nephrolithiasis. No bulky retroperitoneal adenopathy. Atherosclerotic nonaneurysmal abdominal aorta with patent celiac and SMA origins. Portal veins and IVC patent. GI tract evaluation without focal thickening or disproportionate dilatation of bowel. Sigmoid diverticulosis without evidence for acute diverticulitis. No free fluid or intra-abdominal free air. Pelvic viscera are grossly unremarkable without bulky pelvic adenopathy status post hysterectomy. Degenerative changes of the spine without aggressive osseous lesion. No soft tissue body wall findings of concern.      Impression: Stable appearance of the chest, abdomen and pelvis status post left upper lobectomy and left adrenalectomy without evidence for active malignancy/metastasis. No acute pathology otherwise noted.  D:  07/20/2020 E:  07/20/2020    This report was finalized on 7/20/2020 5:19 PM by Dr. Noble Harrell.    (    ASSESSMENT: The patient is a very pleasant 76-year-old female with stage IB adenocarcinoma of the lung.    PROBLEM LIST:  1. Stage IB poorly differentiated  adenocarcinoma of the lung, [id number]:  A. Diagnosed 08/03/2015 after CT-guided biopsy.  B. Status post left upper lobe resection with mediastinal lymph node sampling  done by Dr. Herrera on 08/26/2015.  C. New right upper lobe lung nodule with left adrenal nodule, both were hypermetabolic active on PET scan done on November 6, 2017  2.  Stage IB right upper lobe adenocarcinoma [id number]:  A. presented with hypermetabolic nodule on a screening PET scan.  B.  Status post surgical resection with a clean margins done by Dr. Herrera November 30 2017  C.  Final pathology revealed 1.7 adenocarcinoma with pleural involvement negative hilar and mediastinal nodes and clear surgical margins  3.  Isolated left adrenal metastases:  A.  Status post left adrenalectomy done on February 1, 2018  B. Pathology report revealed metastatic adenocarcinoma lung primary with positive margin  C. Status post CyberKnife radiation treatment completed Traci 15, 2018  4.  Hypertension  5.  Hypercholesterolemia  6.  Heartburn  7. Knee pain    PLAN:  1.  I discussed with the patient that CAT scan chest, abdomen and pelvis showed there is no evidence of residual or recurrent disease.  I reviewed the films myself.  2.  We discussed labs are stable. She will have repeat CBC and CMP prior to return.  3.  I will order repeat CT chest, abdomen, and pelvis with contrast prior to return.  4.  The patient will follow-up with us in 6 months.  5.  We'll continue hydrochlorothiazide with Benicar for hypertension  6.  Continue simvastatin for hypercholesterolemia  7.  We'll continue omeprazole 20 mg daily for heartburn.   8. She will continue to follow up with orthopedic for management of knee pain and injections.     Bonita Mejia, APRN    7/22/2020

## 2020-08-04 ENCOUNTER — OFFICE VISIT (OUTPATIENT)
Dept: CARDIAC SURGERY | Facility: CLINIC | Age: 80
End: 2020-08-04

## 2020-08-04 VITALS
SYSTOLIC BLOOD PRESSURE: 113 MMHG | WEIGHT: 206 LBS | HEART RATE: 83 BPM | BODY MASS INDEX: 34.32 KG/M2 | HEIGHT: 65 IN | TEMPERATURE: 97.5 F | OXYGEN SATURATION: 98 % | DIASTOLIC BLOOD PRESSURE: 80 MMHG

## 2020-08-04 DIAGNOSIS — C34.90 MALIGNANT NEOPLASM OF LUNG, UNSPECIFIED LATERALITY, UNSPECIFIED PART OF LUNG (HCC): Primary | ICD-10-CM

## 2020-08-04 DIAGNOSIS — C79.72 MALIGNANT NEOPLASM METASTATIC TO LEFT ADRENAL GLAND (HCC): ICD-10-CM

## 2020-08-04 PROCEDURE — 99213 OFFICE O/P EST LOW 20 MIN: CPT | Performed by: NURSE PRACTITIONER

## 2020-08-04 NOTE — PROGRESS NOTES
[facility] Cardiothoracic Surgery Follow-Up Note    Name:  America Nix  MRN Number:  [id number]  Date of Encounter:  08/04/2020    Referred By:  No ref. provider found  PCP:  Hai Solomon MD    Chief Complaint:    Chief Complaint   Patient presents with   • Follow-up     6 month follow up for lung cancer to discuss CT chest results.   • Lung Cancer       History of Present Illness:    Ms. America Nix is a pleasant 80 y.o. female with a history of hypertension, hypercholesterolemia, GERD, metastatic left adrenal gland malignant neoplasm and metastatic primary lung cancer who underwent left upper lobectomy performed 8/26/15 revealing stage IB adenocarcinoma and 11/30/17 right upper lobectomy revealing stage IB adenocarcinoma with left adrenal metastasis status post left adrenalectomy 2/1/2018 who returns the office today for follow-up exam.    The patient denies fever, chills, lymphadenopathy, night sweats, weight loss and hemoptysis.  She continues to follow with her oncologist, Dr. Washburn and is scheduled to follow up with him on 1/20/21.  Otherwise, she is doing well    Review of Systems:  Review of Systems   Constitution: Negative. Negative for chills, fever, malaise/fatigue, night sweats and weight loss.   HENT: Negative.  Negative for hearing loss, odynophagia and sore throat.    Eyes: Negative.    Cardiovascular: Negative.  Negative for chest pain, dyspnea on exertion, leg swelling, orthopnea and palpitations.   Respiratory: Negative.  Negative for cough and hemoptysis.    Endocrine: Negative.  Negative for cold intolerance, heat intolerance, polydipsia, polyphagia and polyuria.   Hematologic/Lymphatic: Bruises/bleeds easily.   Skin: Negative.  Negative for itching and rash.   Musculoskeletal: Positive for joint pain. Negative for joint swelling and myalgias.   Gastrointestinal: Negative.  Negative for abdominal pain, constipation, diarrhea, hematemesis, hematochezia, melena, nausea  and vomiting.   Genitourinary: Negative.  Negative for dysuria, frequency and hematuria.   Neurological: Negative.  Negative for focal weakness, headaches, numbness and seizures.   Psychiatric/Behavioral: Negative.  Negative for suicidal ideas.   Allergic/Immunologic: Positive for environmental allergies.   All other systems reviewed and are negative.      Past Medical History:    Past Medical History:   Diagnosis Date   • Arthritis    • GERD (gastroesophageal reflux disease)    • Hypercholesteremia    • Hypertension    • Lung cancer (CMS/HCC) 2015    LEFT LUNG LOBECTOMY    • Lung cancer (CMS/HCC) 2017    RIGHT    • On home O2     2.5 L HS,    • Pneumothorax 2015    AFTER LUNG BIOPSY    • Seasonal allergies    • Wears dentures    • Wears glasses        Past Surgical History:    Past Surgical History:   Procedure Laterality Date   • ADRENALECTOMY Left 4/6/2018    Procedure: ADRENALECTOMY LAPAROSCOPIC LEFT;  Surgeon: Carol Ann Guzmán MD;  Location:  [facility];  Service: General   • BREAST BIOPSY Right    • BREAST LUMPECTOMY Left    • BRONCHOSCOPY N/A 11/30/2017    Procedure: BRONCHOSCOPY;  Surgeon: Jeremy Herrera MD;  Location:  [facility];  Service:    • CHEST TUBE INSERTION  2015    LEFT LUNG AFTER LUNG BIOPSY D/T PNEUMOTHORAX    • COLONOSCOPY  2015   • GOLD SEED FIDUCIAL PLACEMENT  05/21/2018    left adrenal gland   • HYSTERECTOMY     • LUNG BIOPSY Bilateral     LEFT IN 2015, RIGHT IN 2017    • LUNG LOBECTOMY Left 08/2015    [facility] PER DR HERRERA    • TEETH EXTRACTION     • THORACOSCOPY VIDEO ASSISTED WITH LOBECTOMY Right 11/30/2017    Procedure: THORACOSCOPY VIDEO ASSISTED WITH RIGHT UPPER LOBE WEDGE RESECTION AND COMPLETION OF RIGHT UPPER LOBECTOMY, MEDIASTINAL LYMPH NODE DISSECTION AND INTERCOSTAL NERVE BLOCKS;  Surgeon: Jeremy Herrera MD;  Location:  [facility];  Service:        Patient Active Problem List   Diagnosis   • HTN (hypertension)   • Hypercholesteremia   • GERD (gastroesophageal reflux disease)   • Metastatic  primary lung cancer (CMS/HCC)   • Malignant neoplasm metastatic to left adrenal gland (CMS/HCC)     Social History     Tobacco Use   • Smoking status: Former Smoker     Packs/day: 0.50     Years: 35.00     Pack years: 17.50     Last attempt to quit: 2015     Years since quittin.0   • Smokeless tobacco: Never Used   • Tobacco comment: smoked up to 2ppd at times   Substance Use Topics   • Alcohol use: No   • Drug use: No     Family History   Problem Relation Age of Onset   • Hypertension Mother    • Heart attack Mother    • Heart attack Father    • Other Sister         renal mass   • Leukemia Maternal Aunt        Medications:      Current Outpatient Medications:   •  Ascorbic Acid (VITAMIN C PO), Take 1,000 mg by mouth Daily., Disp: , Rfl:   •  aspirin 81 MG tablet, Take 81 mg by mouth Daily. Last dose 3-29-18, Disp: , Rfl:   •  Cholecalciferol (VITAMIN D3 PO), Take 5,000 Units by mouth Daily., Disp: , Rfl:   •  CRANBERRY PO, Take 1,000 mg by mouth Daily. 2, 500MG CAPSULES DAILY, Disp: , Rfl:   •  docusate sodium (COLACE) 100 MG capsule, Take 100 mg by mouth Daily., Disp: , Rfl:   •  ezetimibe (ZETIA) 10 MG tablet, Take 10 mg by mouth Every Night., Disp: , Rfl:   •  guaiFENesin (MUCINEX) 600 MG 12 hr tablet, Take 1,200 mg by mouth 2 (Two) Times a Day., Disp: , Rfl:   •  loratadine (CLARITIN) 10 MG tablet, Take 10 mg by mouth Daily., Disp: , Rfl:   •  mometasone-formoterol (DULERA 100) 100-5 MCG/ACT inhaler, Inhale 2 puffs Daily As Needed (SHORTNESS OF AIR)., Disp: , Rfl:   •  montelukast (SINGULAIR) 10 MG tablet, Take 10 mg by mouth Daily As Needed (allergies)., Disp: , Rfl:   •  Multiple Vitamin tablet, Take 1 tablet by mouth Daily., Disp: , Rfl:   •  olmesartan-hydrochlorothiazide (BENICAR HCT) 40-12.5 MG per tablet, Take 1 tablet by mouth Daily., Disp: , Rfl:   •  ondansetron (ZOFRAN) 4 MG tablet, TAKE 1 TABLET BY MOUTH THREE TIMES DAILY AS NEEDED FOR NAUSEA AND VOMITING, Disp: , Rfl:   •  simethicone  "(MYLICON) 125 MG chewable tablet, Chew 125 mg Every 6 (Six) Hours As Needed for Flatulence., Disp: , Rfl:   •  simvastatin (ZOCOR) 80 MG tablet, Take 80 mg by mouth Every Night., Disp: , Rfl:   •  Triamcinolone Acetonide (NASACORT AQ NA), 2 sprays into each nostril Daily., Disp: , Rfl:   No current facility-administered medications for this visit.     Facility-Administered Medications Ordered in Other Visits:   •  Chlorhexidine Gluconate Cloth 2 % pads 1 application, 1 application, Topical, Q12H PRN, Bonita Loomis PA    Allergies:  Allergies   Allergen Reactions   • Pneumococcal Vaccines Swelling     REDNESS AND SWELLING OF ARM    • Codeine Nausea And Vomiting   • Hydrocodone Nausea And Vomiting       Physical Exam:  Vital Signs:    Vitals:    08/04/20 1018   BP: 113/80   BP Location: Right arm   Patient Position: Sitting   Pulse: 83   Temp: 97.5 °F (36.4 °C)   SpO2: 98%   Weight: 93.4 kg (206 lb)   Height: 165.1 cm (65\")       Physical Exam  Gen- NAD, pleasant, cooperative  CV- Regular rate and rhythm, no murmur, gallop or rub  Pulm- Clear to auscultation bilateral without wheeze or rhonchi  GI- Soft, normoactive bowel sounds, non-tender  Ext- Without edema,   Neuro- CN II- XII grossly intact, tongue midline, voice normal.    Labs/Imaging:  CT abdomen and pelvis with contrast, CT chest with contrast, [facility], 7/20/2020  Impression:  Stable appearance of the chest, abdomen and pelvis status  post left upper lobectomy and left adrenalectomy without evidence for  active malignancy/metastasis. No acute pathology otherwise noted.  I personally reviewed these images in the office today.    Assessment / Plan:  Ms. America Nix is a pleasant 80 y.o. female with a history of hypertension, hypercholesterolemia, GERD, metastatic left adrenal gland malignant neoplasm and metastatic primary lung cancer who underwent left upper lobectomy performed 8/26/15 revealing stage IB adenocarcinoma and " 11/30/17 right upper lobectomy revealing stage IB adenocarcinoma with left adrenal metastasis status post left adrenalectomy 2/1/2018 who returns the office today for follow-up exam.    I discussed the results of the patient's imaging with her in the office today.  This was negative for malignancy or metastasis.  Patient is scheduled to see Dr. Washburn on 1/20/21 and is scheduled for repeat imaging at that time.  We will plan to see the patient back in our office after this appointment in January/February.  Should the patient have any acutely worsening symptoms in the interval, she will contact her office immediately.    Please note, this document was produced using voice recognition software.    JENN Jolly  [facility] Cardiothoracic Surgery

## 2021-01-18 ENCOUNTER — HOSPITAL ENCOUNTER (OUTPATIENT)
Dept: CT IMAGING | Facility: HOSPITAL | Age: 81
Discharge: HOME | End: 2021-01-18
Admitting: NURSE PRACTITIONER

## 2021-01-18 DIAGNOSIS — C79.72 MALIGNANT NEOPLASM METASTATIC TO LEFT ADRENAL GLAND (HCC): ICD-10-CM

## 2021-01-18 DIAGNOSIS — C34.12 CANCER OF UPPER LOBE OF LEFT LUNG (HCC): ICD-10-CM

## 2021-01-18 LAB — CREAT BLDA-MCNC: 1.3 MG/DL (ref 0.6–1.3)

## 2021-01-18 PROCEDURE — 25010000002 IOPAMIDOL 61 % SOLUTION: Performed by: NURSE PRACTITIONER

## 2021-01-18 PROCEDURE — 74177 CT ABD & PELVIS W/CONTRAST: CPT

## 2021-01-18 PROCEDURE — 82565 ASSAY OF CREATININE: CPT

## 2021-01-18 PROCEDURE — 71260 CT THORAX DX C+: CPT

## 2021-01-18 RX ADMIN — IOPAMIDOL 65 ML: 612 INJECTION, SOLUTION INTRAVENOUS at 09:25

## 2021-01-19 NOTE — PROGRESS NOTES
DATE OF VISIT: 1/20/2021    REASON FOR VISIT: Followup for:  A. Stage IB, [id number], poorly differentiated adenocarcinoma of the lung.  B. Stage IB [id number]  right upper lobe poorly differentiated adenocarcinoma  C. Isolated left adrenal metastases: Status post left adrenalectomy February 1, 2018, followed by CyberKnife radiation treatment.    HISTORY OF PRESENT ILLNESS: The patient is a very pleasant 76-year-old female with past medical history significant for poorly differentiated adenocarcinoma of the lung, [id number], tumor size 3.2 cm in the left upper lobe. The patient is status post left upper lobe resection with mediastinal lymph node sampling done by Dr. Jeremy Herrera on 08/26/2015. The patient is here today in scheduled followup visit.    SUBJECTIVE: The patient is here today by herself.  She is doing fairly well denies any abdominal pain no nausea vomiting.  She is anxious with the scan results.  Her breathing is stable.     REVIEW OF SYSTEMS: All the other systems are reviewed by me and negative except what is mentioned in HPI and subjectives.     PAST MEDICAL HISTORY/SOCIAL HISTORY/FAMILY HISTORY: Unchanged from my prior  documentation done on 08/13/2015.      Current Outpatient Medications:   •  Ascorbic Acid (VITAMIN C PO), Take 1,000 mg by mouth Daily., Disp: , Rfl:   •  aspirin 81 MG tablet, Take 81 mg by mouth Daily. Last dose 3-29-18, Disp: , Rfl:   •  Cholecalciferol (VITAMIN D3 PO), Take 5,000 Units by mouth Daily., Disp: , Rfl:   •  CRANBERRY PO, Take 1,000 mg by mouth Daily. 2, 500MG CAPSULES DAILY, Disp: , Rfl:   •  docusate sodium (COLACE) 100 MG capsule, Take 100 mg by mouth Daily., Disp: , Rfl:   •  ezetimibe (ZETIA) 10 MG tablet, Take 10 mg by mouth Every Night., Disp: , Rfl:   •  guaiFENesin (MUCINEX) 600 MG 12 hr tablet, Take 1,200 mg by mouth 2 (Two) Times a Day., Disp: , Rfl:   •  loratadine (CLARITIN) 10 MG tablet, Take 10 mg by mouth Daily., Disp: , Rfl:   •  mometasone-formoterol (DULERA  "100) 100-5 MCG/ACT inhaler, Inhale 2 puffs Daily As Needed (SHORTNESS OF AIR)., Disp: , Rfl:   •  montelukast (SINGULAIR) 10 MG tablet, Take 10 mg by mouth Daily As Needed (allergies)., Disp: , Rfl:   •  Multiple Vitamin tablet, Take 1 tablet by mouth Daily., Disp: , Rfl:   •  olmesartan-hydrochlorothiazide (BENICAR HCT) 40-12.5 MG per tablet, Take 1 tablet by mouth Daily., Disp: , Rfl:   •  ondansetron (ZOFRAN) 4 MG tablet, TAKE 1 TABLET BY MOUTH THREE TIMES DAILY AS NEEDED FOR NAUSEA AND VOMITING, Disp: , Rfl:   •  simethicone (MYLICON) 125 MG chewable tablet, Chew 125 mg Every 6 (Six) Hours As Needed for Flatulence., Disp: , Rfl:   •  simvastatin (ZOCOR) 80 MG tablet, Take 80 mg by mouth Every Night., Disp: , Rfl:   •  Triamcinolone Acetonide (NASACORT AQ NA), 2 sprays into each nostril Daily., Disp: , Rfl:   No current facility-administered medications for this visit.     Facility-Administered Medications Ordered in Other Visits:   •  Chlorhexidine Gluconate Cloth 2 % pads 1 application, 1 application, Topical, Q12H PRN, Bonita Loomis PA    PHYSICAL EXAMINATION:   /57   Pulse 84   Temp 97.1 °F (36.2 °C) (Temporal)   Resp 16   Ht 165.1 cm (65\")   Wt 95.3 kg (210 lb)   SpO2 97%   BMI 34.95 kg/m²   ECOG1  GENERAL: Age appropriate. No acute distress.   HEENT: Head atraumatic, normocephalic.   NECK: Supple. No JVD. No lymphadenopathy.   LUNGS: Clear to auscultation bilaterally. No wheezing. No rhonchi.   HEART: Regular rate and rhythm. S1, S2, no murmurs.   ABDOMEN: Soft, nontender, nondistended. Bowel sounds positive. No  hepatosplenomegaly.   EXTREMITIES: No clubbing, cyanosis, +1 bilateral pedal edema.   SKIN: No rashes. No purpura.   NEUROLOGIC: Awake and oriented x3. Strength 5 out of 5 in all muscle groups.     Hospital Outpatient Visit on 01/18/2021   Component Date Value Ref Range Status   • Creatinine 01/18/2021 1.30  0.60 - 1.30 mg/dL Final    Serial Number: [id number]:  [id number]      Ct " Chest With Contrast Diagnostic    Result Date: 1/19/2021  Narrative: EXAMINATION: CT CHEST W CONTRAST-, CT ABDOMEN PELVIS W CONTRAST- 01/18/2021  INDICATION: Follow up metastatic lung cancer; C34.12-Malignant neoplasm of upper lobe, left bronchus or lung; C79.72-Secondary malignant neoplasm of left adrenal gland  TECHNIQUE: Spiral acquisition 5 mm post IV contrast images through the chest, and 5 mm post IV contrast portal venous phase and delayed venous phase images through the abdomen and pelvis.  The radiation dose reduction device was turned on for each scan per the ALARA (As Low as Reasonably Achievable) protocol.  COMPARISON: 07/20/2020 chest CT scan  FINDINGS: History indicates metastatic lung cancer. Previous exam report from 07/20/2020 indicates stable post left upper lobectomy changes and left adrenalectomy. No evidence of malignancy/metastasis.  CHEST CT SCAN WITH IV CONTRAST:  Mediastinal window images today show no evidence of significant mediastinal, hilar, or axillary node or mass, pericardial or pleural effusion. Lung window images show stable peripheral interstitial lung changes, suggesting mild fibrosis. No pulmonary parenchymal mass or new pulmonary parenchymal disease is identified. Bony structures appear grossly intact.      Impression: Stable CT scan of the chest. No evidence of recurrent malignancy/metastasis, or other new disease.  ABDOMEN AND PELVIS CT SCAN WITH IV CONTRAST: Small left lobe liver cyst is stable. No new liver lesions are identified. Spleen is not enlarged. Gallbladder appears normal. Pancreas appears normal. Atrophic left kidney, and hyperplastic right kidney appears similar to the prior exam. There is a nonobstructing 2 mm right lower pole renal calculus. No upper abdominal adenopathy, ascites, or acute inflammatory change is seen. Bowel loops are normal in caliber and normal in appearance.  Regarding the lower abdomen and pelvis, there are fairly numerous sigmoid  diverticuli, but no CT scan evidence of acute diverticulitis. No intrapelvic or inguinal adenopathy is seen. Bony structures appear to be intact.  IMPRESSION: No evidence of intra-abdominal or intrapelvic metastatic disease. Atrophic left kidney, and nonobstructing small right lower pole renal calculus again noted.  D:  01/18/2021 E:  01/18/2021  This report was finalized on 1/19/2021 9:54 PM by Dr. Palmer Tolbert MD.      Ct Abdomen Pelvis With Contrast    Result Date: 1/19/2021  Narrative: EXAMINATION: CT CHEST W CONTRAST-, CT ABDOMEN PELVIS W CONTRAST- 01/18/2021  INDICATION: Follow up metastatic lung cancer; C34.12-Malignant neoplasm of upper lobe, left bronchus or lung; C79.72-Secondary malignant neoplasm of left adrenal gland  TECHNIQUE: Spiral acquisition 5 mm post IV contrast images through the chest, and 5 mm post IV contrast portal venous phase and delayed venous phase images through the abdomen and pelvis.  The radiation dose reduction device was turned on for each scan per the ALARA (As Low as Reasonably Achievable) protocol.  COMPARISON: 07/20/2020 chest CT scan  FINDINGS: History indicates metastatic lung cancer. Previous exam report from 07/20/2020 indicates stable post left upper lobectomy changes and left adrenalectomy. No evidence of malignancy/metastasis.  CHEST CT SCAN WITH IV CONTRAST:  Mediastinal window images today show no evidence of significant mediastinal, hilar, or axillary node or mass, pericardial or pleural effusion. Lung window images show stable peripheral interstitial lung changes, suggesting mild fibrosis. No pulmonary parenchymal mass or new pulmonary parenchymal disease is identified. Bony structures appear grossly intact.      Impression: Stable CT scan of the chest. No evidence of recurrent malignancy/metastasis, or other new disease.  ABDOMEN AND PELVIS CT SCAN WITH IV CONTRAST: Small left lobe liver cyst is stable. No new liver lesions are identified. Spleen is not enlarged.  Gallbladder appears normal. Pancreas appears normal. Atrophic left kidney, and hyperplastic right kidney appears similar to the prior exam. There is a nonobstructing 2 mm right lower pole renal calculus. No upper abdominal adenopathy, ascites, or acute inflammatory change is seen. Bowel loops are normal in caliber and normal in appearance.  Regarding the lower abdomen and pelvis, there are fairly numerous sigmoid diverticuli, but no CT scan evidence of acute diverticulitis. No intrapelvic or inguinal adenopathy is seen. Bony structures appear to be intact.  IMPRESSION: No evidence of intra-abdominal or intrapelvic metastatic disease. Atrophic left kidney, and nonobstructing small right lower pole renal calculus again noted.  D:  01/18/2021 E:  01/18/2021  This report was finalized on 1/19/2021 9:54 PM by Dr. Palmer Tolbert MD.    (    ASSESSMENT: The patient is a very pleasant 76-year-old female with stage IB adenocarcinoma of the lung.    PROBLEM LIST:  1. Stage IB poorly differentiated adenocarcinoma of the lung, [id number]:  A. Diagnosed 08/03/2015 after CT-guided biopsy.  B. Status post left upper lobe resection with mediastinal lymph node sampling  done by Dr. Herrera on 08/26/2015.  C. New right upper lobe lung nodule with left adrenal nodule, both were hypermetabolic active on PET scan done on November 6, 2017  2.  Stage IB right upper lobe adenocarcinoma [id number]:  A. presented with hypermetabolic nodule on a screening PET scan.  B.  Status post surgical resection with a clean margins done by Dr. Herrera November 30 2017  C.  Final pathology revealed 1.7 adenocarcinoma with pleural involvement negative hilar and mediastinal nodes and clear surgical margins  3.  Isolated left adrenal metastases:  A.  Status post left adrenalectomy done on February 1, 2018  B. Pathology report revealed metastatic adenocarcinoma lung primary with positive margin  C. Status post CyberKnife radiation treatment completed Traci 15, 2018  4.   Hypertension  5.  Hypercholesterolemia  6.  Heartburn  7. Knee pain    PLAN:  1.  I discussed with the patient that CAT scan chest, abdomen and pelvis showed there is no evidence of residual or recurrent disease.  I reviewed the films myself.  2.  We discussed labs are stable. She will have repeat CBC and CMP prior to return.  3.  I will order repeat CT chest, abdomen, and pelvis with contrast prior to return.  4.  The patient will follow-up with us in 1 year.  5.  We'll continue hydrochlorothiazide with Benicar for hypertension  6.  Continue simvastatin for hypercholesterolemia  7.  We'll continue omeprazole 20 mg daily for heartburn.   8. She will continue to follow up with orthopedic for management of knee pain and injections.     Luisana Washburn MD    1/20/2021

## 2021-01-20 ENCOUNTER — OFFICE VISIT (OUTPATIENT)
Dept: ONCOLOGY | Facility: CLINIC | Age: 81
End: 2021-01-20

## 2021-01-20 VITALS
BODY MASS INDEX: 34.99 KG/M2 | SYSTOLIC BLOOD PRESSURE: 121 MMHG | WEIGHT: 210 LBS | HEIGHT: 65 IN | TEMPERATURE: 97.1 F | HEART RATE: 84 BPM | RESPIRATION RATE: 16 BRPM | OXYGEN SATURATION: 97 % | DIASTOLIC BLOOD PRESSURE: 57 MMHG

## 2021-01-20 DIAGNOSIS — C34.12 CANCER OF UPPER LOBE OF LEFT LUNG (HCC): Primary | ICD-10-CM

## 2021-01-20 PROCEDURE — 99214 OFFICE O/P EST MOD 30 MIN: CPT | Performed by: INTERNAL MEDICINE

## 2021-02-24 ENCOUNTER — TELEPHONE (OUTPATIENT)
Dept: CARDIAC SURGERY | Facility: CLINIC | Age: 81
End: 2021-02-24

## 2021-02-24 NOTE — TELEPHONE ENCOUNTER
PATIENT READY FOR RECALL APPOINTMENT. PATIENTS INSURANCE, ADDRESS, AND PHONE NUMBER VERIFIED.    **PATIENT HAS ALREADY HAD CT OF THE CHEST W CONTRAST (ALREADY IN HER CHART)

## 2021-04-20 ENCOUNTER — OFFICE VISIT (OUTPATIENT)
Dept: CARDIAC SURGERY | Facility: CLINIC | Age: 81
End: 2021-04-20

## 2021-04-20 VITALS
TEMPERATURE: 97.1 F | BODY MASS INDEX: 35.32 KG/M2 | HEIGHT: 65 IN | DIASTOLIC BLOOD PRESSURE: 80 MMHG | WEIGHT: 212 LBS | SYSTOLIC BLOOD PRESSURE: 130 MMHG | HEART RATE: 67 BPM | OXYGEN SATURATION: 98 %

## 2021-04-20 DIAGNOSIS — C34.90 MALIGNANT NEOPLASM OF LUNG, UNSPECIFIED LATERALITY, UNSPECIFIED PART OF LUNG (HCC): Primary | ICD-10-CM

## 2021-04-20 PROCEDURE — 99213 OFFICE O/P EST LOW 20 MIN: CPT | Performed by: NURSE PRACTITIONER

## 2021-04-20 NOTE — PROGRESS NOTES
[facility] Cardiothoracic Surgery Office Follow Up Note     Date of Encounter: 04/20/2021     MRN Number: [id number]  Name: America Nix  Phone Number: [phone]     Referred By: No ref. provider found  PCP: Hai Solomon MD    Chief Complaint:    Chief Complaint   Patient presents with   • Follow-up     6 MO FU with CT Chest / Abdomen / Pelvis for Left Lung, Adrenal Gland Cancer       History of Present Illness:    America Nix is a 81 y.o. female with a history of hypertension, hypercholesterolemia, stage Ib left lung adenocarcinoma s/p left upper lobectomy on 8/26/2015 with Dr. Herrera and stage Ib right lung adenocarcinoma s/p right upper lobectomy on 11/30/2017 with isolated left adrenal adenocarcinoma metastasis s/p left adrenalectomy on 2/1/2018 with positive margin s/p cyberknife.  Presents today in 6 month follow-up.  Last seen in the office on 8/4/2020.  Since last office visit, patient has been doing well.  She denies any cough, hemoptysis, unexplained weight loss or shortness of breath.  Is following closely with Dr. Washburn and saw him last in January with plans for 1 year follow-up with repeat CT imaging.    Review of Systems:  Review of Systems   Constitutional: Negative for chills, decreased appetite, diaphoresis, fever, malaise/fatigue, night sweats and weight loss.   HENT: Negative for congestion, hoarse voice, sore throat and stridor.    Cardiovascular: Positive for leg swelling. Negative for chest pain, claudication, dyspnea on exertion, irregular heartbeat, near-syncope, orthopnea, palpitations, paroxysmal nocturnal dyspnea and syncope.   Respiratory: Negative for cough, hemoptysis, shortness of breath, sleep disturbances due to breathing, snoring, sputum production and wheezing.    Hematologic/Lymphatic: Negative for adenopathy and bleeding problem. Bruises/bleeds easily.   Skin: Negative for color change, dry skin, itching, poor wound healing and rash.    Musculoskeletal: Positive for arthritis and joint pain. Negative for back pain, falls and muscle weakness.   Gastrointestinal: Negative for abdominal pain, anorexia, constipation, diarrhea, hematochezia, melena, nausea and vomiting.   Neurological: Negative for difficulty with concentration, disturbances in coordination, dizziness, loss of balance, numbness, seizures, vertigo and weakness.   Psychiatric/Behavioral: Negative for altered mental status, depression, memory loss and substance abuse. The patient does not have insomnia and is not nervous/anxious.    Allergic/Immunologic: Negative for persistent infections.       I have reviewed the review of systems as entered by my clinical support staff and have updated it as appropriate.       Allergies:  Allergies   Allergen Reactions   • Pneumococcal Vaccines Swelling     REDNESS AND SWELLING OF ARM    • Codeine Nausea And Vomiting   • Hydrocodone Nausea And Vomiting       Medications:      Current Outpatient Medications:   •  Ascorbic Acid (VITAMIN C PO), Take 1,000 mg by mouth Daily., Disp: , Rfl:   •  aspirin 81 MG tablet, Take 81 mg by mouth Daily. Last dose 3-29-18, Disp: , Rfl:   •  Cholecalciferol (VITAMIN D3 PO), Take 5,000 Units by mouth Daily., Disp: , Rfl:   •  CRANBERRY PO, Take 1,000 mg by mouth Daily. 2, 500MG CAPSULES DAILY, Disp: , Rfl:   •  docusate sodium (COLACE) 100 MG capsule, Take 100 mg by mouth Daily., Disp: , Rfl:   •  ezetimibe (ZETIA) 10 MG tablet, Take 10 mg by mouth Every Night., Disp: , Rfl:   •  guaiFENesin (MUCINEX) 600 MG 12 hr tablet, Take 1,200 mg by mouth 2 (Two) Times a Day., Disp: , Rfl:   •  loratadine (CLARITIN) 10 MG tablet, Take 10 mg by mouth Daily., Disp: , Rfl:   •  mometasone-formoterol (DULERA 100) 100-5 MCG/ACT inhaler, Inhale 2 puffs Daily As Needed (SHORTNESS OF AIR)., Disp: , Rfl:   •  montelukast (SINGULAIR) 10 MG tablet, Take 10 mg by mouth Daily As Needed (allergies)., Disp: , Rfl:   •  Multiple Vitamin tablet,  Take 1 tablet by mouth Daily., Disp: , Rfl:   •  olmesartan-hydrochlorothiazide (BENICAR HCT) 40-12.5 MG per tablet, Take 1 tablet by mouth Daily., Disp: , Rfl:   •  ondansetron (ZOFRAN) 4 MG tablet, TAKE 1 TABLET BY MOUTH THREE TIMES DAILY AS NEEDED FOR NAUSEA AND VOMITING, Disp: , Rfl:   •  simethicone (MYLICON) 125 MG chewable tablet, Chew 125 mg Every 6 (Six) Hours As Needed for Flatulence., Disp: , Rfl:   •  simvastatin (ZOCOR) 80 MG tablet, Take 80 mg by mouth Every Night., Disp: , Rfl:   •  Triamcinolone Acetonide (NASACORT AQ NA), 2 sprays into each nostril Daily., Disp: , Rfl:   No current facility-administered medications for this visit.    Facility-Administered Medications Ordered in Other Visits:   •  Chlorhexidine Gluconate Cloth 2 % pads 1 application, 1 application, Topical, Q12H PRN, Bonita Loomis PA    Social History     Socioeconomic History   • Marital status:      Spouse name: Hardy   • Number of children: 0   • Years of education: Not on file   • Highest education level: Not on file   Tobacco Use   • Smoking status: Former Smoker     Packs/day: 0.50     Years: 35.00     Pack years: 17.50     Types: Cigarettes     Quit date: 2015     Years since quittin.7   • Smokeless tobacco: Never Used   • Tobacco comment: smoked up to 2ppd at times   Substance and Sexual Activity   • Alcohol use: No   • Drug use: No   • Sexual activity: Defer       Family History   Problem Relation Age of Onset   • Hypertension Mother    • Heart attack Mother    • Heart attack Father    • Other Sister         renal mass   • Leukemia Maternal Aunt        Past Medical History:   Diagnosis Date   • Arthritis    • GERD (gastroesophageal reflux disease)    • Hypercholesteremia    • Hypertension    • Lung cancer (CMS/HCC) 2015    LEFT LUNG LOBECTOMY    • Lung cancer (CMS/HCC) 2017    RIGHT    • On home O2     2.5 L HS,    • Pneumothorax 2015    AFTER LUNG BIOPSY    • Seasonal allergies    • Wears dentures    •  "Wears glasses        Past Surgical History:   Procedure Laterality Date   • ADRENALECTOMY Left 4/6/2018    Procedure: ADRENALECTOMY LAPAROSCOPIC LEFT;  Surgeon: Carol Ann Guzmán MD;  Location:  [facility];  Service: General   • BREAST BIOPSY Right    • BREAST LUMPECTOMY Left    • BRONCHOSCOPY N/A 11/30/2017    Procedure: BRONCHOSCOPY;  Surgeon: Jeremy Herrera MD;  Location:  [facility];  Service:    • CHEST TUBE INSERTION  2015    LEFT LUNG AFTER LUNG BIOPSY D/T PNEUMOTHORAX    • COLONOSCOPY  2015   • GOLD SEED FIDUCIAL PLACEMENT  05/21/2018    left adrenal gland   • HYSTERECTOMY     • LUNG BIOPSY Bilateral     LEFT IN 2015, RIGHT IN 2017    • LUNG LOBECTOMY Left 08/2015    [facility] PER DR HERRERA    • TEETH EXTRACTION     • THORACOSCOPY VIDEO ASSISTED WITH LOBECTOMY Right 11/30/2017    Procedure: THORACOSCOPY VIDEO ASSISTED WITH RIGHT UPPER LOBE WEDGE RESECTION AND COMPLETION OF RIGHT UPPER LOBECTOMY, MEDIASTINAL LYMPH NODE DISSECTION AND INTERCOSTAL NERVE BLOCKS;  Surgeon: Jeremy Herrera MD;  Location:  [facility];  Service:        Physical Exam:  Vital Signs:    Vitals:    04/20/21 0818   BP: 130/80   BP Location: Left arm   Patient Position: Sitting   Pulse: 67   Temp: 97.1 °F (36.2 °C)   SpO2: 98%   Weight: 96.2 kg (212 lb)   Height: 165.1 cm (65\")     Body mass index is 35.28 kg/m².     Physical Exam  Vitals and nursing note reviewed.   Constitutional:       Appearance: Normal appearance. She is well-developed and well-groomed.   HENT:      Head: Normocephalic and atraumatic.   Cardiovascular:      Rate and Rhythm: Normal rate and regular rhythm.      Heart sounds: Normal heart sounds, S1 normal and S2 normal. No murmur heard.   No friction rub.   Pulmonary:      Comments: Unlabored, Clear to auscultation bilaterally  Abdominal:      General: Bowel sounds are normal.      Palpations: Abdomen is soft.      Tenderness: There is no abdominal tenderness.   Musculoskeletal:      Cervical back: Neck supple.      Right lower leg: " No edema.      Left lower leg: No edema.   Lymphadenopathy:      Cervical: No cervical adenopathy.   Skin:     General: Skin is warm and dry.      Comments: Incisions: Bilateral VATS site intact  No surrounding erythema, masses or induration   Neurological:      Mental Status: She is alert and oriented to person, place, and time.   Psychiatric:         Attention and Perception: Attention normal.         Mood and Affect: Mood normal.         Speech: Speech normal.         Behavior: Behavior is cooperative.         Labs/Imagin/18/21 CT chest/abd/pelvis:  CHEST CT SCAN WITH IV CONTRAST:  Mediastinal window images today show no  evidence of significant mediastinal, hilar, or axillary node or mass,  pericardial or pleural effusion. Lung window images show stable  peripheral interstitial lung changes, suggesting mild fibrosis. No  pulmonary parenchymal mass or new pulmonary parenchymal disease is  identified. Bony structures appear grossly intact.     IMPRESSION:  Stable CT scan of the chest. No evidence of recurrent  malignancy/metastasis, or other new disease.     ABDOMEN AND PELVIS CT SCAN WITH IV CONTRAST: Small left lobe liver cyst  is stable. No new liver lesions are identified. Spleen is not enlarged.  Gallbladder appears normal. Pancreas appears normal. Atrophic left  kidney, and hyperplastic right kidney appears similar to the prior exam.  There is a nonobstructing 2 mm right lower pole renal calculus. No upper  abdominal adenopathy, ascites, or acute inflammatory change is seen.  Bowel loops are normal in caliber and normal in appearance.     Regarding the lower abdomen and pelvis, there are fairly numerous  sigmoid diverticuli, but no CT scan evidence of acute diverticulitis. No  intrapelvic or inguinal adenopathy is seen. Bony structures appear to be  intact.     IMPRESSION: No evidence of intra-abdominal or intrapelvic metastatic  disease. Atrophic left kidney, and nonobstructing small right lower  pole  renal calculus again noted.  Personally reviewed    Assessment / Plan:  Diagnoses and all orders for this visit:    1. Malignant neoplasm of lung, unspecified laterality, unspecified part of lung (CMS/HCC) (Primary)    America Nix is a 81 y.o. female with a history of hypertension, hypercholesterolemia, stage Ib left lung adenocarcinoma s/p left upper lobectomy on 8/26/2015 with Dr. Herrera and stage Ib right lung adenocarcinoma s/p right upper lobectomy on 11/30/2017 with isolated left adrenal adenocarcinoma metastasis s/p left adrenalectomy on 2/1/2018 with positive margin s/p cyberknife.  Myself and Dr. Herrera discussed findings of stable CT chest with no recurrence or metastasis.  Following closely with Dr. Washburn.  We will follow-up with patient in 1 year with repeat CT chest if not already ordered by Dr. Washburn.    America Nix  reports that she quit smoking about 5 years ago. Her smoking use included cigarettes. She has a 17.50 pack-year smoking history. She has never used smokeless tobacco.     JENN Clayton  [facility] Cardioth[facility]    Please note that portions of this note may have been completed with a voice recognition program. Efforts were made to edit the dictations, but occasionally words are mistranscribed.

## 2021-04-29 ENCOUNTER — TRANSCRIBE ORDERS (OUTPATIENT)
Dept: GENERAL RADIOLOGY | Facility: HOSPITAL | Age: 81
End: 2021-04-29

## 2021-04-29 ENCOUNTER — HOSPITAL ENCOUNTER (OUTPATIENT)
Dept: GENERAL RADIOLOGY | Facility: HOSPITAL | Age: 81
Discharge: HOME | End: 2021-04-29
Admitting: FAMILY MEDICINE

## 2021-04-29 DIAGNOSIS — R05.9 COUGH: Primary | ICD-10-CM

## 2021-04-29 PROCEDURE — 71046 X-RAY EXAM CHEST 2 VIEWS: CPT

## 2021-05-17 ENCOUNTER — HOSPITAL ENCOUNTER (OUTPATIENT)
Dept: GENERAL RADIOLOGY | Facility: HOSPITAL | Age: 81
Discharge: HOME | End: 2021-05-17
Admitting: FAMILY MEDICINE

## 2021-05-17 ENCOUNTER — TRANSCRIBE ORDERS (OUTPATIENT)
Dept: GENERAL RADIOLOGY | Facility: HOSPITAL | Age: 81
End: 2021-05-17

## 2021-05-17 DIAGNOSIS — J18.9 PNEUMONIA DUE TO INFECTIOUS ORGANISM, UNSPECIFIED LATERALITY, UNSPECIFIED PART OF LUNG: ICD-10-CM

## 2021-05-17 PROCEDURE — 71046 X-RAY EXAM CHEST 2 VIEWS: CPT

## 2021-11-30 ENCOUNTER — HOSPITAL ENCOUNTER (OUTPATIENT)
Dept: ULTRASOUND IMAGING | Facility: HOSPITAL | Age: 81
Discharge: HOME | End: 2021-11-30
Payer: MEDICARE

## 2021-11-30 DIAGNOSIS — R09.89 BRUIT: ICD-10-CM

## 2021-11-30 DIAGNOSIS — E04.2 MULTIPLE THYROID NODULES: ICD-10-CM

## 2021-11-30 PROCEDURE — 93880 EXTRACRANIAL BILAT STUDY: CPT

## 2021-11-30 PROCEDURE — 76536 US EXAM OF HEAD AND NECK: CPT

## 2022-01-03 ENCOUNTER — TRANSCRIBE ORDERS (OUTPATIENT)
Dept: GENERAL RADIOLOGY | Facility: HOSPITAL | Age: 82
End: 2022-01-03

## 2022-01-03 ENCOUNTER — HOSPITAL ENCOUNTER (OUTPATIENT)
Dept: GENERAL RADIOLOGY | Facility: HOSPITAL | Age: 82
Discharge: HOME | End: 2022-01-03
Admitting: PHYSICIAN ASSISTANT

## 2022-01-03 DIAGNOSIS — R05.1 ACUTE COUGH: Primary | ICD-10-CM

## 2022-01-03 PROCEDURE — 71046 X-RAY EXAM CHEST 2 VIEWS: CPT

## 2022-01-17 ENCOUNTER — APPOINTMENT (OUTPATIENT)
Dept: CT IMAGING | Facility: HOSPITAL | Age: 82
End: 2022-01-17

## 2022-01-20 ENCOUNTER — TELEPHONE (OUTPATIENT)
Dept: ONCOLOGY | Facility: CLINIC | Age: 82
End: 2022-01-20

## 2022-01-20 NOTE — TELEPHONE ENCOUNTER
Caller: America Nix    Relationship: Self    Best call back number: [phone]    What is the best time to reach you: ANYTIME    Who are you requesting to speak with (clinical staff, provider,  specific staff member): SCHEDULING    What was the call regarding: PT NEEDS TO R/S HER 1/17 CT SCAN AND HER 1/19 F/U WITH [facility] . SHE PREFERS [facility].     Do you require a callback: YES

## 2022-01-24 ENCOUNTER — TELEPHONE (OUTPATIENT)
Dept: ONCOLOGY | Facility: CLINIC | Age: 82
End: 2022-01-24

## 2022-01-24 DIAGNOSIS — C79.72 MALIGNANT NEOPLASM METASTATIC TO LEFT ADRENAL GLAND: Primary | ICD-10-CM

## 2022-01-24 DIAGNOSIS — C34.12 CANCER OF UPPER LOBE OF LEFT LUNG: ICD-10-CM

## 2022-01-24 NOTE — TELEPHONE ENCOUNTER
PATIENT CALLED TO RETURN CALL REGARDING HER APPTS THAT NEEDED TO BE RESCHEDULED.  SHE WILL BE AVAILABLE FOR THE REMAINDER OF THE DAY.      PLEASE CALL TO ADVISE.

## 2022-01-27 ENCOUNTER — HOSPITAL ENCOUNTER (OUTPATIENT)
Dept: CT IMAGING | Facility: HOSPITAL | Age: 82
Discharge: HOME | End: 2022-01-27
Admitting: INTERNAL MEDICINE

## 2022-01-27 ENCOUNTER — APPOINTMENT (OUTPATIENT)
Dept: CT IMAGING | Facility: HOSPITAL | Age: 82
End: 2022-01-27

## 2022-01-27 DIAGNOSIS — C34.12 CANCER OF UPPER LOBE OF LEFT LUNG: ICD-10-CM

## 2022-01-27 DIAGNOSIS — C79.72 MALIGNANT NEOPLASM METASTATIC TO LEFT ADRENAL GLAND: ICD-10-CM

## 2022-01-27 LAB — CREAT BLDA-MCNC: 1.3 MG/DL (ref 0.6–1.3)

## 2022-01-27 PROCEDURE — 25010000002 IOPAMIDOL 61 % SOLUTION: Performed by: INTERNAL MEDICINE

## 2022-01-27 PROCEDURE — 71260 CT THORAX DX C+: CPT

## 2022-01-27 PROCEDURE — 82565 ASSAY OF CREATININE: CPT

## 2022-01-27 PROCEDURE — 74177 CT ABD & PELVIS W/CONTRAST: CPT

## 2022-01-27 RX ADMIN — IOPAMIDOL 80 ML: 612 INJECTION, SOLUTION INTRAVENOUS at 09:52

## 2022-02-02 ENCOUNTER — OFFICE VISIT (OUTPATIENT)
Dept: ONCOLOGY | Facility: CLINIC | Age: 82
End: 2022-02-02

## 2022-02-02 VITALS
RESPIRATION RATE: 16 BRPM | SYSTOLIC BLOOD PRESSURE: 120 MMHG | OXYGEN SATURATION: 96 % | BODY MASS INDEX: 35.16 KG/M2 | WEIGHT: 211 LBS | HEIGHT: 65 IN | DIASTOLIC BLOOD PRESSURE: 89 MMHG | TEMPERATURE: 98 F | HEART RATE: 94 BPM

## 2022-02-02 DIAGNOSIS — C79.72 MALIGNANT NEOPLASM METASTATIC TO LEFT ADRENAL GLAND: ICD-10-CM

## 2022-02-02 DIAGNOSIS — C34.90 PRIMARY MALIGNANT NEOPLASM OF LUNG METASTATIC TO OTHER SITE, UNSPECIFIED LATERALITY: Primary | ICD-10-CM

## 2022-02-02 PROCEDURE — 99214 OFFICE O/P EST MOD 30 MIN: CPT | Performed by: NURSE PRACTITIONER

## 2022-02-02 RX ORDER — OXYBUTYNIN CHLORIDE 10 MG/1
10 TABLET, EXTENDED RELEASE ORAL DAILY
COMMUNITY

## 2022-02-02 NOTE — PROGRESS NOTES
DATE OF VISIT: 2/2/2022    REASON FOR VISIT: Followup for:  A. Stage IB, [id number], poorly differentiated adenocarcinoma of the lung.  B. Stage IB [id number]  right upper lobe poorly differentiated adenocarcinoma  C. Isolated left adrenal metastases: Status post left adrenalectomy February 1, 2018, followed by CyberKnife radiation treatment.    HISTORY OF PRESENT ILLNESS: The patient is a very pleasant 82 y.o.  female with past medical history significant for poorly differentiated adenocarcinoma of the lung, [id number], tumor size 3.2 cm in the left upper lobe. The patient is status post left upper lobe resection with mediastinal lymph node sampling done by Dr. Jeremy Herrera on 08/26/2015. The patient is here today in scheduled followup visit.    SUBJECTIVE: The patient is here today by herself.  She is doing fairly well.  Denies any abdominal pain. No nausea vomiting.  She is anxious with the scan results.  Her breathing is a little worse than normal, but hse is battling pneumonia for the last 3 weeks. She is better and breathing better since starting steroids and  Nebulizer.      REVIEW OF SYSTEMS: All the other systems are reviewed by me and negative except what is mentioned in HPI and subjectives.     PAST MEDICAL HISTORY/SOCIAL HISTORY/FAMILY HISTORY: Unchanged from my prior  documentation done on 08/13/2015.      Current Outpatient Medications:   •  Ascorbic Acid (VITAMIN C PO), Take 1,000 mg by mouth Daily., Disp: , Rfl:   •  aspirin 81 MG tablet, Take 81 mg by mouth Daily. Last dose 3-29-18, Disp: , Rfl:   •  Aspirin Buf,CaCarb-MgCarb-MgO, 81 MG tablet, aspirin 81 mg tablet  Daily, Disp: , Rfl:   •  Cholecalciferol (VITAMIN D3 PO), Take 5,000 Units by mouth Daily., Disp: , Rfl:   •  CRANBERRY PO, Take 1,000 mg by mouth Daily. 2, 500MG CAPSULES DAILY, Disp: , Rfl:   •  docusate sodium (COLACE) 100 MG capsule, Take 100 mg by mouth Daily., Disp: , Rfl:   •  ezetimibe (ZETIA) 10 MG tablet, Take 10 mg by mouth Every  "Night., Disp: , Rfl:   •  guaiFENesin (MUCINEX) 600 MG 12 hr tablet, Take 1,200 mg by mouth 2 (Two) Times a Day., Disp: , Rfl:   •  loratadine (CLARITIN) 10 MG tablet, Take 10 mg by mouth Daily., Disp: , Rfl:   •  mometasone-formoterol (DULERA 100) 100-5 MCG/ACT inhaler, Inhale 2 puffs Daily As Needed (SHORTNESS OF AIR)., Disp: , Rfl:   •  montelukast (SINGULAIR) 10 MG tablet, Take 10 mg by mouth Daily As Needed (allergies)., Disp: , Rfl:   •  Multiple Vitamin tablet, Take 1 tablet by mouth Daily., Disp: , Rfl:   •  olmesartan-hydrochlorothiazide (BENICAR HCT) 40-12.5 MG per tablet, Take 1 tablet by mouth Daily. 1/2 tab in the AM and 1/2 tab in the PM, Disp: , Rfl:   •  ondansetron (ZOFRAN) 4 MG tablet, TAKE 1 TABLET BY MOUTH THREE TIMES DAILY AS NEEDED FOR NAUSEA AND VOMITING, Disp: , Rfl:   •  oxybutynin XL (DITROPAN-XL) 5 MG 24 hr tablet, oxybutynin chloride ER 5 mg tablet,extended release 24 hr  TAKE 1 TABLET BY MOUTH ONCE DAILY, Disp: , Rfl:   •  simethicone (MYLICON) 125 MG chewable tablet, Chew 125 mg Every 6 (Six) Hours As Needed for Flatulence., Disp: , Rfl:   •  simvastatin (ZOCOR) 80 MG tablet, Take 80 mg by mouth Every Night., Disp: , Rfl:   •  Triamcinolone Acetonide (NASACORT AQ NA), 2 sprays into each nostril Daily., Disp: , Rfl:   No current facility-administered medications for this visit.    Facility-Administered Medications Ordered in Other Visits:   •  Chlorhexidine Gluconate Cloth 2 % pads 1 application, 1 application, Topical, Q12H PRN, Bonita Loomis PA    PHYSICAL EXAMINATION:   /89   Pulse 94   Temp 98 °F (36.7 °C) (Temporal)   Resp 16   Ht 165.1 cm (65\")   Wt 95.7 kg (211 lb)   SpO2 96%   BMI 35.11 kg/m²   ECOG1  GENERAL: Age appropriate. No acute distress.   HEENT: Head atraumatic, normocephalic.   NECK: Supple. No JVD. No lymphadenopathy.   LUNGS: Clear to auscultation bilaterally. No wheezing. No rhonchi.   HEART: Regular rate and rhythm. S1, S2, no murmurs.   ABDOMEN: " Soft, nontender, nondistended. Bowel sounds positive. No  hepatosplenomegaly.   EXTREMITIES: No clubbing, cyanosis, +1 bilateral pedal edema.   SKIN: No rashes. No purpura.   NEUROLOGIC: Awake and oriented x3. Strength 5 out of 5 in all muscle groups.     Hospital Outpatient Visit on 01/27/2022   Component Date Value Ref Range Status   • Creatinine 01/27/2022 1.30  0.60 - 1.30 mg/dL Final    Serial Number: [id number]:  [id number]      CT Chest With Contrast Diagnostic, CT Abdomen Pelvis With Contrast    Result Date: 1/27/2022  Narrative: EXAMINATION: CT ABDOMEN PELVIS W CONTRAST-, CT CHEST W CONTRAST DIAGNOSTIC-  INDICATION: f/u scans; C79.72-Secondary malignant neoplasm of left adrenal gland; C34.12-Malignant neoplasm of upper lobe, left bronchus or lung  TECHNIQUE: Axial IV contrast-enhanced CT of the chest, abdomen and pelvis with multiplanar reconstruction  The radiation dose reduction device was turned on for each scan per the ALARA (As Low as Reasonably Achievable) protocol.  COMPARISON: 1/18/2021  FINDINGS: Chest: No pathologic axillary adenopathy or other worrisome body wall soft tissue finding in the chest. No pathologic mediastinal or hilar lymphadenopathy. No pleural or pericardial effusion. Evaluation of the osseous structures demonstrates no evidence of acute fracture or aggressive osseous lesion. Atherosclerotic nonaneurysmal thoracic aorta. Evaluation of the lung fields demonstrates similar appearance of the lung parenchyma including chronic interstitial changes with some peripheral reticulation noted posteriorly in the right and minimal scattered areas of intervening groundglass opacity. There is otherwise no evidence of acute infectious process or recurrent suspicious pulmonary nodularity.  Abdomen pelvis: Body wall soft tissues are unremarkable. The osseous structures demonstrate no evidence of fracture or suspicious osseous lesion. The liver, spleen and pancreas demonstrate homogeneous enhancement  without evidence of suspicious focal lesion. Postoperative changes in the region of the left adrenal gland are redemonstrated, without definite recurrent mass or abnormal soft tissue present. There is unchanged atrophic appearance of the right kidney. No evidence of mass, hydronephrosis or nephropathy on the right. The right adrenal gland is unremarkable. Small and large bowel loops are nondilated. There is no suspicious focal bowel wall thickening. No free fluid or pneumoperitoneum. Redemonstrated colonic diverticulosis changes, without inflammatory signs of diverticulitis. The pelvic viscera are unremarkable. Nonaneurysmal atherosclerotic abdominal aorta. No bulky retroperitoneal lymphadenopathy.      Impression: Stable CT appearance of the chest, abdomen and pelvis. There is no evidence of recurrent or metastatic disease.  No evidence of recurrence or new metastatic involvement in the abdomen and pelvis. Stable postoperative changes in the region of the left adrenal gland.   This report was finalized on 1/27/2022 11:08 AM by Eddy Hull.      XR Chest PA & Lateral    Result Date: 1/3/2022  Narrative: EXAMINATION: XR CHEST PA AND LATERAL- 01/03/2022  INDICATION:  R05.1-Acute cough  COMPARISON: Chest x-ray 05/17/2021  FINDINGS: Cardiac size within normal limits. Increased perihilar and midlung opacifications prominence of airspace disease and/or trace central vascular congestion without effusion. Continued asymmetric elevation of the left hemidiaphragm.        Impression: Increased perihilar and midlung opacifications prominence of airspace disease and/or trace central vascular congestion without effusion.  D:  01/03/2022 E:  01/03/2022  This report was finalized on 1/3/2022 2:13 PM by Dr. Noble Harrell.    (    ASSESSMENT: The patient is a very pleasant 76-year-old female with stage IB adenocarcinoma of the lung.    PROBLEM LIST:  1. Stage IB poorly differentiated adenocarcinoma of the lung, [id number]:  A.  Diagnosed 08/03/2015 after CT-guided biopsy.  B. Status post left upper lobe resection with mediastinal lymph node sampling  done by Dr. Herrera on 08/26/2015.  C. New right upper lobe lung nodule with left adrenal nodule, both were hypermetabolic active on PET scan done on November 6, 2017  2.  Stage IB right upper lobe adenocarcinoma [id number]:  A. presented with hypermetabolic nodule on a screening PET scan.  B.  Status post surgical resection with a clean margins done by Dr. Herrera November 30 2017  C.  Final pathology revealed 1.7 adenocarcinoma with pleural involvement negative hilar and mediastinal nodes and clear surgical margins  3.  Isolated left adrenal metastases:  A.  Status post left adrenalectomy done on February 1, 2018  B. Pathology report revealed metastatic adenocarcinoma lung primary with positive margin  C. Status post CyberKnife radiation treatment completed Traci 15, 2018  4.  Hypertension  5.  Hypercholesterolemia  6.  Heartburn  7. Knee pain  8. Pneumonia    PLAN:  1.  I discussed with the patient that CT scan chest, abdomen and pelvis showed there is no evidence of residual or recurrent disease.  I reviewed the films myself.  2.  No recent labs. She had labs at Dr. Solomon's office. I will ask my office to obtain labs form his office.   She will have repeat CBC and CMP prior to return.  3.  I will order repeat CT chest, abdomen, and pelvis with contrast prior to return.  4.  The patient will follow-up with us in 1 year.  5.  We'll continue hydrochlorothiazide with Benicar for hypertension  6.  Continue simvastatin for hypercholesterolemia  7.  We'll continue omeprazole 20 mg daily for heartburn.   8. She will continue to follow up with orthopedic for management of knee pain and injections.   9. Continue steroids and breathing treatment for management of pneumonia.       Bonita Mejia, APRN    2/2/2022

## 2022-03-25 ENCOUNTER — OFFICE VISIT (OUTPATIENT)
Dept: PULMONOLOGY | Facility: CLINIC | Age: 82
End: 2022-03-25

## 2022-03-25 VITALS
DIASTOLIC BLOOD PRESSURE: 72 MMHG | HEART RATE: 80 BPM | WEIGHT: 218 LBS | OXYGEN SATURATION: 98 % | HEIGHT: 65 IN | BODY MASS INDEX: 36.32 KG/M2 | SYSTOLIC BLOOD PRESSURE: 130 MMHG

## 2022-03-25 DIAGNOSIS — J30.1 SEASONAL ALLERGIC RHINITIS DUE TO POLLEN: ICD-10-CM

## 2022-03-25 DIAGNOSIS — G47.19 DAYTIME HYPERSOMNOLENCE: ICD-10-CM

## 2022-03-25 DIAGNOSIS — K21.9 GERD WITHOUT ESOPHAGITIS: ICD-10-CM

## 2022-03-25 DIAGNOSIS — J43.2 CENTRILOBULAR EMPHYSEMA: Primary | ICD-10-CM

## 2022-03-25 PROCEDURE — 99204 OFFICE O/P NEW MOD 45 MIN: CPT | Performed by: INTERNAL MEDICINE

## 2022-03-25 RX ORDER — OMEPRAZOLE 20 MG/1
20 CAPSULE, DELAYED RELEASE ORAL AS NEEDED
COMMUNITY

## 2022-03-25 RX ORDER — PREDNISONE 10 MG/1
TABLET ORAL
COMMUNITY
Start: 2022-02-24 | End: 2022-05-10

## 2022-03-25 RX ORDER — ALBUTEROL SULFATE 2.5 MG/3ML
SOLUTION RESPIRATORY (INHALATION)
COMMUNITY
Start: 2022-03-01

## 2022-03-25 RX ORDER — AMLODIPINE BESYLATE 5 MG/1
5 TABLET ORAL DAILY
COMMUNITY
Start: 2022-03-16 | End: 2022-06-10

## 2022-03-25 NOTE — PROGRESS NOTES
New Pulmonary Patient Office Visit      Patient Name: America Nix    Referring Physician: Hai Solomon*    Chief Complaint:    Chief Complaint   Patient presents with   • Consult   • Cough       History of Present Illness: America Nix is a 82 y.o. female who is here today to establish care with Pulmonary for history of pneumonia.     She had pneumonia around Christmas 2021 and it took her over 3 weeks to get over it.  She required antibiotics and steroids for treatment.  She has required 3 prednisone tapers over the last year.  She was never diagnosed with asthma as a child.    She has COPD and was diagnosed in 2015 at the time of her first cancer diagnosis  She was on Dulera 100 and was switched to Breztri for a few weeks and has noticed improved symptoms with it.  She works around her house, but goes at her own pace.  She is able to clean her house for 30+ minutes at a time.  She denies any significant weight loss.    She does have loud snoring and daytime hypersomnolence.  Never had sleep study.    PMH:   1. 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 Status post left upper lobe resection with mediastinal lymph node samplingdone by Dr. Herrera on 08/26/2015.  2. Stage IB right upper lobe adenocarcinoma [id number],  Status post surgical resection with a clean margins done by Dr. Herrera November 30 2017, 1.7 adenocarcinoma with pleural involvement negative hilar and mediastinal nodes and clear surgical margins  3.  Isolated left adrenal metastases, s/p left adrenalectomy done on February 1, 2018 with positive margin,  Status post CyberKnife radiation treatment completed Traci 15, 2018         Subjective      Review of Systems:   Review of Systems   Constitutional: Positive for fatigue.   Respiratory: Positive for cough, shortness of breath and wheezing.    Gastrointestinal: Positive for GERD.   Allergic/Immunologic: Positive for  environmental allergies.       Past Medical History:   Past Medical History:   Diagnosis Date   • Arthritis    • GERD (gastroesophageal reflux disease)    • Hypercholesteremia    • Hypertension    • Lung cancer (HCC) 2015    LEFT LUNG LOBECTOMY    • Lung cancer (HCC) 2017    RIGHT    • On home O2     2.5 L HS,    • Pneumothorax 2015    AFTER LUNG BIOPSY    • Seasonal allergies    • Wears dentures    • Wears glasses        Past Surgical History:   Past Surgical History:   Procedure Laterality Date   • ADRENALECTOMY Left 4/6/2018    Procedure: ADRENALECTOMY LAPAROSCOPIC LEFT;  Surgeon: Carol Ann Guzmán MD;  Location:  [facility];  Service: General   • BREAST BIOPSY Right    • BREAST LUMPECTOMY Left    • BRONCHOSCOPY N/A 11/30/2017    Procedure: BRONCHOSCOPY;  Surgeon: Jeremy Herrera MD;  Location:  [facility];  Service:    • CHEST TUBE INSERTION  2015    LEFT LUNG AFTER LUNG BIOPSY D/T PNEUMOTHORAX    • COLONOSCOPY  2015   • GOLD SEED FIDUCIAL PLACEMENT  05/21/2018    left adrenal gland   • HYSTERECTOMY     • LUNG BIOPSY Bilateral     LEFT IN 2015, RIGHT IN 2017    • LUNG LOBECTOMY Left 08/2015    [facility] PER DR HERRERA    • TEETH EXTRACTION     • THORACOSCOPY VIDEO ASSISTED WITH LOBECTOMY Right 11/30/2017    Procedure: THORACOSCOPY VIDEO ASSISTED WITH RIGHT UPPER LOBE WEDGE RESECTION AND COMPLETION OF RIGHT UPPER LOBECTOMY, MEDIASTINAL LYMPH NODE DISSECTION AND INTERCOSTAL NERVE BLOCKS;  Surgeon: Jeremy Herrera MD;  Location:  [facility];  Service:        Family History:   Family History   Problem Relation Age of Onset   • Hypertension Mother    • Heart attack Mother    • Heart attack Father    • Other Sister         renal mass   • Leukemia Maternal Aunt        Social History:   Social History     Socioeconomic History   • Marital status:      Spouse name: Hardy   • Number of children: 0   Tobacco Use   • Smoking status: Former Smoker     Packs/day: 0.50     Years: 35.00     Pack years: 17.50     Types: Cigarettes     Quit  date: 2015     Years since quittin.6   • Smokeless tobacco: Never Used   • Tobacco comment: smoked up to 2ppd at times   Substance and Sexual Activity   • Alcohol use: No   • Drug use: No   • Sexual activity: Defer       Medications:     Current Outpatient Medications:   •  albuterol (PROVENTIL) (2.5 MG/3ML) 0.083% nebulizer solution, USE 1 VIAL IN NEBULIZER 4 TIMES DAILY AS NEEDED, Disp: , Rfl:   •  amLODIPine (NORVASC) 5 MG tablet, Take 5 mg by mouth Daily., Disp: , Rfl:   •  aspirin 81 MG tablet, Take 81 mg by mouth Daily. Last dose 3-29-18, Disp: , Rfl:   •  Cholecalciferol (VITAMIN D3 PO), Take 5,000 Units by mouth Daily., Disp: , Rfl:   •  docusate sodium (COLACE) 100 MG capsule, Take 100 mg by mouth Daily., Disp: , Rfl:   •  ezetimibe (ZETIA) 10 MG tablet, Take 10 mg by mouth Every Night., Disp: , Rfl:   •  guaiFENesin (MUCINEX) 600 MG 12 hr tablet, Take 1,200 mg by mouth 2 (Two) Times a Day., Disp: , Rfl:   •  loratadine (CLARITIN) 10 MG tablet, Take 10 mg by mouth Daily., Disp: , Rfl:   •  montelukast (SINGULAIR) 10 MG tablet, Take 10 mg by mouth Daily As Needed (allergies)., Disp: , Rfl:   •  olmesartan-hydrochlorothiazide (BENICAR HCT) 40-12.5 MG per tablet, Take 1 tablet by mouth Daily. 1/2 tab in the AM and 1/2 tab in the PM, Disp: , Rfl:   •  omeprazole (priLOSEC) 20 MG capsule, Take 20 mg by mouth Daily., Disp: , Rfl:   •  ondansetron (ZOFRAN) 4 MG tablet, TAKE 1 TABLET BY MOUTH THREE TIMES DAILY AS NEEDED FOR NAUSEA AND VOMITING, Disp: , Rfl:   •  predniSONE (DELTASONE) 10 MG tablet, TAKE 4 TABLETS BY MOUTH ONCE DAILY FOR 2 DAYS THEN TAKE 3 TABLETS ONCE DAILY FOR 2 DAYS THEN TAKE 2 TABLETS ONCE DAILY FOR 2 DAYS THEN TAKE 1 TABLET ONCE DAILY UNTIL GONE, Disp: , Rfl:   •  simvastatin (ZOCOR) 80 MG tablet, Take 80 mg by mouth Every Night., Disp: , Rfl:   •  Triamcinolone Acetonide (NASACORT AQ NA), 2 sprays into each nostril Daily., Disp: , Rfl:   •  Ascorbic Acid (VITAMIN C PO), Take 1,000 mg by  "mouth Daily., Disp: , Rfl:   •  Aspirin Buf,CaCarb-MgCarb-MgO, 81 MG tablet, aspirin 81 mg tablet  Daily, Disp: , Rfl:   •  CRANBERRY PO, Take 1,000 mg by mouth Daily. 2, 500MG CAPSULES DAILY, Disp: , Rfl:   •  Fluticasone-Umeclidin-Vilant (Trelegy Ellipta) 100-62.5-25 MCG/INH inhaler, Inhale 1 puff Daily., Disp: 1 each, Rfl: 5  •  mometasone-formoterol (DULERA 100) 100-5 MCG/ACT inhaler, Inhale 2 puffs Daily As Needed (SHORTNESS OF AIR)., Disp: , Rfl:   •  Multiple Vitamin tablet, Take 1 tablet by mouth Daily., Disp: , Rfl:   •  oxybutynin XL (DITROPAN-XL) 5 MG 24 hr tablet, oxybutynin chloride ER 5 mg tablet,extended release 24 hr  TAKE 1 TABLET BY MOUTH ONCE DAILY, Disp: , Rfl:   •  simethicone (MYLICON) 125 MG chewable tablet, Chew 125 mg Every 6 (Six) Hours As Needed for Flatulence., Disp: , Rfl:   No current facility-administered medications for this visit.    Facility-Administered Medications Ordered in Other Visits:   •  Chlorhexidine Gluconate Cloth 2 % pads 1 application, 1 application, Topical, Q12H PRN, Bonita Loomis PA    Allergies:   Allergies   Allergen Reactions   • Pneumococcal Vaccines Swelling     REDNESS AND SWELLING OF ARM    • Codeine Nausea And Vomiting   • Hydrocodone Nausea And Vomiting   • Sulfa Antibiotics Unknown - Low Severity       Objective     Physical Exam:  Vital Signs:   Vitals:    03/25/22 1122   BP: 130/72   BP Location: Left arm   Patient Position: Sitting   Cuff Size: Adult   Pulse: 80   SpO2: 98%   Weight: 98.9 kg (218 lb)   Height: 165.1 cm (65\")       Physical Exam  Vitals and nursing note reviewed.   Constitutional:       General: She is not in acute distress.     Appearance: She is well-developed. She is not ill-appearing.   HENT:      Head: Normocephalic and atraumatic.      Right Ear: External ear normal.      Left Ear: External ear normal.      Nose: Nose normal. No congestion or rhinorrhea.      Mouth/Throat:      Mouth: Mucous membranes are moist.      Pharynx: " Oropharynx is clear. No oropharyngeal exudate or posterior oropharyngeal erythema.   Eyes:      General: No scleral icterus.        Right eye: No discharge.         Left eye: No discharge.      Extraocular Movements: Extraocular movements intact.      Conjunctiva/sclera: Conjunctivae normal.   Neck:      Trachea: No tracheal deviation.   Cardiovascular:      Rate and Rhythm: Normal rate and regular rhythm.      Heart sounds: No murmur heard.  Pulmonary:      Effort: No respiratory distress.      Breath sounds: No wheezing.   Abdominal:      General: There is no distension.      Palpations: Abdomen is soft.   Musculoskeletal:         General: No tenderness. Normal range of motion.      Cervical back: Normal range of motion and neck supple.      Right lower leg: No edema.      Left lower leg: No edema.   Skin:     General: Skin is warm and dry.      Findings: No rash.   Neurological:      Mental Status: She is alert and oriented to person, place, and time.      Coordination: Coordination normal.   Psychiatric:         Mood and Affect: Mood normal.         Judgment: Judgment normal.       Mallampati Score: III (soft and hard palate and base of uvula visible)    Results Review:   Labs: Reviewed.  March 2022 WBC 12.6, hemoglobin 10.8, platelets 364.  Normal procalcitonin level.  No results found for: CBCDIF, CMP     Micro: As of March 25, 2022   No results found for: RESPCX  No results found for: BLOODCX  Lab Results   Component Value Date    URINECX No growth at 2 days 11/30/2017     No results found for: MRSACX  No results found for: MRSAPCR  No results found for: URCX  No components found for: LOWRESPCF  No results found for: THROATCX  No results found for: CULTURES  No components found for: STREPBCX  No results found for: STREPPNEUAG  No results found for: LEGIONELLA  No results found for: MYCOPLASCX  No results found for: GCCX  No results found for: WOUNDCX  No results found for: BODYFLDCX    ABG:   Lab Results    Component Value Date    PHART 7.422 04/03/2018    MHD4WFJ 41.9 04/03/2018    PO2ART 72.4 (L) 04/03/2018    HGBBG 11.6 (L) 04/03/2018    P8QUPKWD 93.9 (L) 08/25/2015    CFIO2 21 08/25/2015    CARBOXYHGB 1.2 04/03/2018       Echo:     Radiology Scans:   Last CT scan was reviewed in great detail with the patient. Images reviewed personally.     Results for orders placed during the hospital encounter of 01/14/19    CT Chest With & Without Contrast    Narrative  EXAMINATION: CT CHEST W WO CONTRAST-, CT ABDOMEN AND PELVIS W  CONTRAST-01/14/2019:    INDICATION: LUNG CANCER; C34.90-Malignant neoplasm of unspecified part  of unspecified bronchus or lung.    TECHNIQUE: CT chest with and without intravenous contrast, CT abdomen  and pelvis with intravenous contrast administration.    The radiation dose reduction device was turned on for each scan per the  ALARA (As Low as Reasonably Achievable) protocol.    COMPARISON: CT 09/17/2018.    FINDINGS:    CHEST: The thyroid is homogeneous in attenuation. No bulky mediastinal  adenopathy. Central airways are patent. Esophagus in normal course with  mildly patulous appearance. Atherosclerotic nonaneurysmal thoracic aorta  with patent great vessel origins. Coronary calcifications are noted as  well as aortic valvular calcifications and mitral valve calcifications.  No pericardial effusion. Extended lung windows demonstrate subpleural  reticulation within the posterior portion right lower lobe, however, no  focal consolidation or dominant nodule/mass. No significant pleural  effusion. Asymmetric elevation of the left hemidiaphragm again noted and  similar to prior. Degenerative changes of the spine without aggressive  osseous or soft tissue body wall lesions of concern.    CT ABDOMEN AND PELVIS: The liver demonstrates stable subcentimeter  low-attenuation focus within the left hepatic lobe consistent with  hepatic cyst. Mild-to-moderate diffuse low attenuation indicating  hepatic  steatosis similar to prior. Gallbladder unremarkable. No  intrahepatic or extrahepatic biliary dilatation. Pancreas and spleen  grossly unremarkable. Right adrenal without distinct nodule. Left  adrenalectomy unchanged in appearance without soft tissue mass  recurrence. Severely atrophic left kidney parenchyma with minimal  perinephric stranding similar to prior, however, no evidence for  hydronephrosis. Right kidney without hydronephrosis or hydroureter  demonstrating 1-2 mm nonobstructing right renal calculus unchanged from  prior. Atherosclerotic, nonaneurysmal, patent abdominal aorta. Portal  veins and IVC patent. GI tract evaluation without focal thickening or  disproportionate dilatation of bowel. Sigmoid diverticulosis without  evidence for acute diverticulitis. No free fluid or intra-abdominal free  air. The pelvic viscera are grossly unremarkable without bulky pelvic  adenopathy or free fluid. Degenerative changes of the spine without  aggressive osseous or soft tissue body wall lesions of concern.    Impression  Stable appearance of the chest, abdomen and pelvis without  evidence for metastatic disease or progression.    D:  01/14/2019  E:  01/14/2019        This report was finalized on 1/14/2019 1:22 PM by Dr. Noble Harrell.      Results for orders placed during the hospital encounter of 01/27/22    CT Chest With Contrast Diagnostic    Narrative  EXAMINATION: CT ABDOMEN PELVIS W CONTRAST-, CT CHEST W CONTRAST  DIAGNOSTIC-    INDICATION: f/u scans; C79.72-Secondary malignant neoplasm of left  adrenal gland; C34.12-Malignant neoplasm of upper lobe, left bronchus or  lung    TECHNIQUE: Axial IV contrast-enhanced CT of the chest, abdomen and  pelvis with multiplanar reconstruction    The radiation dose reduction device was turned on for each scan per the  ALARA (As Low as Reasonably Achievable) protocol.    COMPARISON: 1/18/2021    FINDINGS: Chest: No pathologic axillary adenopathy or other worrisome  body  wall soft tissue finding in the chest. No pathologic mediastinal or  hilar lymphadenopathy. No pleural or pericardial effusion. Evaluation of  the osseous structures demonstrates no evidence of acute fracture or  aggressive osseous lesion. Atherosclerotic nonaneurysmal thoracic aorta.  Evaluation of the lung fields demonstrates similar appearance of the  lung parenchyma including chronic interstitial changes with some  peripheral reticulation noted posteriorly in the right and minimal  scattered areas of intervening groundglass opacity. There is otherwise  no evidence of acute infectious process or recurrent suspicious  pulmonary nodularity.    Abdomen pelvis: Body wall soft tissues are unremarkable. The osseous  structures demonstrate no evidence of fracture or suspicious osseous  lesion. The liver, spleen and pancreas demonstrate homogeneous  enhancement without evidence of suspicious focal lesion. Postoperative  changes in the region of the left adrenal gland are redemonstrated,  without definite recurrent mass or abnormal soft tissue present. There  is unchanged atrophic appearance of the right kidney. No evidence of  mass, hydronephrosis or nephropathy on the right. The right adrenal  gland is unremarkable. Small and large bowel loops are nondilated. There  is no suspicious focal bowel wall thickening. No free fluid or  pneumoperitoneum. Redemonstrated colonic diverticulosis changes, without  inflammatory signs of diverticulitis. The pelvic viscera are  unremarkable. Nonaneurysmal atherosclerotic abdominal aorta. No bulky  retroperitoneal lymphadenopathy.    Impression  Stable CT appearance of the chest, abdomen and pelvis. There  is no evidence of recurrent or metastatic disease.    No evidence of recurrence or new metastatic involvement in the abdomen  and pelvis. Stable postoperative changes in the region of the left  adrenal gland.      This report was finalized on 1/27/2022 11:08 AM by Eddy  Kurtis.        Results for orders placed during the hospital encounter of 01/06/18    CT Chest Without Contrast    Narrative  FINAL REPORT    TECHNIQUE:  Axial images were obtained from the lung apex to the mid abdomen  by computed tomography.This study was performed with techniques  to keep radiation doses as low as reasonably achievable,  (ALARA). Individualized dose reduction techniques using  automated exposure control or adjustment of mA and/or kV  according to the patient''s size were employed.    CLINICAL HISTORY:  R sided pleuritic chest pain, increased sob, recent lobectomy  for lung cancer    FINDINGS:  There is no axillary adenopathy. There is no hilar or  mediastinal adenopathy. Heart size is normal. There a trace  right pleural effusion. No left pleural or pericardial effusion  is seen. Limited images of the upper abdomen demonstrate severe  left renal atrophy. No suspicious infiltrate or nodule is  identified. Scarring is seen in the anterior right lung,  presumably postoperative.    Impression  Scarring in the anterior right lung, presumably postoperative  with trace right pleural effusion.    Authenticated by TRAVIS Piedra MD on 01/06/2018 03:05:54 PM        PFT IMPRESSION:    2017 PFT showed FEV1 80%, FEV1/FVC ratio 78.  No significant bronchodilator responsiveness.  TLC 82%.  No air-trapping noted.  DL/VA 85.    Assessment / Plan      Assessment/Plan:    1. Centrilobular emphysema (HCC)  Recently diagnosed with COPD, but last PFTs were in 2017.  Did better with Breztri triple therapy, but not on formulary and too expensive for her.  We will try Trelegy for triple therapy, but if not covered we can always go back to Dulera and add Lama to regimen  Discussed risk, benefits and possible side effects of medication.  Advised her to rinse her mouth out after each use and take medications as prescribed.    Check PFTs prior to next clinic visit to ascertain new lung function.  Check 6-minute walk at  next clinic visit.    - Fluticasone-Umeclidin-Vilant (Trelegy Ellipta) 100-62.5-25 MCG/INH inhaler; Inhale 1 puff Daily.  Dispense: 1 each; Refill: 5  - Pulmonary Function Test; Future    2. Seasonal allergic rhinitis due to pollen  Continue with daily antihistamines and Singulair    3. Daytime hypersomnolence  Multiple symptoms suggestive of sleep apnea.  Check sleep study.  - Home Sleep Study; Future    4. GERD without esophagitis  Continue on PPI       Follow Up:   Return in about 3 months (around 6/25/2022).    Christiana Pastor MD  Pulmonary/Critical Care Physician   [facility]      Please note that portions of this note may have been completed with a voice recognition program. Efforts were made to edit the dictations, but occasionally words are mistranscribed.

## 2022-04-19 ENCOUNTER — HOSPITAL ENCOUNTER (OUTPATIENT)
Dept: SLEEP MEDICINE | Facility: HOSPITAL | Age: 82
Discharge: HOME | End: 2022-04-19
Admitting: INTERNAL MEDICINE

## 2022-04-19 DIAGNOSIS — G47.19 DAYTIME HYPERSOMNOLENCE: ICD-10-CM

## 2022-04-19 PROCEDURE — 95806 SLEEP STUDY UNATT&RESP EFFT: CPT | Performed by: INTERNAL MEDICINE

## 2022-04-19 PROCEDURE — 95806 SLEEP STUDY UNATT&RESP EFFT: CPT

## 2022-04-27 DIAGNOSIS — G47.33 OSA (OBSTRUCTIVE SLEEP APNEA): Primary | ICD-10-CM

## 2022-04-30 ENCOUNTER — HOSPITAL ENCOUNTER (EMERGENCY)
Facility: HOSPITAL | Age: 82
Discharge: HOME | End: 2022-04-30
Attending: EMERGENCY MEDICINE | Admitting: EMERGENCY MEDICINE

## 2022-04-30 ENCOUNTER — APPOINTMENT (OUTPATIENT)
Dept: GENERAL RADIOLOGY | Facility: HOSPITAL | Age: 82
End: 2022-04-30

## 2022-04-30 VITALS
HEIGHT: 65 IN | RESPIRATION RATE: 18 BRPM | TEMPERATURE: 97.9 F | BODY MASS INDEX: 34.99 KG/M2 | HEART RATE: 81 BPM | DIASTOLIC BLOOD PRESSURE: 82 MMHG | SYSTOLIC BLOOD PRESSURE: 144 MMHG | WEIGHT: 210 LBS | OXYGEN SATURATION: 96 %

## 2022-04-30 DIAGNOSIS — R55 VASOVAGAL SYNCOPE: Primary | ICD-10-CM

## 2022-04-30 DIAGNOSIS — S51.012A ELBOW LACERATION, LEFT, INITIAL ENCOUNTER: ICD-10-CM

## 2022-04-30 LAB
ALBUMIN SERPL-MCNC: 4 G/DL (ref 3.5–5.2)
ALBUMIN/GLOB SERPL: 1.7 G/DL
ALP SERPL-CCNC: 49 U/L (ref 39–117)
ALT SERPL W P-5'-P-CCNC: 30 U/L (ref 1–33)
ANION GAP SERPL CALCULATED.3IONS-SCNC: 10.9 MMOL/L (ref 5–15)
AST SERPL-CCNC: 23 U/L (ref 1–32)
BASOPHILS # BLD AUTO: 0.04 10*3/MM3 (ref 0–0.2)
BASOPHILS NFR BLD AUTO: 0.3 % (ref 0–1.5)
BILIRUB SERPL-MCNC: 0.2 MG/DL (ref 0–1.2)
BILIRUB UR QL STRIP: NEGATIVE
BUN SERPL-MCNC: 52 MG/DL (ref 8–23)
BUN/CREAT SERPL: 28.7 (ref 7–25)
CALCIUM SPEC-SCNC: 8.9 MG/DL (ref 8.6–10.5)
CHLORIDE SERPL-SCNC: 105 MMOL/L (ref 98–107)
CLARITY UR: CLEAR
CO2 SERPL-SCNC: 22.1 MMOL/L (ref 22–29)
COLOR UR: YELLOW
CREAT SERPL-MCNC: 1.81 MG/DL (ref 0.57–1)
DEPRECATED RDW RBC AUTO: 50.4 FL (ref 37–54)
EGFRCR SERPLBLD CKD-EPI 2021: 28 ML/MIN/1.73
EOSINOPHIL # BLD AUTO: 0.01 10*3/MM3 (ref 0–0.4)
EOSINOPHIL NFR BLD AUTO: 0.1 % (ref 0.3–6.2)
ERYTHROCYTE [DISTWIDTH] IN BLOOD BY AUTOMATED COUNT: 15 % (ref 12.3–15.4)
GLOBULIN UR ELPH-MCNC: 2.3 GM/DL
GLUCOSE BLDC GLUCOMTR-MCNC: 151 MG/DL (ref 70–130)
GLUCOSE SERPL-MCNC: 111 MG/DL (ref 65–99)
GLUCOSE UR STRIP-MCNC: NEGATIVE MG/DL
HCT VFR BLD AUTO: 32.4 % (ref 34–46.6)
HGB BLD-MCNC: 10.6 G/DL (ref 12–15.9)
HGB UR QL STRIP.AUTO: NEGATIVE
HOLD SPECIMEN: NORMAL
HOLD SPECIMEN: NORMAL
IMM GRANULOCYTES # BLD AUTO: 0.39 10*3/MM3 (ref 0–0.05)
IMM GRANULOCYTES NFR BLD AUTO: 2.6 % (ref 0–0.5)
KETONES UR QL STRIP: NEGATIVE
LEUKOCYTE ESTERASE UR QL STRIP.AUTO: NEGATIVE
LYMPHOCYTES # BLD AUTO: 0.89 10*3/MM3 (ref 0.7–3.1)
LYMPHOCYTES NFR BLD AUTO: 5.9 % (ref 19.6–45.3)
MAGNESIUM SERPL-MCNC: 2.6 MG/DL (ref 1.6–2.4)
MCH RBC QN AUTO: 30.3 PG (ref 26.6–33)
MCHC RBC AUTO-ENTMCNC: 32.7 G/DL (ref 31.5–35.7)
MCV RBC AUTO: 92.6 FL (ref 79–97)
MONOCYTES # BLD AUTO: 1.33 10*3/MM3 (ref 0.1–0.9)
MONOCYTES NFR BLD AUTO: 8.9 % (ref 5–12)
NEUTROPHILS NFR BLD AUTO: 12.34 10*3/MM3 (ref 1.7–7)
NEUTROPHILS NFR BLD AUTO: 82.2 % (ref 42.7–76)
NITRITE UR QL STRIP: NEGATIVE
NRBC BLD AUTO-RTO: 0 /100 WBC (ref 0–0.2)
PH UR STRIP.AUTO: <=5 [PH] (ref 5–8)
PLATELET # BLD AUTO: 279 10*3/MM3 (ref 140–450)
PMV BLD AUTO: 9.3 FL (ref 6–12)
POTASSIUM SERPL-SCNC: 4.5 MMOL/L (ref 3.5–5.2)
PROT SERPL-MCNC: 6.3 G/DL (ref 6–8.5)
PROT UR QL STRIP: NEGATIVE
RBC # BLD AUTO: 3.5 10*6/MM3 (ref 3.77–5.28)
SODIUM SERPL-SCNC: 138 MMOL/L (ref 136–145)
SP GR UR STRIP: 1.01 (ref 1–1.03)
TROPONIN T SERPL-MCNC: <0.01 NG/ML (ref 0–0.03)
UROBILINOGEN UR QL STRIP: NORMAL
WBC NRBC COR # BLD: 15 10*3/MM3 (ref 3.4–10.8)
WHOLE BLOOD HOLD SPECIMEN: NORMAL
WHOLE BLOOD HOLD SPECIMEN: NORMAL

## 2022-04-30 PROCEDURE — 84484 ASSAY OF TROPONIN QUANT: CPT | Performed by: EMERGENCY MEDICINE

## 2022-04-30 PROCEDURE — 0 LIDOCAINE 1 % SOLUTION

## 2022-04-30 PROCEDURE — 71045 X-RAY EXAM CHEST 1 VIEW: CPT

## 2022-04-30 PROCEDURE — 73080 X-RAY EXAM OF ELBOW: CPT

## 2022-04-30 PROCEDURE — 80053 COMPREHEN METABOLIC PANEL: CPT | Performed by: EMERGENCY MEDICINE

## 2022-04-30 PROCEDURE — 93005 ELECTROCARDIOGRAM TRACING: CPT

## 2022-04-30 PROCEDURE — 83735 ASSAY OF MAGNESIUM: CPT | Performed by: EMERGENCY MEDICINE

## 2022-04-30 PROCEDURE — 93005 ELECTROCARDIOGRAM TRACING: CPT | Performed by: EMERGENCY MEDICINE

## 2022-04-30 PROCEDURE — 36415 COLL VENOUS BLD VENIPUNCTURE: CPT

## 2022-04-30 PROCEDURE — 82962 GLUCOSE BLOOD TEST: CPT

## 2022-04-30 PROCEDURE — 73110 X-RAY EXAM OF WRIST: CPT

## 2022-04-30 PROCEDURE — 85025 COMPLETE CBC W/AUTO DIFF WBC: CPT | Performed by: EMERGENCY MEDICINE

## 2022-04-30 PROCEDURE — 99284 EMERGENCY DEPT VISIT MOD MDM: CPT

## 2022-04-30 PROCEDURE — 81003 URINALYSIS AUTO W/O SCOPE: CPT | Performed by: EMERGENCY MEDICINE

## 2022-04-30 RX ORDER — SODIUM CHLORIDE 0.9 % (FLUSH) 0.9 %
10 SYRINGE (ML) INJECTION AS NEEDED
Status: DISCONTINUED | OUTPATIENT
Start: 2022-04-30 | End: 2022-04-30 | Stop reason: HOSPADM

## 2022-04-30 RX ORDER — LIDOCAINE HYDROCHLORIDE 10 MG/ML
10 INJECTION, SOLUTION INFILTRATION; PERINEURAL ONCE
Status: COMPLETED | OUTPATIENT
Start: 2022-04-30 | End: 2022-04-30

## 2022-04-30 RX ADMIN — LIDOCAINE HYDROCHLORIDE 10 ML: 10 INJECTION, SOLUTION INFILTRATION; PERINEURAL at 19:28

## 2022-04-30 RX ADMIN — SODIUM CHLORIDE 1000 ML: 9 INJECTION, SOLUTION INTRAVENOUS at 19:05

## 2022-05-01 NOTE — DISCHARGE INSTRUCTIONS
Keep the pressure dressing on while you are a wake to help with bleeding. Remove before sleeping, but if laceration is still oozing, can place a regular bandage to help. Keep stitches in place for 10 days. Can return to the ER to have removed or have them removed by your PCP. No need for antibiotics or a Tdap (you are up to date). Increase your fluid intake to help kidney function.

## 2022-05-10 ENCOUNTER — OFFICE VISIT (OUTPATIENT)
Dept: CARDIAC SURGERY | Facility: CLINIC | Age: 82
End: 2022-05-10

## 2022-05-10 VITALS
HEART RATE: 97 BPM | OXYGEN SATURATION: 94 % | TEMPERATURE: 97.1 F | DIASTOLIC BLOOD PRESSURE: 52 MMHG | SYSTOLIC BLOOD PRESSURE: 116 MMHG | WEIGHT: 218 LBS | BODY MASS INDEX: 36.32 KG/M2 | HEIGHT: 65 IN

## 2022-05-10 DIAGNOSIS — R01.1 MURMUR: ICD-10-CM

## 2022-05-10 DIAGNOSIS — R01.1 MURMUR: Primary | ICD-10-CM

## 2022-05-10 DIAGNOSIS — R55 SYNCOPE, UNSPECIFIED SYNCOPE TYPE: ICD-10-CM

## 2022-05-10 DIAGNOSIS — C34.90 MALIGNANT NEOPLASM OF LUNG, UNSPECIFIED LATERALITY, UNSPECIFIED PART OF LUNG: Primary | ICD-10-CM

## 2022-05-10 PROCEDURE — 99214 OFFICE O/P EST MOD 30 MIN: CPT | Performed by: NURSE PRACTITIONER

## 2022-05-10 RX ORDER — BUDESONIDE, GLYCOPYRROLATE, AND FORMOTEROL FUMARATE 160; 9; 4.8 UG/1; UG/1; UG/1
2 AEROSOL, METERED RESPIRATORY (INHALATION) AS NEEDED
COMMUNITY
End: 2023-02-06 | Stop reason: SDUPTHER

## 2022-05-10 RX ORDER — AMOXICILLIN AND CLAVULANATE POTASSIUM 875; 125 MG/1; MG/1
1 TABLET, FILM COATED ORAL 2 TIMES DAILY
COMMUNITY
End: 2022-06-10

## 2022-05-10 NOTE — PROGRESS NOTES
[facility] Cardiothoracic Surgery Office Follow Up Note     Date of Encounter: 05/10/2022     YOB: 1940  Name: America Nix    PCP: Hai Solomon MD    Chief Complaint:    Chief Complaint   Patient presents with   • Lung Cancer     1 year follow up for lung cancer.       History of Present Illness:      America Nix is a 82 y.o. female with a history of hypertension, hypercholesterolemia, stage Ib left lung adenocarcinoma s/p left upper lobectomy on 8/26/2015 with Dr. Herrera and stage Ib right lung adenocarcinoma s/p right upper lobectomy on 11/30/2017 with isolated left adrenal adenocarcinoma metastasis s/p left adrenalectomy on 2/1/2018 with positive margin s/p cyberknife.    Patient presents today for annual follow-up of her lung cancer.  Last seen in the office on 4/20/2021.  Since last office visit, patient suffered a syncopal episode with injury to her left elbow after hitting a metal wagon with subsequent lacerations requiring sutures at [facility] ER on 4/30/2022.  Imaging negative for fracture.  Patient reports that she has no recollection of precipitating factors that cause her recent passing out episode, but ER note mention hot air hitting her face with entrance into local greenhouse and she felt weak with passing out.  She denies any further presyncopal/syncopal episodes.  She denies any chest pain, palpitations , activity intolerance, bradycardia or hypotensive events.  She does have a new bilateral lower extremity edema especially in her ankles.  She reports baseline dyspnea on exertion but denies any orthopnea.  She has been dealing with  recurrent pneumonia/COPD exacerbations requiring multiple steroid tapers as well as multiple inhalers/neb treatments over the last year.  She was seen in initial pulmonary consultation with Dr. Pastor in March who is adjusting her medication regimen.  Patient reports severe sleep apnea and plans for initiation of  CPAP.  She has pulmonary function test scheduled in June.  She was recently diagnosed with upper respiratory infection and has been initiated on Augmentin therapy with Dr. Solomon.  She also was noted to have new seasonal allergies in which she has been referred to an allergist with ongoing hoarseness.  She is continuing to follow with Dr. Washburn's office with repeat CT chest/abd/pelvis and denies any unusual cough, hemoptysis or unexplained weight loss.    Review of Systems:  Review of Systems   Constitutional: Positive for weight gain. Negative for chills, decreased appetite, diaphoresis, fever, malaise/fatigue, night sweats and weight loss.   HENT: Positive for hoarse voice and sore throat. Negative for congestion and stridor.    Eyes: Negative.    Cardiovascular: Positive for dyspnea on exertion, leg swelling (ankles) and syncope. Negative for chest pain, claudication, irregular heartbeat, near-syncope, orthopnea, palpitations and paroxysmal nocturnal dyspnea.   Respiratory: Positive for cough and sleep disturbances due to breathing. Negative for hemoptysis, shortness of breath, snoring, sputum production and wheezing.    Hematologic/Lymphatic: Negative for adenopathy and bleeding problem. Bruises/bleeds easily.   Skin: Negative.  Negative for color change, dry skin, itching, poor wound healing and rash.   Musculoskeletal: Positive for falls and joint pain. Negative for arthritis, back pain and muscle weakness.   Gastrointestinal: Negative.  Negative for abdominal pain, anorexia, constipation, diarrhea, hematochezia, melena, nausea and vomiting.   Neurological: Positive for loss of balance. Negative for difficulty with concentration, disturbances in coordination, dizziness, numbness, seizures, vertigo and weakness.   Psychiatric/Behavioral: Negative.  Negative for altered mental status, depression, memory loss and substance abuse. The patient does not have insomnia and is not nervous/anxious.     Allergic/Immunologic: Negative.  Negative for persistent infections.       Allergies:  Allergies   Allergen Reactions   • Pneumococcal Vaccines Swelling     REDNESS AND SWELLING OF ARM    • Codeine Nausea And Vomiting   • Hydrocodone Nausea And Vomiting   • Sulfa Antibiotics Unknown - Low Severity       Medications:      Current Outpatient Medications:   •  albuterol (PROVENTIL) (2.5 MG/3ML) 0.083% nebulizer solution, USE 1 VIAL IN NEBULIZER 4 TIMES DAILY AS NEEDED, Disp: , Rfl:   •  amLODIPine (NORVASC) 5 MG tablet, Take 5 mg by mouth Daily., Disp: , Rfl:   •  amoxicillin-clavulanate (AUGMENTIN) 875-125 MG per tablet, Take 1 tablet by mouth 2 (Two) Times a Day., Disp: , Rfl:   •  aspirin 81 MG tablet, Take 81 mg by mouth Daily. Last dose 3-29-18, Disp: , Rfl:   •  Budeson-Glycopyrrol-Formoterol (Breztri Aerosphere) 160-9-4.8 MCG/ACT aerosol inhaler, Inhale 2 puffs 2 (Two) Times a Day., Disp: , Rfl:   •  Cholecalciferol (VITAMIN D3 PO), Take 5,000 Units by mouth Daily., Disp: , Rfl:   •  docusate sodium (COLACE) 100 MG capsule, Take 100 mg by mouth Daily., Disp: , Rfl:   •  ezetimibe (ZETIA) 10 MG tablet, Take 10 mg by mouth Every Night., Disp: , Rfl:   •  guaiFENesin (MUCINEX) 600 MG 12 hr tablet, Take 1,200 mg by mouth 2 (Two) Times a Day., Disp: , Rfl:   •  loratadine (CLARITIN) 10 MG tablet, Take 10 mg by mouth Daily., Disp: , Rfl:   •  montelukast (SINGULAIR) 10 MG tablet, Take 10 mg by mouth Daily As Needed (allergies)., Disp: , Rfl:   •  olmesartan-hydrochlorothiazide (BENICAR HCT) 40-12.5 MG per tablet, Take 1 tablet by mouth Daily. 1/2 tab in the AM and 1/2 tab in the PM, Disp: , Rfl:   •  omeprazole (priLOSEC) 20 MG capsule, Take 20 mg by mouth Daily., Disp: , Rfl:   •  oxybutynin XL (DITROPAN-XL) 5 MG 24 hr tablet, oxybutynin chloride ER 5 mg tablet,extended release 24 hr  TAKE 1 TABLET BY MOUTH ONCE DAILY, Disp: , Rfl:   •  simvastatin (ZOCOR) 80 MG tablet, Take 80 mg by mouth Every Night., Disp: ,  Rfl:   •  Triamcinolone Acetonide (NASACORT AQ NA), 2 sprays into each nostril Daily., Disp: , Rfl:   •  Fluticasone-Umeclidin-Vilant (Trelegy Ellipta) 100-62.5-25 MCG/INH inhaler, Inhale 1 puff Daily. (Patient not taking: Reported on 5/10/2022), Disp: 1 each, Rfl: 5  •  mometasone-formoterol (DULERA 100) 100-5 MCG/ACT inhaler, Inhale 2 puffs Daily As Needed (SHORTNESS OF AIR). (Patient not taking: Reported on 5/10/2022), Disp: , Rfl:   •  ondansetron (ZOFRAN) 4 MG tablet, TAKE 1 TABLET BY MOUTH THREE TIMES DAILY AS NEEDED FOR NAUSEA AND VOMITING (Patient not taking: Reported on 5/10/2022), Disp: , Rfl:   No current facility-administered medications for this visit.    Social History     Socioeconomic History   • Marital status:      Spouse name: Hardy   • Number of children: 0   Tobacco Use   • Smoking status: Former Smoker     Packs/day: 0.50     Years: 35.00     Pack years: 17.50     Types: Cigarettes     Quit date: 2015     Years since quittin.7   • Smokeless tobacco: Never Used   • Tobacco comment: smoked up to 2ppd at times   Vaping Use   • Vaping Use: Never used   Substance and Sexual Activity   • Alcohol use: No   • Drug use: No   • Sexual activity: Defer       Family History   Problem Relation Age of Onset   • Hypertension Mother    • Heart attack Mother    • Heart attack Father    • Other Sister         renal mass   • Leukemia Maternal Aunt        Past Medical History:   Diagnosis Date   • Arthritis    • GERD (gastroesophageal reflux disease)    • Hypercholesteremia    • Hypertension    • Lung cancer (HCC) 2015    LEFT LUNG LOBECTOMY    • Lung cancer (HCC) 2017    RIGHT    • On home O2     2.5 L HS,    • Pneumonia    • Pneumothorax     AFTER LUNG BIOPSY    • Seasonal allergies    • Sleep apnea    • Wears dentures    • Wears glasses        Past Surgical History:   Procedure Laterality Date   • ADRENALECTOMY Left 2018    Procedure: ADRENALECTOMY LAPAROSCOPIC LEFT;  Surgeon: Carol Ann SHAIKH  "MD Ramirez;  Location:  [facility];  Service: General   • BREAST BIOPSY Right    • BREAST LUMPECTOMY Left    • BRONCHOSCOPY N/A 2017    Procedure: BRONCHOSCOPY;  Surgeon: Jeremy Herrera MD;  Location:  [facility];  Service:    • CHEST TUBE INSERTION      LEFT LUNG AFTER LUNG BIOPSY D/T PNEUMOTHORAX    • COLONOSCOPY     • GOLD SEED FIDUCIAL PLACEMENT  2018    left adrenal gland   • HYSTERECTOMY     • LUNG BIOPSY Bilateral     LEFT IN , RIGHT IN     • LUNG LOBECTOMY Left 2015    [facility] PER DR HERRERA    • TEETH EXTRACTION     • THORACOSCOPY VIDEO ASSISTED WITH LOBECTOMY Right 2017    Procedure: THORACOSCOPY VIDEO ASSISTED WITH RIGHT UPPER LOBE WEDGE RESECTION AND COMPLETION OF RIGHT UPPER LOBECTOMY, MEDIASTINAL LYMPH NODE DISSECTION AND INTERCOSTAL NERVE BLOCKS;  Surgeon: Jeremy Herrera MD;  Location:  [facility];  Service:        I have reviewed the following portions of the patient's history: allergies, current medications, past family history, past medical history, past social history, past surgical history and problem list and confirm it's accurate.    Physical Exam:  Vital Signs:    Vitals:    05/10/22 0846   BP: 116/52   BP Location: Right arm   Patient Position: Sitting   Pulse: 97   Temp: 97.1 °F (36.2 °C)   SpO2: 94%   Weight: 98.9 kg (218 lb)   Height: 165.1 cm (65\")     Body mass index is 36.28 kg/m².     Physical Exam  Vitals and nursing note reviewed.   Constitutional:       Appearance: Normal appearance. She is well-developed and well-groomed.   HENT:      Head: Normocephalic and atraumatic.   Cardiovascular:      Rate and Rhythm: Normal rate and regular rhythm.      Heart sounds: S1 normal and S2 normal. Murmur heard.     No friction rub.   Pulmonary:      Comments: Unlabored, Coarse to auscultation bilaterally  Musculoskeletal:      Cervical back: Neck supple.      Right lower le+ Edema present.      Left lower le+ Edema present.   Skin:     General: Skin is warm and " dry.      Comments: Left elbow laceration sutures intact with very mild surrounding erythema.    Sutures removed with placement of Steri-Strips   Neurological:      Mental Status: She is alert and oriented to person, place, and time.   Psychiatric:         Attention and Perception: Attention normal.         Mood and Affect: Mood normal.         Speech: Speech normal.         Behavior: Behavior is cooperative.         Labs/Imaging:      XR Elbow 3+ View Left    Result Date: 4/30/2022  No acute fracture or malalignment.  Soft tissue injury over olecranon. Authenticated by Jose M Cavazos MD on 04/30/2022 06:42:43 PM    XR Wrist 3+ View Left    Result Date: 5/1/2022  No acute osseous abnormality of the left wrist.  Soft tissue edema.  Possible foreign body in the volar soft tissues at the level of the distal radius.      This report was signed and finalized on 5/1/2022 10:15 AM by Ryanne Palencia MD.    XR Chest 1 View    Result Date: 5/1/2022  Low lung volumes with chronic elevation of the left hemidiaphragm.  This report was signed and finalized on 5/1/2022 10:15 AM by Ryanne Palencia MD.     1/27/22 CT chest/abd/pelvis:    FINDINGS: Chest: No pathologic axillary adenopathy or other worrisome  body wall soft tissue finding in the chest. No pathologic mediastinal or  hilar lymphadenopathy. No pleural or pericardial effusion. Evaluation of  the osseous structures demonstrates no evidence of acute fracture or  aggressive osseous lesion. Atherosclerotic nonaneurysmal thoracic aorta.  Evaluation of the lung fields demonstrates similar appearance of the  lung parenchyma including chronic interstitial changes with some  peripheral reticulation noted posteriorly in the right and minimal  scattered areas of intervening groundglass opacity. There is otherwise  no evidence of acute infectious process or recurrent suspicious  pulmonary nodularity.     Abdomen pelvis: Body wall soft tissues are unremarkable. The osseous  structures  demonstrate no evidence of fracture or suspicious osseous  lesion. The liver, spleen and pancreas demonstrate homogeneous  enhancement without evidence of suspicious focal lesion. Postoperative  changes in the region of the left adrenal gland are redemonstrated,  without definite recurrent mass or abnormal soft tissue present. There  is unchanged atrophic appearance of the right kidney. No evidence of  mass, hydronephrosis or nephropathy on the right. The right adrenal  gland is unremarkable. Small and large bowel loops are nondilated. There  is no suspicious focal bowel wall thickening. No free fluid or  pneumoperitoneum. Redemonstrated colonic diverticulosis changes, without  inflammatory signs of diverticulitis. The pelvic viscera are  unremarkable. Nonaneurysmal atherosclerotic abdominal aorta. No bulky  retroperitoneal lymphadenopathy.     IMPRESSION:  Stable CT appearance of the chest, abdomen and pelvis. There  is no evidence of recurrent or metastatic disease.     No evidence of recurrence or new metastatic involvement in the abdomen  and pelvis. Stable postoperative changes in the region of the left  adrenal gland.    Personally reviewed CT chest    Time Spent: I spent 43 minutes caring for Aemrica on this date of service. This time includes time spent by me in the following activities: preparing for the visit, reviewing tests, obtaining and/or reviewing a separately obtained history, performing a medically appropriate examination and/or evaluation, counseling and educating the patient/family/caregiver, ordering medications, tests, or procedures, documenting information in the medical record, independently interpreting results and communicating that information with the patient/family/caregiver and care coordination.     Assessment / Plan:  Diagnoses and all orders for this visit:    1. Malignant neoplasm of lung, unspecified laterality, unspecified part of lung (HCC) (Primary)    2. Murmur    3. Syncope,  unspecified syncope type       America Nix is a 82 y.o. female with a history of hypertension, hypercholesterolemia, stage Ib left lung adenocarcinoma s/p left upper lobectomy on 8/26/2015 with Dr. Herrera and stage Ib right lung adenocarcinoma s/p right upper lobectomy on 11/30/2017 with isolated left adrenal adenocarcinoma metastasis s/p left adrenalectomy on 2/1/2018 with positive margin s/p cyberknife.    Myself and Dr. Herrera examined patient and reviewed Jan 2022 CT chest imaging with no evidence of recurrence or metastatic process.  Chest x-ray on 5/1/22 with chronic elevation of left hemidiaphragm and noted vascular congestion.  Patient with recent syncopal episode and subsequent left elbow laceration.  In addition on physical exam in office today, patient has a new murmur best heard at the aortic space, new lower extremity edema with no recent cardiac work-up.  We will proceed with TTE to evaluate for valvular/LV dysfunction.  She has been seen recently in new referral to Dr. Pastor, pulmonary with new diagnosis of sleep apnea with plans for initiation of CPAP as well as PFTs scheduled for June.  Left elbow is healing well and she is currently on Augmentin therapy for recent upper respiratory infection.  Sutures removed in office today with placement of Steri-Strips.  She is continue to follow closely with Dr. Washburn's office.  Julius to follow-up with allergist with new seasonal allergies/hoarseness.  Hoarseness may also exacerbated with her recent increased use of inhalers/neb treatments which she is now using as needed.  We will plan to follow-up following her TTE with telephone visit.    JENN Clayton  [facility] Cardiothoracic Surgery

## 2022-05-18 ENCOUNTER — HOSPITAL ENCOUNTER (OUTPATIENT)
Dept: CARDIOLOGY | Facility: HOSPITAL | Age: 82
Discharge: HOME | End: 2022-05-18
Admitting: NURSE PRACTITIONER

## 2022-05-18 DIAGNOSIS — R01.1 MURMUR: ICD-10-CM

## 2022-05-18 PROCEDURE — 93306 TTE W/DOPPLER COMPLETE: CPT | Performed by: INTERNAL MEDICINE

## 2022-05-18 PROCEDURE — 93306 TTE W/DOPPLER COMPLETE: CPT

## 2022-05-19 ENCOUNTER — TELEPHONE (OUTPATIENT)
Dept: CARDIAC SURGERY | Facility: CLINIC | Age: 82
End: 2022-05-19

## 2022-05-19 LAB
BH CV ECHO MEAS - AO MAX PG: 14.7 MMHG
BH CV ECHO MEAS - AO MEAN PG: 6 MMHG
BH CV ECHO MEAS - AO ROOT DIAM: 2.3 CM
BH CV ECHO MEAS - AO V2 MAX: 192 CM/SEC
BH CV ECHO MEAS - AO V2 VTI: 40.6 CM
BH CV ECHO MEAS - AVA(I,D): 2.03 CM2
BH CV ECHO MEAS - EDV(CUBED): 83.5 ML
BH CV ECHO MEAS - EDV(MOD-SP4): 95 ML
BH CV ECHO MEAS - EF(MOD-SP4): 62.1 %
BH CV ECHO MEAS - ESV(CUBED): 30.4 ML
BH CV ECHO MEAS - ESV(MOD-SP4): 36 ML
BH CV ECHO MEAS - FS: 28.6 %
BH CV ECHO MEAS - IVS/LVPW: 0.88 CM
BH CV ECHO MEAS - IVSD: 0.96 CM
BH CV ECHO MEAS - LA DIMENSION: 3.5 CM
BH CV ECHO MEAS - LV DIASTOLIC VOL/BSA (35-75): 46.3 CM2
BH CV ECHO MEAS - LV MASS(C)D: 151 GRAMS
BH CV ECHO MEAS - LV MAX PG: 8.4 MMHG
BH CV ECHO MEAS - LV MEAN PG: 3 MMHG
BH CV ECHO MEAS - LV SYSTOLIC VOL/BSA (12-30): 17.5 CM2
BH CV ECHO MEAS - LV V1 MAX: 145 CM/SEC
BH CV ECHO MEAS - LV V1 VTI: 29.1 CM
BH CV ECHO MEAS - LVIDD: 4.4 CM
BH CV ECHO MEAS - LVIDS: 3.1 CM
BH CV ECHO MEAS - LVOT AREA: 2.8 CM2
BH CV ECHO MEAS - LVOT DIAM: 1.9 CM
BH CV ECHO MEAS - LVPWD: 1.09 CM
BH CV ECHO MEAS - MV A MAX VEL: 162 CM/SEC
BH CV ECHO MEAS - MV DEC SLOPE: 786 CM/SEC2
BH CV ECHO MEAS - MV DEC TIME: 0.24 MSEC
BH CV ECHO MEAS - MV E MAX VEL: 120 CM/SEC
BH CV ECHO MEAS - MV E/A: 0.74
BH CV ECHO MEAS - MV P1/2T: 54.4 MSEC
BH CV ECHO MEAS - MVA(P1/2T): 4 CM2
BH CV ECHO MEAS - PA ACC SLOPE: 846 CM/SEC2
BH CV ECHO MEAS - PA ACC TIME: 0.11 SEC
BH CV ECHO MEAS - PA PR(ACCEL): 29.1 MMHG
BH CV ECHO MEAS - RAP SYSTOLE: 3 MMHG
BH CV ECHO MEAS - RVSP: 37.3 MMHG
BH CV ECHO MEAS - SI(MOD-SP4): 28.8 ML/M2
BH CV ECHO MEAS - SV(LVOT): 82.5 ML
BH CV ECHO MEAS - SV(MOD-SP4): 59 ML
BH CV ECHO MEAS - TR MAX PG: 34.3 MMHG
BH CV ECHO MEAS - TR MAX VEL: 293 CM/SEC
BH CV XLRA - RV BASE: 3.6 CM
BH CV XLRA - RV LENGTH: 5.2 CM
BH CV XLRA - RV MID: 3.2 CM
LV EF 2D ECHO EST: 60 %
MAXIMAL PREDICTED HEART RATE: 140 BPM
STRESS TARGET HR: 119 BPM

## 2022-05-19 NOTE — TELEPHONE ENCOUNTER
Reviewed TTE results/imaging with Dr. Herrera.  Unfortunately, AV is poorly visualized.  NL EF.  Pt with ongoing SOA/fatigue/activity intolerance that is continuing to worsen.  Dr. Herrera and myself have recommended for pt to proceed to [facility] ED for evaluation.  I will have our office reach out to pt with recommendations.    JENN Clayton   [facility] CT surgery

## 2022-05-24 ENCOUNTER — TELEPHONE (OUTPATIENT)
Dept: CARDIAC SURGERY | Facility: CLINIC | Age: 82
End: 2022-05-24

## 2022-05-24 DIAGNOSIS — R01.1 MURMUR: Primary | ICD-10-CM

## 2022-05-24 DIAGNOSIS — R06.02 SHORTNESS OF BREATH: ICD-10-CM

## 2022-05-24 NOTE — TELEPHONE ENCOUNTER
Pt did not go to ED for workup due to some improvement in her symptoms as well as concern for the care of her  with her being gone if hospitalization indicated.  She continues to have SOA and activity intolerance with new murmur.  No further syncopal episodes.  Unfortunately, recent outpt TTE performed at [facility] with poor visualization of AV.  Will refer pt to Dr. Yanez in [facility] for further evaluation.  Have discussed with pt's family.      JENN Clayton  [facility] CT surgery

## 2022-05-27 ENCOUNTER — APPOINTMENT (OUTPATIENT)
Dept: CARDIOLOGY | Facility: HOSPITAL | Age: 82
End: 2022-05-27

## 2022-06-08 PROBLEM — R01.1 HEART MURMUR: Status: ACTIVE | Noted: 2022-06-08

## 2022-06-08 NOTE — PROGRESS NOTES
[facility] at [facility]  Cardiology Consultation Note     America Nix  1940  Requesting Provider: Christiana Pastor, *  PCP: Hai Solomon MD    ID:  America Nix is a 82 y.o. female who resides in [place], [place].  The patient's daughter works as an x-ray tech for Dr. Jeremy Herrera.    REASON FOR CONSULTATION:    • Murmur  • Short of breath         Dear Dr. Pastor:    You for referring America Nix to my office today.  She is a sweet 82-year-old female with a past medical history of lung cancer status post bilateral upper lobectomies.  1 year ago, the patient was physically active and able to do things without much shortness of breath.  However, over the last several months the patient has been experiencing progressive shortness of breath to the point that she cannot walk short distances without becoming exhausted and needing to stop.  This is particularly bad in warm weather.  She denies any exertional chest pressure to suggest classical anginal symptoms.  However, the patient states that she has a strong family history of coronary disease.    On recent exam, a murmur was auscultated.  A subsequent echo was performed and showed normal LV systolic function and trivial aortic stenosis.  There is mild pulmonary hypertension present.    The patient is experiencing bilateral lower extremity swelling which is worsened.  She has not taken a diuretic for this.  She is taking amlodipine for hypertension.      Past Medical History, Past Surgical History, Family history, Social History, and Medications were all reviewed with the patient today and updated as necessary.       Current Outpatient Medications:   •  albuterol (PROVENTIL) (2.5 MG/3ML) 0.083% nebulizer solution, USE 1 VIAL IN NEBULIZER 4 TIMES DAILY AS NEEDED, Disp: , Rfl:   •  Budeson-Glycopyrrol-Formoterol (Breztri Aerosphere) 160-9-4.8 MCG/ACT aerosol inhaler, Inhale 2 puffs As Needed., Disp: , Rfl:   •   Cholecalciferol (Vitamin D3) 50 MCG (2000 UT) capsule, Take 2,000 Units by mouth Daily., Disp: , Rfl:   •  docusate sodium (COLACE) 50 MG capsule, Take 50 mg by mouth Daily., Disp: , Rfl:   •  Fluticasone-Umeclidin-Vilant (Trelegy Ellipta) 100-62.5-25 MCG/INH inhaler, Inhale 1 puff Daily. (Patient taking differently: Inhale 1 puff As Needed.), Disp: 1 each, Rfl: 5  •  guaiFENesin (MUCINEX) 600 MG 12 hr tablet, Take 1,200 mg by mouth 2 (Two) Times a Day., Disp: , Rfl:   •  loratadine (CLARITIN) 10 MG tablet, Take 10 mg by mouth Daily., Disp: , Rfl:   •  mometasone-formoterol (DULERA 100) 100-5 MCG/ACT inhaler, Inhale 2 puffs Daily As Needed (SHORTNESS OF AIR)., Disp: , Rfl:   •  montelukast (SINGULAIR) 10 MG tablet, Take 10 mg by mouth Daily As Needed (allergies)., Disp: , Rfl:   •  olmesartan-hydrochlorothiazide (BENICAR HCT) 40-12.5 MG per tablet, Take 1 tablet by mouth Daily. 1/2 tab in the AM and 1/2 tab in the PM, Disp: , Rfl:   •  omeprazole (priLOSEC) 20 MG capsule, Take 20 mg by mouth As Needed., Disp: , Rfl:   •  ondansetron (ZOFRAN) 4 MG tablet, , Disp: , Rfl:   •  oxybutynin XL (DITROPAN-XL) 10 MG 24 hr tablet, Take 10 mg by mouth Daily., Disp: , Rfl:   •  Triamcinolone Acetonide (NASACORT AQ NA), 2 sprays into each nostril Daily., Disp: , Rfl:   •  Unable to find, 1 each Every Night. Med Name: C PAP MACHINE, Disp: , Rfl:   •  aspirin 81 MG EC tablet, Take 1 tablet by mouth Daily., Disp: , Rfl:   •  metoprolol succinate XL (TOPROL-XL) 25 MG 24 hr tablet, Take 1 tablet by mouth Daily., Disp: 90 tablet, Rfl: 3  •  rosuvastatin (CRESTOR) 20 MG tablet, Take 1 tablet by mouth Daily., Disp: 90 tablet, Rfl: 3    Allergies   Allergen Reactions   • Pneumococcal Vaccines Swelling     REDNESS AND SWELLING OF ARM    • Codeine Nausea And Vomiting   • Hydrocodone Nausea And Vomiting   • Sulfa Antibiotics Unknown - Low Severity         Past Medical History:   Diagnosis Date   • JOSH (acute kidney injury) (HCC) 6/10/2022    • Arthritis    • GERD (gastroesophageal reflux disease)    • Hypercholesteremia    • Hypertension    • Lung cancer (HCC) 2015    LEFT LUNG LOBECTOMY    • Lung cancer (HCC) 2017    RIGHT    • On home O2     2.5 L HS,    • Pneumonia    • Pneumothorax     AFTER LUNG BIOPSY    • Seasonal allergies    • Sleep apnea    • Wears dentures    • Wears glasses        Past Surgical History:   Procedure Laterality Date   • ADRENALECTOMY Left 2018    Procedure: ADRENALECTOMY LAPAROSCOPIC LEFT;  Surgeon: Carol Ann Guzmán MD;  Location:  [facility];  Service: General   • BREAST BIOPSY Right    • BREAST LUMPECTOMY Left    • BRONCHOSCOPY N/A 2017    Procedure: BRONCHOSCOPY;  Surgeon: Jeremy Herrera MD;  Location:  [facility];  Service:    • CHEST TUBE INSERTION      LEFT LUNG AFTER LUNG BIOPSY D/T PNEUMOTHORAX    • COLONOSCOPY     • GOLD SEED FIDUCIAL PLACEMENT  2018    left adrenal gland   • HYSTERECTOMY     • LUNG BIOPSY Bilateral     LEFT IN , RIGHT IN     • LUNG LOBECTOMY Left 2015    [facility]LE[facility] PER DR HERRERA    • TEETH EXTRACTION     • THORACOSCOPY VIDEO ASSISTED WITH LOBECTOMY Right 2017    Procedure: THORACOSCOPY VIDEO ASSISTED WITH RIGHT UPPER LOBE WEDGE RESECTION AND COMPLETION OF RIGHT UPPER LOBECTOMY, MEDIASTINAL LYMPH NODE DISSECTION AND INTERCOSTAL NERVE BLOCKS;  Surgeon: Jeremy Herrera MD;  Location:  [facility];  Service:        Family History   Problem Relation Age of Onset   • Hypertension Mother    • Heart attack Mother    • Heart attack Father    • Pneumonia Brother    • Leukemia Maternal Aunt        Social History     Tobacco Use   • Smoking status: Former Smoker     Packs/day: 0.50     Years: 35.00     Pack years: 17.50     Types: Cigarettes     Quit date: 2015     Years since quittin.8   • Smokeless tobacco: Never Used   • Tobacco comment: smoked up to 2ppd at times   Substance Use Topics   • Alcohol use: No       Review of Systems   Cardiovascular: Positive for leg  "swelling.   Respiratory: Positive for shortness of breath.    Neurological: Positive for dizziness.               /72 (BP Location: Right arm, Patient Position: Sitting)   Pulse 78   Ht 165.1 cm (65\")   Wt 95.8 kg (211 lb 3.2 oz)   SpO2 97%   BMI 35.15 kg/m²        Constitutional:       Appearance: Healthy appearance. Well-developed.   Eyes:      General: Lids are normal. No scleral icterus.     Conjunctiva/sclera: Conjunctivae normal.   HENT:      Head: Normocephalic and atraumatic.   Neck:      Thyroid: No thyromegaly.      Vascular: No carotid bruit or JVD.   Pulmonary:      Effort: Pulmonary effort is normal.      Breath sounds: Examination of the right-upper field reveals decreased breath sounds. Examination of the left-upper field reveals decreased breath sounds. Decreased breath sounds present. No wheezing. No rhonchi. No rales.   Cardiovascular:      Normal rate. Regular rhythm.      Murmurs: There is a grade 1/6 harsh midsystolic murmur at the URSB, radiating to the neck.      No gallop. No rub.   Pulses:     Intact distal pulses.   Edema:     Pretibial: bilateral 3+ edema of the pretibial area.     Ankle: bilateral 3+ edema of the ankle.  Abdominal:      General: There is no distension.      Palpations: Abdomen is soft. There is no abdominal mass.   Musculoskeletal:      Cervical back: Normal range of motion. Skin:     General: Skin is warm and dry.      Findings: No rash.   Neurological:      General: No focal deficit present.      Mental Status: Alert and oriented to person, place, and time.      Gait: Gait is intact.   Psychiatric:         Attention and Perception: Attention normal.         Mood and Affect: Mood normal.         Behavior: Behavior normal.             ECG 12 Lead    Date/Time: 6/10/2022 10:44 AM  Performed by: Devin Yanez IV, MD  Authorized by: Devin Yanez IV, MD   Comparison: compared with previous ECG from 4/22/2022  Comparison to previous ECG: " Borderline RBBB now RBBB  Rhythm: sinus rhythm  BPM: 77               Lab Results   Component Value Date    GLUCOSE 111 (H) 04/30/2022    BUN 52 (H) 04/30/2022    CREATININE 1.81 (H) 04/30/2022    BCR 28.7 (H) 04/30/2022    K 4.5 04/30/2022    CO2 22.1 04/30/2022    CALCIUM 8.9 04/30/2022    ALBUMIN 4.00 04/30/2022    AST 23 04/30/2022    ALT 30 04/30/2022      Lab Results   Component Value Date    WBC 15.00 (H) 04/30/2022    HGB 10.6 (L) 04/30/2022    HCT 32.4 (L) 04/30/2022    MCV 92.6 04/30/2022     04/30/2022            Problem List Items Addressed This Visit        Cardiology Problems    Coronary artery disease involving native coronary artery of native heart with angina pectoris (HCC) - Primary    Overview     · Coronary calcification noted on CT chest, 1/27/2022           Current Assessment & Plan     · No previous ischemic evaluation  · Given DRAPER as possible anginal equivalent, recommend testing  · Pharmacologic nuclear stress  · Continue low-dose aspirin  · Start metoprolol succinate 25 mg daily           Relevant Medications    aspirin 81 MG EC tablet    metoprolol succinate XL (TOPROL-XL) 25 MG 24 hr tablet    Other Relevant Orders    Stress Test With Myocardial Perfusion (1 Day)    Essential hypertension    Overview     • Target blood pressure <130/80 mmHg           Current Assessment & Plan     · We well-controlled  · Discontinue amlodipine due to swelling  · Will start loop diuretic if renal function acceptable           Relevant Medications    olmesartan-hydrochlorothiazide (BENICAR HCT) 40-12.5 MG per tablet    metoprolol succinate XL (TOPROL-XL) 25 MG 24 hr tablet    Hyperlipidemia LDL goal <70    Overview     • High intensity statin therapy indicated given the presence of CAD           Current Assessment & Plan     · Advise against high-dose simvastatin due to increased risk of rhabdo myalgia  · Discontinue simvastatin and ezetimibe  · Start rosuvastatin 20 mg nightly  · CMP and lipids in 6  "weeks           Relevant Medications    rosuvastatin (CRESTOR) 20 MG tablet    Other Relevant Orders    Comprehensive Metabolic Panel    Comprehensive Metabolic Panel    Lipid Panel       Other    JOSH (acute kidney injury) (HCC)    Current Assessment & Plan     · Creatinine 1.  8 on recent blood work (previously 1.3)  · Reassess with CMP today           Dyspnea on exertion    Overview     · Previous bilateral upper lobectomies  · Echo (5/8/22): EF 60%.  Trivial aortic stenosis. RVSP 37 mmHg.            Current Assessment & Plan     · Pulmonary evaluation \"okay\"  · Worsening lower extremity swelling, possible HFpEF  · Shortness of breath may be anginal equivalent  · Obtain proBNP  · Nuclear stress test  · If renal function okay, will prescribe furosemide             Relevant Orders    proBNP      Other Visit Diagnoses     Localized swelling of lower extremity        Relevant Orders    US Venous Doppler Lower Extremity Bilateral (duplex)                   · Pharmacologic nuclear stress test  · Bilateral lower extremity duplex  · Stop amlodipine due to swelling  · Obtain CMP and proBNP today  · Discontinue simvastatin and ezetimibe  · Start rosuvastatin 20 mg nightly  · CMP and lipids in 6 weeks  · Start metoprolol tartrate 25 mg daily  · If kidney function okay, will prescribe furosemide 20 mg and K-Dur 10 mill equivalents daily  Follow-up after testing          ARVIN Yanez MD, FACC, [facility]  Interventional Cardiology  06/10/22  10:49 EDT  "

## 2022-06-10 ENCOUNTER — OFFICE VISIT (OUTPATIENT)
Dept: CARDIOLOGY | Facility: CLINIC | Age: 82
End: 2022-06-10

## 2022-06-10 VITALS
HEIGHT: 65 IN | DIASTOLIC BLOOD PRESSURE: 72 MMHG | BODY MASS INDEX: 35.19 KG/M2 | HEART RATE: 78 BPM | WEIGHT: 211.2 LBS | OXYGEN SATURATION: 97 % | SYSTOLIC BLOOD PRESSURE: 130 MMHG

## 2022-06-10 DIAGNOSIS — N17.9 AKI (ACUTE KIDNEY INJURY): ICD-10-CM

## 2022-06-10 DIAGNOSIS — I25.119 CORONARY ARTERY DISEASE INVOLVING NATIVE CORONARY ARTERY OF NATIVE HEART WITH ANGINA PECTORIS: Primary | ICD-10-CM

## 2022-06-10 DIAGNOSIS — E78.5 HYPERLIPIDEMIA LDL GOAL <70: ICD-10-CM

## 2022-06-10 DIAGNOSIS — R06.09 DYSPNEA ON EXERTION: ICD-10-CM

## 2022-06-10 DIAGNOSIS — I10 ESSENTIAL HYPERTENSION: ICD-10-CM

## 2022-06-10 DIAGNOSIS — M79.89 LOCALIZED SWELLING OF LOWER EXTREMITY: ICD-10-CM

## 2022-06-10 PROBLEM — I35.0 NONRHEUMATIC AORTIC VALVE STENOSIS: Status: ACTIVE | Noted: 2022-06-08

## 2022-06-10 PROCEDURE — 99204 OFFICE O/P NEW MOD 45 MIN: CPT | Performed by: INTERNAL MEDICINE

## 2022-06-10 PROCEDURE — 93000 ELECTROCARDIOGRAM COMPLETE: CPT | Performed by: INTERNAL MEDICINE

## 2022-06-10 RX ORDER — OLMESARTAN MEDOXOMIL AND HYDROCHLOROTHIAZIDE 40/12.5 40; 12.5 MG/1; MG/1
1 TABLET ORAL DAILY
Start: 2022-06-10

## 2022-06-10 RX ORDER — METOPROLOL SUCCINATE 25 MG/1
25 TABLET, EXTENDED RELEASE ORAL DAILY
Qty: 90 TABLET | Refills: 3 | Status: SHIPPED | OUTPATIENT
Start: 2022-06-10

## 2022-06-10 RX ORDER — ASPIRIN 81 MG/1
81 TABLET ORAL DAILY
Start: 2022-06-10

## 2022-06-10 RX ORDER — ROSUVASTATIN CALCIUM 20 MG/1
20 TABLET, COATED ORAL DAILY
Qty: 90 TABLET | Refills: 3 | Status: SHIPPED | OUTPATIENT
Start: 2022-06-10 | End: 2022-11-09 | Stop reason: SDUPTHER

## 2022-06-10 RX ORDER — AMLODIPINE BESYLATE 5 MG/1
5 TABLET ORAL DAILY
Status: CANCELLED
Start: 2022-06-10

## 2022-06-10 NOTE — ASSESSMENT & PLAN NOTE
"· Pulmonary evaluation \"okay\"  · Worsening lower extremity swelling, possible HFpEF  · Shortness of breath may be anginal equivalent  · Obtain proBNP  · Nuclear stress test  · If renal function okay, will prescribe furosemide    "

## 2022-06-10 NOTE — ASSESSMENT & PLAN NOTE
· Advise against high-dose simvastatin due to increased risk of rhabdo myalgia  · Discontinue simvastatin and ezetimibe  · Start rosuvastatin 20 mg nightly  · CMP and lipids in 6 weeks

## 2022-06-10 NOTE — ASSESSMENT & PLAN NOTE
· We well-controlled  · Discontinue amlodipine due to swelling  · Will start loop diuretic if renal function acceptable

## 2022-06-10 NOTE — ASSESSMENT & PLAN NOTE
· No previous ischemic evaluation  · Given DRAPER as possible anginal equivalent, recommend testing  · Pharmacologic nuclear stress  · Continue low-dose aspirin  · Start metoprolol succinate 25 mg daily

## 2022-06-11 LAB
ALBUMIN SERPL-MCNC: 4.1 G/DL (ref 3.7–4.7)
ALBUMIN/GLOB SERPL: 1.7 {RATIO} (ref 1.2–2.2)
ALP SERPL-CCNC: 68 IU/L (ref 44–121)
ALT SERPL-CCNC: 12 IU/L (ref 0–32)
AMBIG ABBREV CMP14 DEFAULT: NORMAL
AMBIG ABBREV LP DEFAULT: NORMAL
AST SERPL-CCNC: 18 IU/L (ref 0–40)
BILIRUB SERPL-MCNC: 0.3 MG/DL (ref 0–1.2)
BUN SERPL-MCNC: 25 MG/DL (ref 8–27)
BUN/CREAT SERPL: 19 (ref 12–28)
CALCIUM SERPL-MCNC: 10.3 MG/DL (ref 8.7–10.3)
CHLORIDE SERPL-SCNC: 104 MMOL/L (ref 96–106)
CHOLEST SERPL-MCNC: 139 MG/DL (ref 100–199)
CO2 SERPL-SCNC: 23 MMOL/L (ref 20–29)
CREAT SERPL-MCNC: 1.29 MG/DL (ref 0.57–1)
EGFRCR SERPLBLD CKD-EPI 2021: 42 ML/MIN/1.73
GLOBULIN SER CALC-MCNC: 2.4 G/DL (ref 1.5–4.5)
GLUCOSE SERPL-MCNC: 102 MG/DL (ref 65–99)
HDLC SERPL-MCNC: 50 MG/DL
LDLC SERPL CALC-MCNC: 66 MG/DL (ref 0–99)
NT-PROBNP SERPL-MCNC: 62 PG/ML (ref 0–738)
POTASSIUM SERPL-SCNC: 5.2 MMOL/L (ref 3.5–5.2)
PROT SERPL-MCNC: 6.5 G/DL (ref 6–8.5)
SODIUM SERPL-SCNC: 144 MMOL/L (ref 134–144)
TRIGL SERPL-MCNC: 134 MG/DL (ref 0–149)
VLDLC SERPL CALC-MCNC: 23 MG/DL (ref 5–40)

## 2022-06-14 ENCOUNTER — TELEPHONE (OUTPATIENT)
Dept: CARDIOLOGY | Facility: CLINIC | Age: 82
End: 2022-06-14

## 2022-06-14 DIAGNOSIS — I10 ESSENTIAL HYPERTENSION: Primary | ICD-10-CM

## 2022-06-14 DIAGNOSIS — I25.119 CORONARY ARTERY DISEASE INVOLVING NATIVE CORONARY ARTERY OF NATIVE HEART WITH ANGINA PECTORIS: ICD-10-CM

## 2022-06-14 DIAGNOSIS — M79.89 LEG SWELLING: Primary | ICD-10-CM

## 2022-06-14 RX ORDER — POTASSIUM CHLORIDE 750 MG/1
10 TABLET, FILM COATED, EXTENDED RELEASE ORAL DAILY PRN
Qty: 30 TABLET | Refills: 1 | Status: SHIPPED | OUTPATIENT
Start: 2022-06-14 | End: 2022-11-08

## 2022-06-14 RX ORDER — FUROSEMIDE 20 MG/1
20 TABLET ORAL DAILY PRN
Qty: 30 TABLET | Refills: 1 | Status: SHIPPED | OUTPATIENT
Start: 2022-06-14 | End: 2022-11-08

## 2022-06-14 NOTE — TELEPHONE ENCOUNTER
----- Message from Devin Yanez IV, MD sent at 6/14/2022 11:18 AM EDT -----  Let her know that her kidney function is much improved from 1 month ago and that her cholesterol looks excellent

## 2022-06-14 NOTE — PROGRESS NOTES
Let her know that her kidney function is much improved from 1 month ago and that her cholesterol looks excellent

## 2022-06-14 NOTE — TELEPHONE ENCOUNTER
Spoke with the patient and she will try cutting Crestor in half. She also reports that she is still having some shortness of breath and leg swelling. OK to send in furosemide and K-Dur? I told her she could take it as needed.

## 2022-06-14 NOTE — TELEPHONE ENCOUNTER
Palmira called to report that she believes the patient is unable to take Crestor. Since starting last week, she has been nauseous and sick. Did you want her back on simvastatin and Zetia?

## 2022-06-22 ENCOUNTER — OFFICE VISIT (OUTPATIENT)
Dept: PULMONOLOGY | Facility: CLINIC | Age: 82
End: 2022-06-22

## 2022-06-22 DIAGNOSIS — J43.2 CENTRILOBULAR EMPHYSEMA: ICD-10-CM

## 2022-06-22 PROCEDURE — 94060 EVALUATION OF WHEEZING: CPT | Performed by: INTERNAL MEDICINE

## 2022-06-22 PROCEDURE — 94726 PLETHYSMOGRAPHY LUNG VOLUMES: CPT | Performed by: INTERNAL MEDICINE

## 2022-06-22 PROCEDURE — 94729 DIFFUSING CAPACITY: CPT | Performed by: INTERNAL MEDICINE

## 2022-06-24 ENCOUNTER — OFFICE VISIT (OUTPATIENT)
Dept: PULMONOLOGY | Facility: CLINIC | Age: 82
End: 2022-06-24

## 2022-06-24 VITALS
BODY MASS INDEX: 35.16 KG/M2 | RESPIRATION RATE: 20 BRPM | HEART RATE: 84 BPM | SYSTOLIC BLOOD PRESSURE: 158 MMHG | WEIGHT: 211 LBS | OXYGEN SATURATION: 94 % | DIASTOLIC BLOOD PRESSURE: 82 MMHG | HEIGHT: 65 IN | TEMPERATURE: 97.3 F

## 2022-06-24 DIAGNOSIS — G47.33 OSA (OBSTRUCTIVE SLEEP APNEA): ICD-10-CM

## 2022-06-24 DIAGNOSIS — J30.1 SEASONAL ALLERGIC RHINITIS DUE TO POLLEN: ICD-10-CM

## 2022-06-24 DIAGNOSIS — J43.2 CENTRILOBULAR EMPHYSEMA: Primary | ICD-10-CM

## 2022-06-24 DIAGNOSIS — C34.90 PRIMARY MALIGNANT NEOPLASM OF LUNG METASTATIC TO OTHER SITE, UNSPECIFIED LATERALITY: ICD-10-CM

## 2022-06-24 PROCEDURE — 99214 OFFICE O/P EST MOD 30 MIN: CPT | Performed by: NURSE PRACTITIONER

## 2022-06-24 NOTE — PROGRESS NOTES
Follow Up Office Visit      Patient Name: America Nix    Chief Complaint:    Chief Complaint   Patient presents with   • Breathing Problem     followup       History of Present Illness: America Nix is a 82 y.o. female who is here today for follow up of emphysema and VINNIE.  Since last visit, sleep study revealed severe sleep apnea with AHI of 32/h.  She has since been started on CPAP nightly.  Since being on CPAP, she notes that she now has more energy and is able to be more physically active.  She is currently on Breztri, though notes that she has not been using the inhaler daily.  She does, however, use albuterol daily.  She has a history of lung cancer and follows with hematology for chest imaging surveillance.  She notes that she is seeing cardiology and has a pending stress test.  Duration: COPD diagnosed in 2015  Supplemental Oxygen: 2L at nighttime    Subjective      Review of Systems:  Review of Systems   Constitutional: Negative for fever and unexpected weight change.   Respiratory: Positive for cough, shortness of breath and wheezing.    Cardiovascular: Positive for leg swelling.      Past Medical History:   Past Medical History:   Diagnosis Date   • JOSH (acute kidney injury) (HCC) 6/10/2022   • Arthritis    • GERD (gastroesophageal reflux disease)    • Hypercholesteremia    • Hypertension    • Lung cancer (HCC) 2015    LEFT LUNG LOBECTOMY    • Lung cancer (HCC) 2017    RIGHT    • On home O2     2.5 L HS,    • Pneumonia    • Pneumothorax 2015    AFTER LUNG BIOPSY    • Seasonal allergies    • Sleep apnea    • Wears dentures    • Wears glasses        Past Surgical History:   Past Surgical History:   Procedure Laterality Date   • ADRENALECTOMY Left 4/6/2018    Procedure: ADRENALECTOMY LAPAROSCOPIC LEFT;  Surgeon: Carol Ann Guzmán MD;  Location: [facility];  Service: General   • BREAST BIOPSY Right    • BREAST LUMPECTOMY Left    • BRONCHOSCOPY N/A 11/30/2017    Procedure: BRONCHOSCOPY;  Surgeon: Jeremy ALVARENGA  MD Sharon;  Location: [facility] OR;  Service:    • CHEST TUBE INSERTION      LEFT LUNG AFTER LUNG BIOPSY D/T PNEUMOTHORAX    • COLONOSCOPY     • GOLD SEED FIDUCIAL PLACEMENT  2018    left adrenal gland   • HYSTERECTOMY     • LUNG BIOPSY Bilateral     LEFT IN , RIGHT IN     • LUNG LOBECTOMY Left 2015    [facility] PER DR HERRERA    • TEETH EXTRACTION     • THORACOSCOPY VIDEO ASSISTED WITH LOBECTOMY Right 2017    Procedure: THORACOSCOPY VIDEO ASSISTED WITH RIGHT UPPER LOBE WEDGE RESECTION AND COMPLETION OF RIGHT UPPER LOBECTOMY, MEDIASTINAL LYMPH NODE DISSECTION AND INTERCOSTAL NERVE BLOCKS;  Surgeon: Jeremy Herrera MD;  Location: [facility] OR;  Service:        Family History:   Family History   Problem Relation Age of Onset   • Hypertension Mother    • Heart attack Mother    • Heart attack Father    • Pneumonia Brother    • Leukemia Maternal Aunt        Social History:   Social History     Socioeconomic History   • Marital status:      Spouse name: Hardy   • Number of children: 0   Tobacco Use   • Smoking status: Former Smoker     Packs/day: 0.50     Years: 35.00     Pack years: 17.50     Types: Cigarettes     Quit date: 2015     Years since quittin.9   • Smokeless tobacco: Never Used   • Tobacco comment: smoked up to 2ppd at times   Vaping Use   • Vaping Use: Never used   Substance and Sexual Activity   • Alcohol use: No   • Drug use: No   • Sexual activity: Defer       Current Medications:     Current Outpatient Medications:   •  albuterol (PROVENTIL) (2.5 MG/3ML) 0.083% nebulizer solution, USE 1 VIAL IN NEBULIZER 4 TIMES DAILY AS NEEDED, Disp: , Rfl:   •  aspirin 81 MG EC tablet, Take 1 tablet by mouth Daily., Disp: , Rfl:   •  Budeson-Glycopyrrol-Formoterol (Breztri Aerosphere) 160-9-4.8 MCG/ACT aerosol inhaler, Inhale 2 puffs As Needed., Disp: , Rfl:   •  Cholecalciferol (Vitamin D3) 50 MCG (2000 UT) capsule, Take 2,000 Units by mouth Daily., Disp: , Rfl:   •  docusate sodium  "(COLACE) 50 MG capsule, Take 50 mg by mouth Daily., Disp: , Rfl:   •  furosemide (LASIX) 20 MG tablet, Take 1 tablet by mouth Daily As Needed (leg swelling/shortness of breath)., Disp: 30 tablet, Rfl: 1  •  guaiFENesin (MUCINEX) 600 MG 12 hr tablet, Take 1,200 mg by mouth 2 (Two) Times a Day., Disp: , Rfl:   •  loratadine (CLARITIN) 10 MG tablet, Take 10 mg by mouth Daily., Disp: , Rfl:   •  metoprolol succinate XL (TOPROL-XL) 25 MG 24 hr tablet, Take 1 tablet by mouth Daily., Disp: 90 tablet, Rfl: 3  •  montelukast (SINGULAIR) 10 MG tablet, Take 10 mg by mouth Daily As Needed (allergies)., Disp: , Rfl:   •  olmesartan-hydrochlorothiazide (BENICAR HCT) 40-12.5 MG per tablet, Take 1 tablet by mouth Daily. 1/2 tab in the AM and 1/2 tab in the PM, Disp: , Rfl:   •  omeprazole (priLOSEC) 20 MG capsule, Take 20 mg by mouth As Needed., Disp: , Rfl:   •  ondansetron (ZOFRAN) 4 MG tablet, , Disp: , Rfl:   •  oxybutynin XL (DITROPAN-XL) 10 MG 24 hr tablet, Take 10 mg by mouth Daily., Disp: , Rfl:   •  potassium chloride 10 MEQ CR tablet, Take 1 tablet by mouth Daily As Needed (leg swelling/shortness of breath)., Disp: 30 tablet, Rfl: 1  •  rosuvastatin (CRESTOR) 20 MG tablet, Take 1 tablet by mouth Daily., Disp: 90 tablet, Rfl: 3  •  Triamcinolone Acetonide (NASACORT AQ NA), 2 sprays into each nostril Daily., Disp: , Rfl:   •  Unable to find, 1 each Every Night. Med Name: C PAP MACHINE, Disp: , Rfl:      Allergies:   Allergies   Allergen Reactions   • Pneumococcal Vaccines Swelling     REDNESS AND SWELLING OF ARM    • Codeine Nausea And Vomiting   • Hydrocodone Nausea And Vomiting   • Sulfa Antibiotics Unknown - Low Severity       Objective     Physical Exam:  Vital Signs:   Vitals:    06/24/22 0937   BP: 158/82   Pulse: 84   Resp: 20   Temp: 97.3 °F (36.3 °C)   SpO2: 94%   Weight: 95.7 kg (211 lb)   Height: 165.1 cm (65\")     Body mass index is 35.11 kg/m².    Physical Exam  Constitutional:       General: She is not in " acute distress.     Appearance: She is obese. She is not toxic-appearing.   HENT:      Head: Normocephalic and atraumatic.   Eyes:      Extraocular Movements: Extraocular movements intact.   Cardiovascular:      Rate and Rhythm: Regular rhythm.      Heart sounds: Normal heart sounds.   Pulmonary:      Effort: Pulmonary effort is normal.      Breath sounds: Rhonchi present.   Abdominal:      General: There is no distension.   Musculoskeletal:         General: Swelling present.      Cervical back: Neck supple.   Skin:     General: Skin is warm and dry.      Findings: No rash.   Neurological:      General: No focal deficit present.      Mental Status: She is alert and oriented to person, place, and time.   Psychiatric:         Mood and Affect: Mood normal.         Behavior: Behavior normal.       Results Review:   June 2022 PFTs showed no obstruction with good bronchodilator response.  No restriction without air trapping.  Mild impairment in diffusing capacity when adjusted for alveolar volume.    January 2022 chest, abdomen and pelvis CT scan with contrast showed stable CT appearance of the chest, abdomen and pelvis.  No evidence of recurrent or metastatic disease.  Lung fields demonstrate chronic interstitial changes with some peripheral reticulation noted posteriorly in the right and minimal scattered areas of intervening groundglass opacity.      Assessment / Plan      Assessment/Plan:   Diagnoses and all orders for this visit:    1. Centrilobular emphysema (HCC) (Primary)  Recent PFT showed FEV1 of 77%, ratio was not consistent with obstruction with significant bronchodilator response was noted and DLCO is reduced.  Patient was advised to resume use of Breztri.We discussed the risk and benefits of inhaled corticosteroids. Patient instructed to take them on a regular basis as prescribed. Patient instructed to rinse their mouth out after each use.     2. VINNIE (obstructive sleep apnea)  Continue on CPAP nightly.    3.  Primary malignant neoplasm of lung metastatic to other site, unspecified laterality (HCC)  Continues to follow with oncology for surveillance scans.    4. Seasonal allergic rhinitis due to pollen  Controlled on oral antihistamine and Singulair.       Follow Up:   Return in about 6 months (around 12/24/2022) for Recheck.  Please note that portions of this note may have been completed with a voice recognition program. Efforts were made to edit the dictations, but occasionally words are mistranscribed.    JENN Emmanuel   Pulmonary Medicine [facility]     The patient was counseled on diagnostic results, risks and benefits of treatment options, risk factor modifications and the importance of treatment compliance. The patient was advised to contact the clinic with concerns or worsening symptoms.

## 2022-07-11 ENCOUNTER — TELEPHONE (OUTPATIENT)
Dept: CARDIOLOGY | Facility: CLINIC | Age: 82
End: 2022-07-11

## 2022-07-11 ENCOUNTER — HOSPITAL ENCOUNTER (OUTPATIENT)
Dept: CARDIOLOGY | Facility: HOSPITAL | Age: 82
Discharge: HOME | End: 2022-07-11

## 2022-07-11 DIAGNOSIS — M79.89 LEG SWELLING: ICD-10-CM

## 2022-07-11 DIAGNOSIS — I25.119 CORONARY ARTERY DISEASE INVOLVING NATIVE CORONARY ARTERY OF NATIVE HEART WITH ANGINA PECTORIS: ICD-10-CM

## 2022-07-11 LAB
BH CV LOWER VASCULAR LEFT COMMON FEMORAL AUGMENT: NORMAL
BH CV LOWER VASCULAR LEFT COMMON FEMORAL COMPRESS: NORMAL
BH CV LOWER VASCULAR LEFT COMMON FEMORAL PHASIC: NORMAL
BH CV LOWER VASCULAR LEFT COMMON FEMORAL SPONT: NORMAL
BH CV LOWER VASCULAR LEFT DISTAL FEMORAL COMPRESS: NORMAL
BH CV LOWER VASCULAR LEFT GASTRONEMIUS COMPRESS: NORMAL
BH CV LOWER VASCULAR LEFT GREATER SAPH AK COMPRESS: NORMAL
BH CV LOWER VASCULAR LEFT GREATER SAPH BK COMPRESS: NORMAL
BH CV LOWER VASCULAR LEFT LESSER SAPH COMPRESS: NORMAL
BH CV LOWER VASCULAR LEFT MID FEMORAL AUGMENT: NORMAL
BH CV LOWER VASCULAR LEFT MID FEMORAL COMPRESS: NORMAL
BH CV LOWER VASCULAR LEFT MID FEMORAL PHASIC: NORMAL
BH CV LOWER VASCULAR LEFT MID FEMORAL SPONT: NORMAL
BH CV LOWER VASCULAR LEFT PERONEAL COMPRESS: NORMAL
BH CV LOWER VASCULAR LEFT POPLITEAL AUGMENT: NORMAL
BH CV LOWER VASCULAR LEFT POPLITEAL COMPRESS: NORMAL
BH CV LOWER VASCULAR LEFT POPLITEAL PHASIC: NORMAL
BH CV LOWER VASCULAR LEFT POPLITEAL SPONT: NORMAL
BH CV LOWER VASCULAR LEFT POSTERIOR TIBIAL COMPRESS: NORMAL
BH CV LOWER VASCULAR LEFT PROFUNDA FEMORAL COMPRESS: NORMAL
BH CV LOWER VASCULAR LEFT PROXIMAL FEMORAL COMPRESS: NORMAL
BH CV LOWER VASCULAR LEFT SAPHENOFEMORAL JUNCTION COMPRESS: NORMAL
BH CV LOWER VASCULAR RIGHT COMMON FEMORAL AUGMENT: NORMAL
BH CV LOWER VASCULAR RIGHT COMMON FEMORAL COMPRESS: NORMAL
BH CV LOWER VASCULAR RIGHT COMMON FEMORAL PHASIC: NORMAL
BH CV LOWER VASCULAR RIGHT COMMON FEMORAL SPONT: NORMAL
BH CV LOWER VASCULAR RIGHT DISTAL FEMORAL COMPRESS: NORMAL
BH CV LOWER VASCULAR RIGHT GASTRONEMIUS COMPRESS: NORMAL
BH CV LOWER VASCULAR RIGHT GREATER SAPH AK COMPRESS: NORMAL
BH CV LOWER VASCULAR RIGHT GREATER SAPH BK COMPRESS: NORMAL
BH CV LOWER VASCULAR RIGHT LESSER SAPH COMPRESS: NORMAL
BH CV LOWER VASCULAR RIGHT MID FEMORAL AUGMENT: NORMAL
BH CV LOWER VASCULAR RIGHT MID FEMORAL COMPRESS: NORMAL
BH CV LOWER VASCULAR RIGHT MID FEMORAL PHASIC: NORMAL
BH CV LOWER VASCULAR RIGHT MID FEMORAL SPONT: NORMAL
BH CV LOWER VASCULAR RIGHT PERONEAL COMPRESS: NORMAL
BH CV LOWER VASCULAR RIGHT POPLITEAL AUGMENT: NORMAL
BH CV LOWER VASCULAR RIGHT POPLITEAL COMPRESS: NORMAL
BH CV LOWER VASCULAR RIGHT POPLITEAL PHASIC: NORMAL
BH CV LOWER VASCULAR RIGHT POPLITEAL SPONT: NORMAL
BH CV LOWER VASCULAR RIGHT POSTERIOR TIBIAL COMPRESS: NORMAL
BH CV LOWER VASCULAR RIGHT PROFUNDA FEMORAL COMPRESS: NORMAL
BH CV LOWER VASCULAR RIGHT PROXIMAL FEMORAL COMPRESS: NORMAL
BH CV LOWER VASCULAR RIGHT SAPHENOFEMORAL JUNCTION COMPRESS: NORMAL
BH CV REST NUCLEAR ISOTOPE DOSE: 9.2 MCI
BH CV STRESS BP STAGE 2: NORMAL
BH CV STRESS BP STAGE 4: NORMAL
BH CV STRESS COMMENTS STAGE 1: NORMAL
BH CV STRESS DOSE REGADENOSON STAGE 1: 0.4
BH CV STRESS DURATION MIN STAGE 1: 1
BH CV STRESS DURATION MIN STAGE 2: 1
BH CV STRESS DURATION MIN STAGE 3: 1
BH CV STRESS DURATION MIN STAGE 4: 1
BH CV STRESS DURATION SEC STAGE 1: 10
BH CV STRESS DURATION SEC STAGE 2: 0
BH CV STRESS HR STAGE 1: 92
BH CV STRESS HR STAGE 2: 99
BH CV STRESS HR STAGE 3: 96
BH CV STRESS HR STAGE 4: 94
BH CV STRESS NUCLEAR ISOTOPE DOSE: 31.7 MCI
BH CV STRESS O2 STAGE 1: 93
BH CV STRESS O2 STAGE 2: 93
BH CV STRESS O2 STAGE 3: 98
BH CV STRESS O2 STAGE 4: 96
BH CV STRESS PROTOCOL 1: NORMAL
BH CV STRESS RECOVERY BP: NORMAL MMHG
BH CV STRESS RECOVERY HR: 93 BPM
BH CV STRESS RECOVERY O2: 95 %
BH CV STRESS STAGE 1: 1
BH CV STRESS STAGE 2: 2
BH CV STRESS STAGE 3: 3
BH CV STRESS STAGE 4: 4
LV EF NUC BP: 83 %
MAXIMAL PREDICTED HEART RATE: 140 BPM
MAXIMAL PREDICTED HEART RATE: 140 BPM
PERCENT MAX PREDICTED HR: 70.71 %
STRESS BASELINE BP: NORMAL MMHG
STRESS BASELINE HR: 83 BPM
STRESS O2 SAT REST: 95 %
STRESS PERCENT HR: 83 %
STRESS POST ESTIMATED WORKLOAD: 1 METS
STRESS POST EXERCISE DUR MIN: 4 MIN
STRESS POST EXERCISE DUR SEC: 0 SEC
STRESS POST O2 SAT PEAK: 93 %
STRESS POST PEAK BP: NORMAL MMHG
STRESS POST PEAK HR: 99 BPM
STRESS TARGET HR: 119 BPM
STRESS TARGET HR: 119 BPM

## 2022-07-11 PROCEDURE — 0 TECHNETIUM SESTAMIBI: Performed by: INTERNAL MEDICINE

## 2022-07-11 PROCEDURE — 93017 CV STRESS TEST TRACING ONLY: CPT

## 2022-07-11 PROCEDURE — 25010000002 REGADENOSON 0.4 MG/5ML SOLUTION: Performed by: INTERNAL MEDICINE

## 2022-07-11 PROCEDURE — 93970 EXTREMITY STUDY: CPT

## 2022-07-11 PROCEDURE — 78452 HT MUSCLE IMAGE SPECT MULT: CPT

## 2022-07-11 PROCEDURE — 93970 EXTREMITY STUDY: CPT | Performed by: INTERNAL MEDICINE

## 2022-07-11 PROCEDURE — A9500 TC99M SESTAMIBI: HCPCS | Performed by: INTERNAL MEDICINE

## 2022-07-11 PROCEDURE — 93018 CV STRESS TEST I&R ONLY: CPT | Performed by: INTERNAL MEDICINE

## 2022-07-11 PROCEDURE — 78452 HT MUSCLE IMAGE SPECT MULT: CPT | Performed by: INTERNAL MEDICINE

## 2022-07-11 RX ADMIN — REGADENOSON 0.4 MG: 0.08 INJECTION, SOLUTION INTRAVENOUS at 09:46

## 2022-07-11 RX ADMIN — TECHNETIUM TC 99M SESTAMIBI 1 DOSE: 1 INJECTION INTRAVENOUS at 09:51

## 2022-07-11 RX ADMIN — TECHNETIUM TC 99M SESTAMIBI 1 DOSE: 1 INJECTION INTRAVENOUS at 09:10

## 2022-08-01 ENCOUNTER — TELEPHONE (OUTPATIENT)
Dept: CARDIOLOGY | Facility: CLINIC | Age: 82
End: 2022-08-01

## 2022-08-01 PROBLEM — N17.9 AKI (ACUTE KIDNEY INJURY): Status: RESOLVED | Noted: 2022-06-10 | Resolved: 2022-08-01

## 2022-08-01 NOTE — TELEPHONE ENCOUNTER
Patient called to report that BP has been elevated. SBP ranging 159-179. Last /89. She is concerned since she has stopped her amlodipine that her BP has increased. You had stopped it d/t lower extremity swelling.     Your last note mentions starting furosemide and KDur. Did you want to add anything else?  Lab Results   Component Value Date    GLUCOSE 102 (H) 06/10/2022    BUN 25 06/10/2022    CREATININE 1.29 (H) 06/10/2022    EGFRIFNONA 45 (L) 07/20/2020    EGFRIFAFRI 55 (L) 07/20/2020    BCR 19 06/10/2022    K 5.2 06/10/2022    CO2 23 06/10/2022    CALCIUM 10.3 06/10/2022    PROTENTOTREF 6.5 06/10/2022    ALBUMIN 4.1 06/10/2022    LABIL2 1.7 06/10/2022    AST 18 06/10/2022    ALT 12 06/10/2022

## 2022-08-01 NOTE — TELEPHONE ENCOUNTER
See how often she is taking the Lasix/KCl.  If not daily, have her take it daily and get a BMP in 2 weeks.  If she is already taken daily, let me know

## 2022-08-01 NOTE — TELEPHONE ENCOUNTER
Unable to reach the patient on any numbers listed. Left message for Palmira to have her return the call to verify doses.

## 2022-08-02 NOTE — TELEPHONE ENCOUNTER
Patient called back and reports she stopped her olmesartan/HCTZ in error but will restart and get repeat labs. Will mail order.

## 2022-11-08 RX ORDER — FUROSEMIDE 20 MG/1
TABLET ORAL
Qty: 30 TABLET | Refills: 0 | Status: SHIPPED | OUTPATIENT
Start: 2022-11-08

## 2022-11-08 RX ORDER — POTASSIUM CHLORIDE 750 MG/1
TABLET, FILM COATED, EXTENDED RELEASE ORAL
Qty: 30 TABLET | Refills: 0 | Status: SHIPPED | OUTPATIENT
Start: 2022-11-08

## 2022-11-09 ENCOUNTER — OFFICE VISIT (OUTPATIENT)
Dept: CARDIOLOGY | Facility: CLINIC | Age: 82
End: 2022-11-09

## 2022-11-09 VITALS
DIASTOLIC BLOOD PRESSURE: 72 MMHG | HEART RATE: 73 BPM | SYSTOLIC BLOOD PRESSURE: 140 MMHG | WEIGHT: 191.2 LBS | BODY MASS INDEX: 31.86 KG/M2 | HEIGHT: 65 IN | OXYGEN SATURATION: 97 %

## 2022-11-09 DIAGNOSIS — I25.119 CORONARY ARTERY DISEASE INVOLVING NATIVE CORONARY ARTERY OF NATIVE HEART WITH ANGINA PECTORIS: Primary | ICD-10-CM

## 2022-11-09 DIAGNOSIS — R06.09 DYSPNEA ON EXERTION: ICD-10-CM

## 2022-11-09 DIAGNOSIS — E78.5 HYPERLIPIDEMIA LDL GOAL <70: ICD-10-CM

## 2022-11-09 PROBLEM — I35.0 NONRHEUMATIC AORTIC VALVE STENOSIS: Status: RESOLVED | Noted: 2022-06-08 | Resolved: 2022-11-09

## 2022-11-09 PROCEDURE — 99213 OFFICE O/P EST LOW 20 MIN: CPT | Performed by: INTERNAL MEDICINE

## 2022-11-09 RX ORDER — FOLIC ACID 1 MG/1
1000 TABLET ORAL DAILY
COMMUNITY
Start: 2022-08-15

## 2022-11-09 RX ORDER — ROSUVASTATIN CALCIUM 10 MG/1
10 TABLET, COATED ORAL DAILY
Start: 2022-11-09

## 2022-11-09 NOTE — ASSESSMENT & PLAN NOTE
· Dyspnea improved  · Normal ischemic evaluation  · Essentially normal echo  · Suspect dyspnea related to upper lobectomy

## 2022-11-09 NOTE — PROGRESS NOTES
Cardiology Outpatient Visit      Identification: America Nix is a 82 y.o. female who resides in [place], [place].    Reason for visit:  · Dyspnea    Assessment     Problem List Items Addressed This Visit        Cardiology Problems    Coronary artery disease involving native coronary artery of native heart with angina pectoris (HCC) - Primary    Overview     · Coronary calcification noted on CT chest, 1/27/2022  · Pharmacologic nuclear stress (7/11/2022): No ischemia/. LVEF > 70%.          Current Assessment & Plan     · No angina  · Continue low-dose aspirin and statin therapy         Hyperlipidemia LDL goal <70    Overview     • High intensity statin therapy indicated given the presence of CAD         Current Assessment & Plan     · Reasonably controlled  · Continue rosuvastatin 10 mg nightly         Relevant Medications    rosuvastatin (CRESTOR) 10 MG tablet       Other    Dyspnea on exertion    Overview     · Previous bilateral upper lobectomies  · Echo (5/8/22): EF 60%.  Trivial aortic stenosis. RVSP 37 mmHg.   · Venous duplex (7/11/2022): Normal.         Current Assessment & Plan     · Dyspnea improved  · Normal ischemic evaluation  · Essentially normal echo  · Suspect dyspnea related to upper lobectomy            Plan   • Continue present medical therapy      Follow-up   · Return if cardiac symptoms develop        Subjective      Ms. Nix states she is doing well and feels well.  She stays active.  Her shortness of breath is manageable.  No chest pain.  She had echo and nuclear stress testing this year which have been unremarkable.    Review of Systems   Constitutional: Negative for malaise/fatigue.   Eyes: Negative for vision loss in left eye and vision loss in right eye.   Cardiovascular: Positive for dyspnea on exertion. Negative for chest pain, near-syncope, orthopnea, palpitations, paroxysmal nocturnal dyspnea and syncope.   Musculoskeletal: Negative for myalgias.   Neurological: Negative for brief  "paralysis, excessive daytime sleepiness, focal weakness, numbness, paresthesias and weakness.   All other systems reviewed and are negative.      Objective     /72 (BP Location: Right arm, Patient Position: Sitting)   Pulse 73   Ht 165.1 cm (65\")   Wt 86.7 kg (191 lb 3.2 oz)   SpO2 97%   BMI 31.82 kg/m²       Constitutional:       Appearance: Healthy appearance. Well-developed.   Eyes:      General: Lids are normal. No scleral icterus.  HENT:      Head: Normocephalic and atraumatic.   Neck:      Thyroid: No thyroid mass.      Vascular: No carotid bruit or JVD. JVD normal.   Pulmonary:      Effort: Pulmonary effort is normal.      Breath sounds: Normal breath sounds.   Cardiovascular:      Normal rate. Regular rhythm.      Murmurs: There is no murmur.      No gallop.   Musculoskeletal:      Extremities: No clubbing present.Skin:     General: Skin is warm and dry. There is no cyanosis.   Neurological:      General: No focal deficit present.      Mental Status: Alert.   Psychiatric:         Attention and Perception: Attention normal.         Behavior: Behavior normal. Behavior is cooperative.         Result Review  (reviewed with patient):          Labs (8/12/2022):  · Chol 154, Trig 121, HDL 53, LDL 79  · Glucose 102, Creat 1.14, BUN 31, Na 140, K 4.7, ALT 13, AST 14  · TSH 3.55  · HA1C 5.5  · WBC 9.1, RBC 4.2, HGB 11.5, HCT 36.7,     Billy Yanez MD, [facility]  11/9/2022  "

## 2023-01-30 ENCOUNTER — HOSPITAL ENCOUNTER (OUTPATIENT)
Dept: CT IMAGING | Facility: HOSPITAL | Age: 83
Discharge: HOME | End: 2023-01-30
Admitting: NURSE PRACTITIONER
Payer: MEDICARE

## 2023-01-30 DIAGNOSIS — C79.72 MALIGNANT NEOPLASM METASTATIC TO LEFT ADRENAL GLAND: ICD-10-CM

## 2023-01-30 LAB — CREAT BLDA-MCNC: 1.3 MG/DL (ref 0.6–1.3)

## 2023-01-30 PROCEDURE — 71260 CT THORAX DX C+: CPT

## 2023-01-30 PROCEDURE — 74177 CT ABD & PELVIS W/CONTRAST: CPT

## 2023-01-30 PROCEDURE — 25010000002 IOPAMIDOL 61 % SOLUTION: Performed by: NURSE PRACTITIONER

## 2023-01-30 PROCEDURE — 82565 ASSAY OF CREATININE: CPT

## 2023-01-30 RX ADMIN — IOPAMIDOL 95 ML: 612 INJECTION, SOLUTION INTRAVENOUS at 10:48

## 2023-02-01 ENCOUNTER — OFFICE VISIT (OUTPATIENT)
Dept: ONCOLOGY | Facility: CLINIC | Age: 83
End: 2023-02-01
Payer: MEDICARE

## 2023-02-01 VITALS
BODY MASS INDEX: 32.49 KG/M2 | OXYGEN SATURATION: 97 % | RESPIRATION RATE: 20 BRPM | HEIGHT: 65 IN | HEART RATE: 80 BPM | TEMPERATURE: 97.1 F | DIASTOLIC BLOOD PRESSURE: 55 MMHG | WEIGHT: 195 LBS | SYSTOLIC BLOOD PRESSURE: 103 MMHG

## 2023-02-01 DIAGNOSIS — C79.72 MALIGNANT NEOPLASM METASTATIC TO LEFT ADRENAL GLAND: Primary | ICD-10-CM

## 2023-02-01 PROCEDURE — 99214 OFFICE O/P EST MOD 30 MIN: CPT | Performed by: INTERNAL MEDICINE

## 2023-02-06 ENCOUNTER — OFFICE VISIT (OUTPATIENT)
Dept: PULMONOLOGY | Facility: CLINIC | Age: 83
End: 2023-02-06
Payer: MEDICARE

## 2023-02-06 VITALS
HEART RATE: 79 BPM | BODY MASS INDEX: 32.65 KG/M2 | WEIGHT: 196 LBS | HEIGHT: 65 IN | DIASTOLIC BLOOD PRESSURE: 58 MMHG | SYSTOLIC BLOOD PRESSURE: 104 MMHG | OXYGEN SATURATION: 95 %

## 2023-02-06 DIAGNOSIS — C34.90 PRIMARY MALIGNANT NEOPLASM OF LUNG METASTATIC TO OTHER SITE, UNSPECIFIED LATERALITY: ICD-10-CM

## 2023-02-06 DIAGNOSIS — G47.33 OSA ON CPAP: ICD-10-CM

## 2023-02-06 DIAGNOSIS — Z99.89 OSA ON CPAP: ICD-10-CM

## 2023-02-06 DIAGNOSIS — J43.2 CENTRILOBULAR EMPHYSEMA: Primary | ICD-10-CM

## 2023-02-06 PROCEDURE — 99213 OFFICE O/P EST LOW 20 MIN: CPT | Performed by: NURSE PRACTITIONER

## 2023-02-06 RX ORDER — CIPROFLOXACIN 500 MG/1
500 TABLET, FILM COATED ORAL 2 TIMES DAILY
COMMUNITY

## 2023-02-06 RX ORDER — ROSUVASTATIN CALCIUM 20 MG/1
1 TABLET, COATED ORAL DAILY
COMMUNITY
Start: 2022-12-03

## 2023-02-06 RX ORDER — CEFDINIR 300 MG/1
300 CAPSULE ORAL 2 TIMES DAILY
COMMUNITY
End: 2023-02-06

## 2023-02-06 RX ORDER — BUDESONIDE, GLYCOPYRROLATE, AND FORMOTEROL FUMARATE 160; 9; 4.8 UG/1; UG/1; UG/1
2 AEROSOL, METERED RESPIRATORY (INHALATION) 2 TIMES DAILY
Qty: 1 EACH | Refills: 5 | Status: SHIPPED | OUTPATIENT
Start: 2023-02-06

## 2023-02-06 NOTE — PROGRESS NOTES
Follow Up Office Visit      Patient Name: America Nix    Chief Complaint:    Chief Complaint   Patient presents with   • Follow-up   • Shortness of Breath       History of Present Illness: America Nix is a 83 y.o. female who is here today for follow up.  Since last visit, patient reports that her breathing has been doing well.  She is using Breztri.  She notes occasional wheezing which ceases use of nebulized albuterol.  She notes that she is currently completing a course of antibiotics for management of a sinus infection.  She continues on CPAP nightly with good clinical improvement in VINNIE symptoms.  + Mild bilateral ankle swelling.    Supplemental Oxygen: 2L qhs bled into CPAP    Subjective      Review of Systems:  Review of Systems   Constitutional: Negative for fatigue, fever and unexpected weight change.   Respiratory: Positive for shortness of breath and wheezing. Negative for cough.    Cardiovascular: Positive for leg swelling. Negative for chest pain.        Past Medical History:   Past Medical History:   Diagnosis Date   • JOSH (acute kidney injury) (HCC) 6/10/2022   • Arthritis    • GERD (gastroesophageal reflux disease)    • Hypercholesteremia    • Hypertension    • Lung cancer (HCC) 2015    LEFT LUNG LOBECTOMY    • Lung cancer (HCC) 2017    RIGHT    • On home O2     2.5 L HS,    • Pneumonia    • Pneumothorax 2015    AFTER LUNG BIOPSY    • Seasonal allergies    • Sleep apnea    • Wears dentures    • Wears glasses        Past Surgical History:   Past Surgical History:   Procedure Laterality Date   • ADRENALECTOMY Left 4/6/2018    Procedure: ADRENALECTOMY LAPAROSCOPIC LEFT;  Surgeon: Carol Ann Guzmán MD;  Location:  [facility] OR;  Service: General   • BREAST BIOPSY Right    • BREAST LUMPECTOMY Left    • BRONCHOSCOPY N/A 11/30/2017    Procedure: BRONCHOSCOPY;  Surgeon: Jeremy Herrera MD;  Location:  [facility] OR;  Service:    • CHEST TUBE INSERTION  2015    LEFT LUNG AFTER LUNG BIOPSY D/T  PNEUMOTHORAX    • COLONOSCOPY     • GOLD SEED FIDUCIAL PLACEMENT  2018    left adrenal gland   • HYSTERECTOMY     • LUNG BIOPSY Bilateral     LEFT IN , RIGHT IN 2017    • LUNG LOBECTOMY Left 2015    [facility] PER DR HERRERA    • TEETH EXTRACTION     • THORACOSCOPY VIDEO ASSISTED WITH LOBECTOMY Right 2017    Procedure: THORACOSCOPY VIDEO ASSISTED WITH RIGHT UPPER LOBE WEDGE RESECTION AND COMPLETION OF RIGHT UPPER LOBECTOMY, MEDIASTINAL LYMPH NODE DISSECTION AND INTERCOSTAL NERVE BLOCKS;  Surgeon: Jeremy Herrera MD;  [facility];  Service:        Family History:   Family History   Problem Relation Age of Onset   • Hypertension Mother    • Heart attack Mother    • Heart attack Father    • Pneumonia Brother    • Leukemia Maternal Aunt        Social History:   Social History     Socioeconomic History   • Marital status:      Spouse name: Hardy   • Number of children: 0   Tobacco Use   • Smoking status: Former     Packs/day: 0.50     Years: 35.00     Pack years: 17.50     Types: Cigarettes     Quit date: 2015     Years since quittin.5   • Smokeless tobacco: Never   • Tobacco comments:     smoked up to 2ppd at times   Vaping Use   • Vaping Use: Never used   Substance and Sexual Activity   • Alcohol use: No   • Drug use: No   • Sexual activity: Defer       Current Medications:     Current Outpatient Medications:   •  albuterol (PROVENTIL) (2.5 MG/3ML) 0.083% nebulizer solution, USE 1 VIAL IN NEBULIZER 4 TIMES DAILY AS NEEDED, Disp: , Rfl:   •  aspirin 81 MG EC tablet, Take 1 tablet by mouth Daily., Disp: , Rfl:   •  Budeson-Glycopyrrol-Formoterol (Breztri Aerosphere) 160-9-4.8 MCG/ACT aerosol inhaler, Inhale 2 puffs 2 (Two) Times a Day. Rinse mouth out after use, Disp: 1 each, Rfl: 5  •  Cholecalciferol (Vitamin D3) 50 MCG (2000 UT) capsule, Take 2,000 Units by mouth Daily., Disp: , Rfl:   •  ciprofloxacin (CIPRO) 500 MG tablet, Take 500 mg by mouth 2 (Two) Times a Day., Disp: , Rfl:    •  docusate sodium (COLACE) 50 MG capsule, Take 50 mg by mouth Daily., Disp: , Rfl:   •  folic acid (FOLVITE) 1 MG tablet, Take 1 tablet by mouth Daily., Disp: , Rfl:   •  furosemide (LASIX) 20 MG tablet, TAKE 1 TABLET BY MOUTH ONCE DAILY AS NEEDED (LEG  SWELLING/SHORTNESS  OF  BREATH), Disp: 30 tablet, Rfl: 0  •  guaiFENesin (MUCINEX) 600 MG 12 hr tablet, Take 1,200 mg by mouth 2 (Two) Times a Day., Disp: , Rfl:   •  loratadine (CLARITIN) 10 MG tablet, Take 10 mg by mouth Daily., Disp: , Rfl:   •  metoprolol succinate XL (TOPROL-XL) 25 MG 24 hr tablet, Take 1 tablet by mouth Daily., Disp: 90 tablet, Rfl: 3  •  montelukast (SINGULAIR) 10 MG tablet, Take 10 mg by mouth Daily As Needed (allergies)., Disp: , Rfl:   •  olmesartan-hydrochlorothiazide (BENICAR HCT) 40-12.5 MG per tablet, Take 1 tablet by mouth Daily. 1/2 tab in the AM and 1/2 tab in the PM, Disp: , Rfl:   •  omeprazole (priLOSEC) 20 MG capsule, Take 20 mg by mouth As Needed., Disp: , Rfl:   •  ondansetron (ZOFRAN) 4 MG tablet, 1 tablet As Needed., Disp: , Rfl:   •  oxybutynin XL (DITROPAN-XL) 10 MG 24 hr tablet, Take 10 mg by mouth Daily., Disp: , Rfl:   •  potassium chloride 10 MEQ CR tablet, TAKE 1 TABLET BY MOUTH ONCE DAILY AS NEEDED (LEG  SWELLING/SHORTNESS  OF  BREATH), Disp: 30 tablet, Rfl: 0  •  rosuvastatin (CRESTOR) 10 MG tablet, Take 1 tablet by mouth Daily., Disp: , Rfl:   •  Triamcinolone Acetonide (NASACORT AQ NA), 2 sprays into each nostril Daily., Disp: , Rfl:   •  Unable to find, 1 each Every Night. Med Name: C PAP MACHINE, Disp: , Rfl:   •  rosuvastatin (CRESTOR) 20 MG tablet, Take 1 tablet by mouth Daily., Disp: , Rfl:      Allergies:   Allergies   Allergen Reactions   • Pneumococcal Vaccines Swelling     REDNESS AND SWELLING OF ARM    • Codeine Nausea And Vomiting   • Hydrocodone Nausea And Vomiting   • Sulfa Antibiotics Unknown - Low Severity       Objective     Physical Exam:  Vital Signs:   Vitals:    02/06/23 1311   BP: 104/58  "  BP Location: Left arm   Patient Position: Sitting   Pulse: 79   SpO2: 95%   Weight: 88.9 kg (196 lb)   Height: 165.1 cm (65\")     Body mass index is 32.62 kg/m².    Physical Exam  Vitals reviewed.   Constitutional:       General: She is not in acute distress.     Appearance: She is not toxic-appearing.   HENT:      Head: Normocephalic and atraumatic.   Eyes:      Extraocular Movements: Extraocular movements intact.      Conjunctiva/sclera: Conjunctivae normal.      Pupils: Pupils are equal, round, and reactive to light.   Cardiovascular:      Rate and Rhythm: Normal rate.      Heart sounds: Normal heart sounds.   Pulmonary:      Effort: Pulmonary effort is normal.      Breath sounds: Normal breath sounds.   Abdominal:      General: There is no distension.   Musculoskeletal:      Cervical back: Neck supple.      Comments: Trace BLE edema   Skin:     General: Skin is warm and dry.      Findings: No rash.   Neurological:      General: No focal deficit present.      Mental Status: She is alert and oriented to person, place, and time.   Psychiatric:         Mood and Affect: Mood normal.         Behavior: Behavior normal.       Results Review:   January 2023 chest CT scan showed no evidence of metastatic disease. Postsurgical changes. Emphysema and minor interstitial fibrosis.    Assessment / Plan      Assessment/Plan:   Diagnoses and all orders for this visit:    1. Centrilobular emphysema (HCC) (Primary)  -     Budeson-Glycopyrrol-Formoterol (Breztri Aerosphere) 160-9-4.8 MCG/ACT aerosol inhaler; Inhale 2 puffs 2 (Two) Times a Day. Rinse mouth out after use  Dispense: 1 each; Refill: 5  Good symptom control on current regimen. Continue use of Breztri as prescribed. We discussed the risk and benefits of inhaled corticosteroids. Patient instructed to take them on a regular basis as prescribed. Patient instructed to rinse their mouth out after each use. Some minor fibrotic change noted on recent chest CT scan. Continue " to monitor imaging and PFTs. May need to consider an antifibrotic in the future.    2. 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 Continue nightly use.    3. 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    Follow Up:   Return in about 6 months (around 8/6/2023) for Recheck.  The patient was counseled on diagnostic results, risks and benefits of treatment options, risk factor modifications and the importance of treatment compliance. The patient was advised to contact the clinic with concerns or worsening symptoms.     JENN Emmanuel   Pulmonary Medicine [facility]

## 2023-06-12 RX ORDER — ROSUVASTATIN CALCIUM 20 MG/1
TABLET, COATED ORAL
Qty: 90 TABLET | Refills: 0 | OUTPATIENT
Start: 2023-06-12

## 2023-06-16 ENCOUNTER — APPOINTMENT (OUTPATIENT)
Dept: CT IMAGING | Facility: HOSPITAL | Age: 83
End: 2023-06-16
Payer: MEDICARE

## 2023-06-16 ENCOUNTER — HOSPITAL ENCOUNTER (EMERGENCY)
Facility: HOSPITAL | Age: 83
Discharge: HOME | End: 2023-06-16
Attending: EMERGENCY MEDICINE
Payer: MEDICARE

## 2023-06-16 VITALS
OXYGEN SATURATION: 95 % | SYSTOLIC BLOOD PRESSURE: 155 MMHG | RESPIRATION RATE: 18 BRPM | WEIGHT: 204 LBS | TEMPERATURE: 97.6 F | HEART RATE: 68 BPM | HEIGHT: 65 IN | BODY MASS INDEX: 33.99 KG/M2 | DIASTOLIC BLOOD PRESSURE: 89 MMHG

## 2023-06-16 DIAGNOSIS — N20.0 KIDNEY STONE: Primary | ICD-10-CM

## 2023-06-16 LAB
ALBUMIN SERPL-MCNC: 3.9 G/DL (ref 3.5–5.2)
ALBUMIN/GLOB SERPL: 1.3 G/DL
ALP SERPL-CCNC: 76 U/L (ref 39–117)
ALT SERPL W P-5'-P-CCNC: 17 U/L (ref 1–33)
ANION GAP SERPL CALCULATED.3IONS-SCNC: 11.2 MMOL/L (ref 5–15)
AST SERPL-CCNC: 17 U/L (ref 1–32)
BACTERIA UR QL AUTO: ABNORMAL /HPF
BASOPHILS # BLD AUTO: 0.04 10*3/MM3 (ref 0–0.2)
BASOPHILS NFR BLD AUTO: 0.3 % (ref 0–1.5)
BILIRUB SERPL-MCNC: 0.2 MG/DL (ref 0–1.2)
BILIRUB UR QL STRIP: NEGATIVE
BUN SERPL-MCNC: 33 MG/DL (ref 8–23)
BUN/CREAT SERPL: 22.9 (ref 7–25)
CALCIUM SPEC-SCNC: 10.5 MG/DL (ref 8.6–10.5)
CHLORIDE SERPL-SCNC: 104 MMOL/L (ref 98–107)
CLARITY UR: CLEAR
CO2 SERPL-SCNC: 22.8 MMOL/L (ref 22–29)
COLOR UR: YELLOW
CREAT SERPL-MCNC: 1.44 MG/DL (ref 0.57–1)
D-LACTATE SERPL-SCNC: 1.3 MMOL/L (ref 0.5–2)
DEPRECATED RDW RBC AUTO: 45.1 FL (ref 37–54)
EGFRCR SERPLBLD CKD-EPI 2021: 36.6 ML/MIN/1.73
EOSINOPHIL # BLD AUTO: 0.26 10*3/MM3 (ref 0–0.4)
EOSINOPHIL NFR BLD AUTO: 2.2 % (ref 0.3–6.2)
ERYTHROCYTE [DISTWIDTH] IN BLOOD BY AUTOMATED COUNT: 14 % (ref 12.3–15.4)
GLOBULIN UR ELPH-MCNC: 3.1 GM/DL
GLUCOSE SERPL-MCNC: 128 MG/DL (ref 65–99)
GLUCOSE UR STRIP-MCNC: NEGATIVE MG/DL
HCT VFR BLD AUTO: 35.9 % (ref 34–46.6)
HGB BLD-MCNC: 11.7 G/DL (ref 12–15.9)
HGB UR QL STRIP.AUTO: ABNORMAL
HOLD SPECIMEN: NORMAL
HOLD SPECIMEN: NORMAL
HYALINE CASTS UR QL AUTO: ABNORMAL /LPF
IMM GRANULOCYTES # BLD AUTO: 0.04 10*3/MM3 (ref 0–0.05)
IMM GRANULOCYTES NFR BLD AUTO: 0.3 % (ref 0–0.5)
KETONES UR QL STRIP: NEGATIVE
LEUKOCYTE ESTERASE UR QL STRIP.AUTO: ABNORMAL
LIPASE SERPL-CCNC: 28 U/L (ref 13–60)
LYMPHOCYTES # BLD AUTO: 1.27 10*3/MM3 (ref 0.7–3.1)
LYMPHOCYTES NFR BLD AUTO: 10.8 % (ref 19.6–45.3)
MCH RBC QN AUTO: 28.5 PG (ref 26.6–33)
MCHC RBC AUTO-ENTMCNC: 32.6 G/DL (ref 31.5–35.7)
MCV RBC AUTO: 87.3 FL (ref 79–97)
MONOCYTES # BLD AUTO: 0.95 10*3/MM3 (ref 0.1–0.9)
MONOCYTES NFR BLD AUTO: 8 % (ref 5–12)
NEUTROPHILS NFR BLD AUTO: 78.4 % (ref 42.7–76)
NEUTROPHILS NFR BLD AUTO: 9.25 10*3/MM3 (ref 1.7–7)
NITRITE UR QL STRIP: NEGATIVE
NRBC BLD AUTO-RTO: 0 /100 WBC (ref 0–0.2)
PH UR STRIP.AUTO: 6.5 [PH] (ref 5–8)
PLATELET # BLD AUTO: 302 10*3/MM3 (ref 140–450)
PMV BLD AUTO: 9.1 FL (ref 6–12)
POTASSIUM SERPL-SCNC: 4.6 MMOL/L (ref 3.5–5.2)
PROT SERPL-MCNC: 7 G/DL (ref 6–8.5)
PROT UR QL STRIP: NEGATIVE
RBC # BLD AUTO: 4.11 10*6/MM3 (ref 3.77–5.28)
RBC # UR STRIP: ABNORMAL /HPF
REF LAB TEST METHOD: ABNORMAL
SODIUM SERPL-SCNC: 138 MMOL/L (ref 136–145)
SP GR UR STRIP: 1.01 (ref 1–1.03)
SQUAMOUS #/AREA URNS HPF: ABNORMAL /HPF
UROBILINOGEN UR QL STRIP: ABNORMAL
WBC # UR STRIP: ABNORMAL /HPF
WBC NRBC COR # BLD: 11.81 10*3/MM3 (ref 3.4–10.8)
WHOLE BLOOD HOLD COAG: NORMAL
WHOLE BLOOD HOLD SPECIMEN: NORMAL

## 2023-06-16 PROCEDURE — 25010000002 KETOROLAC TROMETHAMINE PER 15 MG: Performed by: EMERGENCY MEDICINE

## 2023-06-16 PROCEDURE — 74176 CT ABD & PELVIS W/O CONTRAST: CPT

## 2023-06-16 PROCEDURE — 25010000002 ONDANSETRON PER 1 MG: Performed by: EMERGENCY MEDICINE

## 2023-06-16 PROCEDURE — 83690 ASSAY OF LIPASE: CPT | Performed by: EMERGENCY MEDICINE

## 2023-06-16 PROCEDURE — 85025 COMPLETE CBC W/AUTO DIFF WBC: CPT

## 2023-06-16 PROCEDURE — 83605 ASSAY OF LACTIC ACID: CPT | Performed by: EMERGENCY MEDICINE

## 2023-06-16 PROCEDURE — 81001 URINALYSIS AUTO W/SCOPE: CPT | Performed by: EMERGENCY MEDICINE

## 2023-06-16 PROCEDURE — 80053 COMPREHEN METABOLIC PANEL: CPT | Performed by: EMERGENCY MEDICINE

## 2023-06-16 RX ORDER — ONDANSETRON 4 MG/1
4 TABLET, ORALLY DISINTEGRATING ORAL EVERY 8 HOURS PRN
Qty: 12 TABLET | Refills: 0 | Status: SHIPPED | OUTPATIENT
Start: 2023-06-16

## 2023-06-16 RX ORDER — SODIUM CHLORIDE 0.9 % (FLUSH) 0.9 %
10 SYRINGE (ML) INJECTION AS NEEDED
Status: DISCONTINUED | OUTPATIENT
Start: 2023-06-16 | End: 2023-06-16 | Stop reason: HOSPADM

## 2023-06-16 RX ORDER — ONDANSETRON 2 MG/ML
4 INJECTION INTRAMUSCULAR; INTRAVENOUS ONCE
Status: COMPLETED | OUTPATIENT
Start: 2023-06-16 | End: 2023-06-16

## 2023-06-16 RX ORDER — KETOROLAC TROMETHAMINE 30 MG/ML
15 INJECTION, SOLUTION INTRAMUSCULAR; INTRAVENOUS ONCE
Status: COMPLETED | OUTPATIENT
Start: 2023-06-16 | End: 2023-06-16

## 2023-06-16 RX ORDER — OXYCODONE HYDROCHLORIDE AND ACETAMINOPHEN 5; 325 MG/1; MG/1
1 TABLET ORAL EVERY 4 HOURS PRN
Qty: 8 TABLET | Refills: 0 | Status: SHIPPED | OUTPATIENT
Start: 2023-06-16

## 2023-06-16 RX ORDER — KETOROLAC TROMETHAMINE 30 MG/ML
10 INJECTION, SOLUTION INTRAMUSCULAR; INTRAVENOUS ONCE
Status: COMPLETED | OUTPATIENT
Start: 2023-06-16 | End: 2023-06-16

## 2023-06-16 RX ORDER — CEPHALEXIN 500 MG/1
500 CAPSULE ORAL 2 TIMES DAILY
Qty: 14 CAPSULE | Refills: 0 | Status: SHIPPED | OUTPATIENT
Start: 2023-06-16

## 2023-06-16 RX ADMIN — ONDANSETRON 4 MG: 2 INJECTION INTRAMUSCULAR; INTRAVENOUS at 08:13

## 2023-06-16 RX ADMIN — KETOROLAC TROMETHAMINE 10 MG: 30 INJECTION, SOLUTION INTRAMUSCULAR; INTRAVENOUS at 08:22

## 2023-06-16 RX ADMIN — ONDANSETRON 4 MG: 2 INJECTION INTRAMUSCULAR; INTRAVENOUS at 05:39

## 2023-06-16 RX ADMIN — SODIUM CHLORIDE 500 ML: 9 INJECTION, SOLUTION INTRAVENOUS at 06:21

## 2023-06-16 RX ADMIN — KETOROLAC TROMETHAMINE 15 MG: 30 INJECTION, SOLUTION INTRAMUSCULAR; INTRAVENOUS at 05:39

## 2023-06-16 NOTE — ED PROVIDER NOTES
Subjective  History of Present Illness:    Chief Complaint: Flank pain    History of Present Illness: 83-year-old female presents with right-sided flank pain associated nausea vomiting, remote history of kidney stones    Nurses Notes reviewed and agree, including vitals, allergies, social history and prior medical history.     REVIEW OF SYSTEMS: All systems reviewed and not pertinent unless noted.  Review of Systems      Positive for: Right-sided flank pain nausea vomiting    Negative for: Fever chills hematuria    Past Medical History:   Diagnosis Date    JOSH (acute kidney injury) 6/10/2022    Arthritis     GERD (gastroesophageal reflux disease)     Hypercholesteremia     Hypertension     Lung cancer 2015    LEFT LUNG LOBECTOMY     Lung cancer 2017    RIGHT     On home O2     2.5 L HS,     Pneumonia     Pneumothorax 2015    AFTER LUNG BIOPSY     Seasonal allergies     Sleep apnea     Wears dentures     Wears glasses        Allergies:    Pneumococcal vaccines, Codeine, Hydrocodone, and Sulfa antibiotics      Past Surgical History:   Procedure Laterality Date    ADRENALECTOMY Left 4/6/2018    Procedure: ADRENALECTOMY LAPAROSCOPIC LEFT;  Surgeon: Carol Ann Guzmán MD;  Location:  [facility] OR;  Service: General    BREAST BIOPSY Right     BREAST LUMPECTOMY Left     BRONCHOSCOPY N/A 11/30/2017    Procedure: BRONCHOSCOPY;  Surgeon: Jeremy Herrera MD;  Location:  [facility] OR;  Service:     CHEST TUBE INSERTION  2015    LEFT LUNG AFTER LUNG BIOPSY D/T PNEUMOTHORAX     COLONOSCOPY  2015    GOLD SEED FIDUCIAL PLACEMENT  05/21/2018    left adrenal gland    HYSTERECTOMY      LUNG BIOPSY Bilateral     LEFT IN 2015, RIGHT IN 2017     LUNG LOBECTOMY Left 08/2015    [facility] PER DR HERRERA     TEETH EXTRACTION      THORACOSCOPY VIDEO ASSISTED WITH LOBECTOMY Right 11/30/2017    Procedure: THORACOSCOPY VIDEO ASSISTED WITH RIGHT UPPER LOBE WEDGE RESECTION AND COMPLETION OF RIGHT UPPER LOBECTOMY, MEDIASTINAL LYMPH NODE DISSECTION AND INTERCOSTAL  "NERVE BLOCKS;  Surgeon: Jeremy Herrera MD;  Location: [facility];  Service:          Social History     Socioeconomic History    Marital status:      Spouse name: Hardy    Number of children: 0   Tobacco Use    Smoking status: Former     Packs/day: 0.50     Years: 35.00     Pack years: 17.50     Types: Cigarettes     Quit date: 2015     Years since quittin.8    Smokeless tobacco: Never    Tobacco comments:     smoked up to 2ppd at times   Vaping Use    Vaping Use: Never used   Substance and Sexual Activity    Alcohol use: No    Drug use: No    Sexual activity: Defer         Family History   Problem Relation Age of Onset    Hypertension Mother     Heart attack Mother     Heart attack Father     Pneumonia Brother     Leukemia Maternal Aunt        Objective  Physical Exam:  /97   Pulse 68   Temp 97.6 °F (36.4 °C) (Oral)   Resp 18   Ht 165.1 cm (65\")   Wt 92.5 kg (204 lb)   SpO2 96%   BMI 33.95 kg/m²      Physical Exam    CONSTITUTIONAL: Well developed, healthy-appearing nontoxic pleasant 83-year-old female,  in no acute distress.  VITAL SIGNS: per nursing, reviewed and noted  SKIN: exposed skin with no rashes, ulcerations or petechiae  EYES: Grossly EOMI, no icterus  ENT: Normal voice.  Moist mucous membranes   RESPIRATORY:  No increased work of breathing. No retractions.   CARDIOVASCULAR:   Extremities pink and warm.  Good cap refill to extremities.   GI: Abdomen without distention.  Localizes discomfort to right lower abdomen  MUSCULOSKELETAL: Age-appropriate bulk and tone, moves all 4 extremities  NEUROLOGIC: Alert, oriented x 3. No gross deficits. GCS 15.   PSYCH: appropriate affect.  : no bladder tenderness or distention, mild right-sided CVA tenderness    Procedures    ED Course:    Lab Results (last 24 hours)       Procedure Component Value Units Date/Time    CBC & Differential [[id number]]  (Abnormal) Collected: 23    Specimen: Blood Updated: 23    Narrative:   "    The following orders were created for panel order CBC & Differential.  Procedure                               Abnormality         Status                     ---------                               -----------         ------                     CBC Auto Differential[[id number]]        Abnormal            Final result                 Please view results for these tests on the individual orders.    Comprehensive Metabolic Panel [[id number]]  (Abnormal) Collected: 06/16/23 0527    Specimen: Blood Updated: 06/16/23 0555     Glucose 128 mg/dL      BUN 33 mg/dL      Creatinine 1.44 mg/dL      Sodium 138 mmol/L      Potassium 4.6 mmol/L      Chloride 104 mmol/L      CO2 22.8 mmol/L      Calcium 10.5 mg/dL      Total Protein 7.0 g/dL      Albumin 3.9 g/dL      ALT (SGPT) 17 U/L      AST (SGOT) 17 U/L      Alkaline Phosphatase 76 U/L      Total Bilirubin 0.2 mg/dL      Globulin 3.1 gm/dL      A/G Ratio 1.3 g/dL      BUN/Creatinine Ratio 22.9     Anion Gap 11.2 mmol/L      eGFR 36.6 mL/min/1.73     Narrative:      GFR Normal >60  Chronic Kidney Disease <60  Kidney Failure <15    The GFR formula is only valid for adults with stable renal function between ages 18 and 70.    Lipase [[id number]]  (Normal) Collected: 06/16/23 0527    Specimen: Blood Updated: 06/16/23 0555     Lipase 28 U/L     Lactic Acid, Plasma [[id number]]  (Normal) Collected: 06/16/23 0527    Specimen: Blood Updated: 06/16/23 0553     Lactate 1.3 mmol/L     CBC Auto Differential [[id number]]  (Abnormal) Collected: 06/16/23 0527    Specimen: Blood Updated: 06/16/23 0534     WBC 11.81 10*3/mm3      RBC 4.11 10*6/mm3      Hemoglobin 11.7 g/dL      Hematocrit 35.9 %      MCV 87.3 fL      MCH 28.5 pg      MCHC 32.6 g/dL      RDW 14.0 %      RDW-SD 45.1 fl      MPV 9.1 fL      Platelets 302 10*3/mm3      Neutrophil % 78.4 %      Lymphocyte % 10.8 %      Monocyte % 8.0 %      Eosinophil % 2.2 %      Basophil % 0.3 %      Immature Grans % 0.3 %      Neutrophils,  Absolute 9.25 10*3/mm3      Lymphocytes, Absolute 1.27 10*3/mm3      Monocytes, Absolute 0.95 10*3/mm3      Eosinophils, Absolute 0.26 10*3/mm3      Basophils, Absolute 0.04 10*3/mm3      Immature Grans, Absolute 0.04 10*3/mm3      nRBC 0.0 /100 WBC     Urinalysis With Microscopic If Indicated (No Culture) - Urine, Clean Catch [[id number]]  (Abnormal) Collected: 06/16/23 0605    Specimen: Urine, Clean Catch Updated: 06/16/23 0614     Color, UA Yellow     Appearance, UA Clear     pH, UA 6.5     Specific Gravity, UA 1.013     Glucose, UA Negative     Ketones, UA Negative     Bilirubin, UA Negative     Blood, UA Small (1+)     Protein, UA Negative     Leuk Esterase, UA Trace     Nitrite, UA Negative     Urobilinogen, UA 0.2 E.U./dL    Urinalysis, Microscopic Only - Urine, Clean Catch [[id number]]  (Abnormal) Collected: 06/16/23 0605    Specimen: Urine, Clean Catch Updated: 06/16/23 0634     RBC, UA 0-2 /HPF      WBC, UA 3-5 /HPF      Bacteria, UA Trace /HPF      Squamous Epithelial Cells, UA 3-6 /HPF      Hyaline Casts, UA None Seen /LPF      Methodology Manual Light Microscopy             CT Abdomen Pelvis Stone Protocol    Result Date: 6/16/2023  FINAL REPORT TECHNIQUE: null CLINICAL HISTORY: Right flank pain COMPARISON: null FINDINGS: EXAM: CT OF ABDOMEN/PELVIS WITHOUT CONTRAST: COMPARISON: None TECHNIQUE: Enteric contrast was not administered prior to exam. Contiguous axial images from lung bases through ischial tuberosities, with coronal reformatted images. FINDINGS: No pleural effusion. Interstitial septal prominence is noted in both lung bases, possibly edema. Heart size is normal. No adrenal enlargement. The left kidney is hypoplastic or atrophic. There is extensive perinephric and periureteral edema and moderate hydronephrosis and hydroureter on the right side. A stone is present in the vesicoureteral junction, measures 2 mm diameter. No stone or obstruction on the left. Gallbladder and bile ducts unremarkable.  Subcentimeter low-density lesion in segment 4A of the left lobe of the liver measures 8 mm diameter. Atherosclerosis of the aorta without aneurysm. No adenopathy or ascites is identified. Stomach and small bowel structures are unremarkable. No significant large bowel pathology is appreciated. Prior hysterectomy. Urinary bladder otherwise unremarkable. No adnexal lesion. No significant bone lesion is identified.     Impression: IMPRESSION: 1. Small right vesicoureteral junction stone with hydronephrosis and hydroureter. 2. Possible pulmonary edema. Authenticated and Electronically Signed by Mateo Young MD on 06/16/2023 07:49:55 AM      [facility]     Amount and/or Complexity of Data Reviewed  Clinical lab tests: reviewed             Medical Decision Making:    Initial impression of presenting illness: 83-year-old female presents with right flank pain nausea vomiting, began 2 AM    DDX: includes but is not limited to: Renal colic pyelonephritis,         Patient arrives hypertensive afebrile no tachycardia oxygen saturations 96% room air with vitals interpreted by myself.     Pertinent features from physical exam: Mild right lower quadrant and right CVA tenderness.    Initial diagnostic plan: Laboratory tests, CT scan, urinalysis    Results from initial plan were reviewed and interpreted by me revealing trace leukocytes in patient's urine but no significant evidence of infection.  Creatinine at 1.4.  Other labs reveal a white blood cell count of 11.  Lactic acid within the normal range.  CT scan per radiology interpretation reveals a distal small right ureteral stone with hydronephrosis.  They mention possible pulmonary edema, patient has normal lung sounds with no shortness of breath.    Diagnostic information from other sources: Family    Interventions / Re-evaluation: Patient is much more comfortable after treatment.    Results/clinical rationale were discussed with patient.    Consultations/Discussion of results  with other physicians: None    Disposition plan: Discharged with return precautions  -----      Final diagnoses:   Kidney stone            Rocio Santo MD  06/16/23 6426

## 2023-08-07 ENCOUNTER — OFFICE VISIT (OUTPATIENT)
Dept: PULMONOLOGY | Facility: CLINIC | Age: 83
End: 2023-08-07
Payer: MEDICARE

## 2023-08-07 VITALS
WEIGHT: 193 LBS | DIASTOLIC BLOOD PRESSURE: 70 MMHG | HEART RATE: 75 BPM | HEIGHT: 65 IN | OXYGEN SATURATION: 97 % | TEMPERATURE: 96.9 F | SYSTOLIC BLOOD PRESSURE: 104 MMHG | BODY MASS INDEX: 32.15 KG/M2

## 2023-08-07 DIAGNOSIS — J43.2 CENTRILOBULAR EMPHYSEMA: Primary | ICD-10-CM

## 2023-08-07 DIAGNOSIS — C34.90 PRIMARY MALIGNANT NEOPLASM OF LUNG METASTATIC TO OTHER SITE, UNSPECIFIED LATERALITY: ICD-10-CM

## 2023-08-07 DIAGNOSIS — Z99.89 OSA ON CPAP: ICD-10-CM

## 2023-08-07 DIAGNOSIS — G47.33 OSA ON CPAP: ICD-10-CM

## 2023-08-07 RX ORDER — ALBUTEROL SULFATE 2.5 MG/3ML
2.5 SOLUTION RESPIRATORY (INHALATION) EVERY 4 HOURS PRN
Qty: 540 ML | Refills: 2 | Status: SHIPPED | OUTPATIENT
Start: 2023-08-07

## 2023-08-07 RX ORDER — FLUTICASONE FUROATE, UMECLIDINIUM BROMIDE AND VILANTEROL TRIFENATATE 100; 62.5; 25 UG/1; UG/1; UG/1
1 POWDER RESPIRATORY (INHALATION)
Qty: 3 EACH | Refills: 3 | Status: SHIPPED | OUTPATIENT
Start: 2023-08-07

## 2023-08-07 RX ORDER — FLUTICASONE FUROATE, UMECLIDINIUM BROMIDE AND VILANTEROL TRIFENATATE 100; 62.5; 25 UG/1; UG/1; UG/1
POWDER RESPIRATORY (INHALATION)
COMMUNITY
Start: 2023-06-09 | End: 2023-08-07 | Stop reason: SDUPTHER

## 2023-08-07 NOTE — PROGRESS NOTES
"     Follow Up Office Visit      Patient Name: America Nix    Chief Complaint:    Chief Complaint   Patient presents with    Shortness of Breath    Follow-up       History of Present Illness: America Nix is a 83 y.o. female who is here today for follow up of emphysema and sleep apnea.  Since last visit, the patient reports that she has been doing \"pretty good\".  She notes that she has occasional coughing spells.  Denies any exacerbations.  Has been sleeping well on CPAP.  Continues to follow with oncology for history of lung cancer.     Supplemental Oxygen: 2L qhs    Subjective      Review of Systems:  Review of Systems   Constitutional:  Negative for fever and unexpected weight change.   Respiratory:  Positive for cough and shortness of breath. Negative for wheezing.    Cardiovascular:  Negative for chest pain and leg swelling.   Allergic/Immunologic: Positive for environmental allergies.      Past Medical History:   Past Medical History:   Diagnosis Date    JOSH (acute kidney injury) 6/10/2022    Arthritis     GERD (gastroesophageal reflux disease)     Hypercholesteremia     Hypertension     Lung cancer 2015    LEFT LUNG LOBECTOMY     Lung cancer 2017    RIGHT     On home O2     2.5 L HS,     Pneumonia     Pneumothorax 2015    AFTER LUNG BIOPSY     Seasonal allergies     Sleep apnea     Wears dentures     Wears glasses        Past Surgical History:   Past Surgical History:   Procedure Laterality Date    ADRENALECTOMY Left 4/6/2018    Procedure: ADRENALECTOMY LAPAROSCOPIC LEFT;  Surgeon: Carol Ann Guzmán MD;  Location:  [facility];  Service: General    BREAST BIOPSY Right     BREAST LUMPECTOMY Left     BRONCHOSCOPY N/A 11/30/2017    Procedure: BRONCHOSCOPY;  Surgeon: Jeremy Herrera MD;  Location:  [facility];  Service:     CHEST TUBE INSERTION  2015    LEFT LUNG AFTER LUNG BIOPSY D/T PNEUMOTHORAX     COLONOSCOPY  2015    GOLD SEED FIDUCIAL PLACEMENT  05/21/2018    left adrenal gland    HYSTERECTOMY      LUNG " BIOPSY Bilateral     LEFT IN , RIGHT IN 2017     LUNG LOBECTOMY Left 2015    [facility] PER DR HERRERA     TEETH EXTRACTION      THORACOSCOPY VIDEO ASSISTED WITH LOBECTOMY Right 2017    Procedure: THORACOSCOPY VIDEO ASSISTED WITH RIGHT UPPER LOBE WEDGE RESECTION AND COMPLETION OF RIGHT UPPER LOBECTOMY, MEDIASTINAL LYMPH NODE DISSECTION AND INTERCOSTAL NERVE BLOCKS;  Surgeon: Jeremy Herrera MD;  Location: [facility];  Service:        Family History:   Family History   Problem Relation Age of Onset    Hypertension Mother     Heart attack Mother     Heart attack Father     Pneumonia Brother     Leukemia Maternal Aunt        Social History:   Social History     Socioeconomic History    Marital status:      Spouse name: Hardy    Number of children: 0   Tobacco Use    Smoking status: Former     Packs/day: 0.50     Years: 35.00     Pack years: 17.50     Types: Cigarettes     Quit date: 2015     Years since quittin.0     Passive exposure: Past    Smokeless tobacco: Never    Tobacco comments:     smoked up to 2ppd at times   Vaping Use    Vaping Use: Never used   Substance and Sexual Activity    Alcohol use: No    Drug use: No    Sexual activity: Defer       Current Medications:     Current Outpatient Medications:     albuterol (PROVENTIL) (2.5 MG/3ML) 0.083% nebulizer solution, Take 2.5 mg by nebulization Every 4 (Four) Hours As Needed for Wheezing., Disp: 540 mL, Rfl: 2    aspirin 81 MG EC tablet, Take 1 tablet by mouth Daily., Disp: , Rfl:     Cholecalciferol (Vitamin D3) 50 MCG (2000 UT) capsule, Take 1 capsule by mouth Daily., Disp: , Rfl:     docusate sodium (COLACE) 50 MG capsule, Take 1 capsule by mouth Daily., Disp: , Rfl:     folic acid (FOLVITE) 1 MG tablet, Take 1 tablet by mouth Daily., Disp: , Rfl:     furosemide (LASIX) 20 MG tablet, TAKE 1 TABLET BY MOUTH ONCE DAILY AS NEEDED (LEG  SWELLING/SHORTNESS  OF  BREATH), Disp: 30 tablet, Rfl: 0    guaiFENesin (MUCINEX) 600 MG 12 hr tablet, Take 2  tablets by mouth 2 (Two) Times a Day., Disp: , Rfl:     loratadine (CLARITIN) 10 MG tablet, Take 1 tablet by mouth Daily., Disp: , Rfl:     metoprolol succinate XL (TOPROL-XL) 25 MG 24 hr tablet, Take 1 tablet by mouth Daily., Disp: 90 tablet, Rfl: 3    montelukast (SINGULAIR) 10 MG tablet, Take 1 tablet by mouth Daily As Needed (allergies)., Disp: , Rfl:     olmesartan-hydrochlorothiazide (BENICAR HCT) 40-12.5 MG per tablet, Take 1 tablet by mouth Daily. 1/2 tab in the AM and 1/2 tab in the PM, Disp: , Rfl:     omeprazole (priLOSEC) 20 MG capsule, Take 1 capsule by mouth As Needed., Disp: , Rfl:     ondansetron (ZOFRAN) 4 MG tablet, 1 tablet As Needed., Disp: , Rfl:     oxybutynin XL (DITROPAN-XL) 10 MG 24 hr tablet, Take 1 tablet by mouth Daily., Disp: , Rfl:     rosuvastatin (CRESTOR) 10 MG tablet, Take 1 tablet by mouth Daily., Disp: , Rfl:     Trelegy Ellipta 100-62.5-25 MCG/ACT inhaler, Inhale 1 puff Daily. Rinse mouth out after use, Disp: 3 each, Rfl: 3    Triamcinolone Acetonide (NASACORT AQ NA), 2 sprays into each nostril Daily., Disp: , Rfl:     Unable to find, 1 each Every Night. Med Name: C PAP MACHINE, Disp: , Rfl:     ondansetron ODT (ZOFRAN-ODT) 4 MG disintegrating tablet, Place 1 tablet on the tongue Every 8 (Eight) Hours As Needed for Nausea or Vomiting. (Patient not taking: Reported on 8/7/2023), Disp: 12 tablet, Rfl: 0    oxyCODONE-acetaminophen (PERCOCET) 5-325 MG per tablet, Take 1 tablet by mouth Every 4 (Four) Hours As Needed for Severe Pain. (Patient not taking: Reported on 8/7/2023), Disp: 8 tablet, Rfl: 0    potassium chloride 10 MEQ CR tablet, TAKE 1 TABLET BY MOUTH ONCE DAILY AS NEEDED (LEG  SWELLING/SHORTNESS  OF  BREATH) (Patient not taking: Reported on 8/7/2023), Disp: 30 tablet, Rfl: 0     Allergies:   Allergies   Allergen Reactions    Pneumococcal Vaccines Swelling     REDNESS AND SWELLING OF ARM     Codeine Nausea And Vomiting    Hydrocodone Nausea And Vomiting    Sulfa Antibiotics  "Unknown - Low Severity       Objective     Physical Exam:  Vital Signs:   Vitals:    08/07/23 1306   BP: 104/70   Pulse: 75   Temp: 96.9 øF (36.1 øC)   SpO2: 97%   Weight: 87.5 kg (193 lb)   Height: 165.1 cm (65\")     Body mass index is 32.12 kg/mý.    Physical Exam  Vitals reviewed.   Constitutional:       General: She is not in acute distress.     Appearance: She is not toxic-appearing.   HENT:      Head: Normocephalic and atraumatic.      Mouth/Throat:      Mouth: Mucous membranes are moist.   Eyes:      Extraocular Movements: Extraocular movements intact.      Conjunctiva/sclera: Conjunctivae normal.      Pupils: Pupils are equal, round, and reactive to light.   Cardiovascular:      Rate and Rhythm: Normal rate.      Heart sounds: Normal heart sounds.   Pulmonary:      Effort: Pulmonary effort is normal.      Breath sounds: Normal breath sounds.   Abdominal:      General: There is no distension.      Palpations: Abdomen is soft.   Musculoskeletal:         General: No swelling.      Cervical back: Neck supple.   Skin:     General: Skin is warm and dry.      Findings: No rash.   Neurological:      General: No focal deficit present.      Mental Status: She is alert and oriented to person, place, and time.   Psychiatric:         Mood and Affect: Mood normal.         Behavior: Behavior normal.     Assessment / Plan      Assessment/Plan:   Diagnoses and all orders for this visit:    1. Centrilobular emphysema (Primary)  -     Trelegy Ellipta 100-62.5-25 MCG/ACT inhaler; Inhale 1 puff Daily. Rinse mouth out after use  Dispense: 3 each; Refill: 3  -     albuterol (PROVENTIL) (2.5 MG/3ML) 0.083% nebulizer solution; Take 2.5 mg by nebulization Every 4 (Four) Hours As Needed for Wheezing.  Dispense: 540 mL; Refill: 2  Doing well on current inhaled regimen. We discussed the risk and benefits of inhaled corticosteroids. Patient instructed to take them on a regular basis as prescribed. Patient instructed to rinse their mouth " out after each use.     2. VINNIE on CPAP  Doing well on CPAP nightly.  Ongoing compliance advised.    3. Primary malignant neoplasm of lung metastatic to other site, unspecified laterality  Continues to follow with oncology for surveillance.     Follow Up:   Return in about 6 months (around 2/7/2024) for Recheck.  The patient was counseled on diagnostic results, risks and benefits of treatment options, risk factor modifications and the importance of treatment compliance. The patient was advised to contact the clinic with concerns or worsening symptoms.     JENN Emmanuel   Pulmonary Medicine [place]

## 2023-09-27 ENCOUNTER — TRANSCRIBE ORDERS (OUTPATIENT)
Dept: GENERAL RADIOLOGY | Facility: HOSPITAL | Age: 83
End: 2023-09-27
Payer: MEDICARE

## 2023-09-27 ENCOUNTER — HOSPITAL ENCOUNTER (OUTPATIENT)
Dept: GENERAL RADIOLOGY | Facility: HOSPITAL | Age: 83
Discharge: HOME | End: 2023-09-27
Payer: MEDICARE

## 2023-09-27 DIAGNOSIS — R09.1 PLEURISY: ICD-10-CM

## 2023-09-27 DIAGNOSIS — R06.02 SHORTNESS OF BREATH: ICD-10-CM

## 2023-09-27 DIAGNOSIS — R05.1 ACUTE COUGH: Primary | ICD-10-CM

## 2023-09-27 PROCEDURE — 71046 X-RAY EXAM CHEST 2 VIEWS: CPT

## 2023-10-09 ENCOUNTER — TRANSCRIBE ORDERS (OUTPATIENT)
Dept: ADMINISTRATIVE | Facility: HOSPITAL | Age: 83
End: 2023-10-09
Payer: MEDICARE

## 2023-10-09 DIAGNOSIS — R06.02 SHORTNESS OF BREATH: ICD-10-CM

## 2023-10-09 DIAGNOSIS — R05.1 ACUTE COUGH: Primary | ICD-10-CM

## 2023-10-16 ENCOUNTER — HOSPITAL ENCOUNTER (OUTPATIENT)
Dept: CT IMAGING | Facility: HOSPITAL | Age: 83
Discharge: HOME | End: 2023-10-16
Admitting: FAMILY MEDICINE
Payer: MEDICARE

## 2023-10-16 DIAGNOSIS — R06.02 SHORTNESS OF BREATH: ICD-10-CM

## 2023-10-16 DIAGNOSIS — R05.1 ACUTE COUGH: ICD-10-CM

## 2023-10-16 LAB — CREAT BLDA-MCNC: 1.1 MG/DL (ref 0.6–1.3)

## 2023-10-16 PROCEDURE — 25510000001 IOPAMIDOL 61 % SOLUTION: Performed by: FAMILY MEDICINE

## 2023-10-16 PROCEDURE — 71270 CT THORAX DX C-/C+: CPT

## 2023-10-16 PROCEDURE — 82565 ASSAY OF CREATININE: CPT

## 2023-10-16 RX ADMIN — IOPAMIDOL 85 ML: 612 INJECTION, SOLUTION INTRAVENOUS at 08:44

## 2024-01-29 ENCOUNTER — HOSPITAL ENCOUNTER (OUTPATIENT)
Dept: CT IMAGING | Facility: HOSPITAL | Age: 84
Discharge: HOME | End: 2024-01-29
Admitting: INTERNAL MEDICINE
Payer: MEDICARE

## 2024-01-29 DIAGNOSIS — C79.72 MALIGNANT NEOPLASM METASTATIC TO LEFT ADRENAL GLAND: ICD-10-CM

## 2024-01-29 PROCEDURE — 71260 CT THORAX DX C+: CPT

## 2024-01-29 PROCEDURE — 25510000001 IOPAMIDOL 61 % SOLUTION: Performed by: INTERNAL MEDICINE

## 2024-01-29 RX ADMIN — IOPAMIDOL 100 ML: 612 INJECTION, SOLUTION INTRAVENOUS at 11:28

## 2024-01-30 NOTE — PROGRESS NOTES
DATE OF VISIT: 1/31/2024    REASON FOR VISIT: Followup for bilateral lung cancers     PROBLEM LIST:  1. 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 Diagnosed 08/03/2015 after CT-guided biopsy.  B. Status post left upper lobe resection with mediastinal lymph node sampling  done by Dr. Herrera on 08/26/2015.  C. New right upper lobe lung nodule with left adrenal nodule, both were hypermetabolic active on PET scan done on November 6, 2017  2.  Stage IB right upper lobe adenocarcinoma [id number]:  A. presented with hypermetabolic nodule on a screening PET scan.  B.  Status post surgical resection with a clean margins done by Dr. Herrera November 30 2017  C.  Final pathology revealed 1.7 adenocarcinoma with pleural involvement negative hilar and mediastinal nodes and clear surgical margins  3.  Isolated left adrenal metastases:  A.  Status post left adrenalectomy done on February 1, 2018  B. Pathology report revealed metastatic adenocarcinoma lung primary with positive margin  C. Status post CyberKnife radiation treatment completed Traci 15, 2018  4.  Hypertension  5.  Hypercholesterolemia  6.  Heartburn    HISTORY OF PRESENT ILLNESS: The patient is a very pleasant 84 y.o. female with past medical history significant for bilateral lung cancers diagnosed 2015 left side and 2017 right side.  The patient had isolated relapse in the adrenal gland and she is status post resection followed by CyberKnife radiotherapy completed Traci 15, 2018. The patient is here today for scheduled follow-up visit.    SUBJECTIVE: The patient is here today by herself.  She is doing fairly well she is staying active denies any nausea or vomiting no night sweats.  She is anxious about the scan results.    Past History:  Medical History: has a past medical history of JOSH (acute kidney injury) (6/10/2022), Arthritis, GERD (gastroesophageal reflux disease), Hypercholesteremia, Hypertension, Lung cancer (2015), Lung cancer (2017),  "On home O2, Pneumonia, Pneumothorax (2015), Seasonal allergies, Sleep apnea, Wears dentures, and Wears glasses.   Surgical History: has a past surgical history that includes Hysterectomy; Lung lobectomy (Left, 08/2015); Lung biopsy (Bilateral); Breast lumpectomy (Left); Breast biopsy (Right); Chest tube insertion (2015); Colonoscopy (2015); Bronchoscopy (N/A, 11/30/2017); thoracoscopy video assisted with lobectomy (Right, 11/30/2017); Teeth Extraction; Adrenalectomy (Left, 4/6/2018); and Gold Seed Fiducial Placement (05/21/2018).   Family History: family history includes Heart attack in her father and mother; Hypertension in her mother; Leukemia in her maternal aunt; Pneumonia in her brother.   Social History: reports that she quit smoking about 8 years ago. Her smoking use included cigarettes. She has a 17.50 pack-year smoking history. She has been exposed to tobacco smoke. She has never used smokeless tobacco. She reports that she does not drink alcohol and does not use drugs.    (Not in a hospital admission)     Allergies: Pneumococcal vaccines, Codeine, Hydrocodone, and Sulfa antibiotics     Review of Systems   All other systems reviewed and are negative.      PHYSICAL EXAMINATION:   /61   Pulse 84   Temp 97.5 °F (36.4 °C)   Resp 16   Ht 165.1 cm (65\")   Wt 95.7 kg (211 lb)   SpO2 95%   BMI 35.11 kg/m²    Pain Score    01/31/24 1044   PainSc: 0-No pain                       ECOG Performance Status: 1 - Symptomatic but completely ambulatory      General Appearance:      alert, cooperative, no apparent distress and appears stated age   Lungs:   Clear to auscultation bilaterally; respirations regular, even, and unlabored bilaterally   Heart:  Regular rate and rhythm, no murmurs appreciated   Abdomen:   Soft, non-tender, and non-distended                 No visits with results within 2 Week(s) from this visit.   Latest known visit with results is:   Hospital Outpatient Visit on 10/16/2023   Component " Date Value Ref Range Status    Creatinine 10/16/2023 1.10  0.60 - 1.30 mg/dL Final    Serial Number: [id number]:  [id number]        CT Chest With Contrast Diagnostic    Result Date: 1/29/2024  Narrative: PROCEDURE: CT CHEST W CONTRAST DIAGNOSTIC-  HISTORY: f/u scans; C79.72-Secondary malignant neoplasm of left adrenal gland, bilateral lung cancer.  COMPARISON: October 16, 2023  PROCEDURE: The patient was injected with IV contrast.  Axial images were obtained from the lung apex to the mid abdomen by computed tomography. This study was performed with techniques to keep radiation doses as low as reasonably achievable, (ALARA). Individualized dose reduction techniques using automated exposure control or adjustment of mA and/or kV according to the patient size were employed.  FINDINGS:  CHEST: There is no suspicious axillary adenopathy. There is no suspicious hilar or mediastinal adenopathy.  The heart is proper size. Mitral annulus calcifications noted. There is no pericardial or pleural effusion.  No suspicious infiltrate or nodule identified. Postsurgical change again noted in both hemithoraces, stable. Limited images of the upper abdomen demonstrate fiducial markers in the region of the left adrenal gland. Fatty infiltration of the liver and hypodense lesion in the left lobe are stable. The previously described groundglass opacities bilaterally have almost completely resolved. No bony destructive lesion seen.      Impression: Almost complete interval resolution of bilateral groundglass opacities, likely infectious/inflammatory.  Stable postsurgical change.  CTDI: 5.96 mGy DLP:181.54 mGy.cm  This report was signed and finalized on 1/29/2024 1:18 PM by Shannon Jennings MD.         ASSESSMENT: The patient is a very pleasant 84 y.o. female  with bilateral lung cancers      PLAN:    1.  Bilateral upper lobes non-small cell lung cancer:  A.  I did go over CT chest abdomen and pelvis results with the patient.  There is no  evidence of relapsed disease.  There is almost complete resolution of bilateral groundglass opacities.  This is likely infectious or inflammatory.    B.  I will continue annual screening with scan in 1 year.  C.  No recent labs.  I will order CBC and CMP for next month when she follows up with PCP.    2.  Hypertension:  A.  The patient will continue metoprolol as well as Benicar HCTZ    3. Hypercholesteremia:  A. She will continue Crestor 10 mg daily.    FOLLOW UP: 1 year with CT scan.     Bonita Mejia, APRN  1/31/2024

## 2024-01-31 ENCOUNTER — OFFICE VISIT (OUTPATIENT)
Dept: ONCOLOGY | Facility: CLINIC | Age: 84
End: 2024-01-31
Payer: MEDICARE

## 2024-01-31 VITALS
DIASTOLIC BLOOD PRESSURE: 61 MMHG | TEMPERATURE: 97.5 F | HEART RATE: 84 BPM | HEIGHT: 65 IN | WEIGHT: 211 LBS | RESPIRATION RATE: 16 BRPM | BODY MASS INDEX: 35.16 KG/M2 | SYSTOLIC BLOOD PRESSURE: 135 MMHG | OXYGEN SATURATION: 95 %

## 2024-01-31 DIAGNOSIS — C79.72 MALIGNANT NEOPLASM METASTATIC TO LEFT ADRENAL GLAND: Primary | ICD-10-CM

## 2024-01-31 DIAGNOSIS — C34.12 CANCER OF UPPER LOBE OF LEFT LUNG: ICD-10-CM

## 2024-01-31 PROCEDURE — 1159F MED LIST DOCD IN RCRD: CPT | Performed by: NURSE PRACTITIONER

## 2024-01-31 PROCEDURE — 3075F SYST BP GE 130 - 139MM HG: CPT | Performed by: NURSE PRACTITIONER

## 2024-01-31 PROCEDURE — 3078F DIAST BP <80 MM HG: CPT | Performed by: NURSE PRACTITIONER

## 2024-01-31 PROCEDURE — 99214 OFFICE O/P EST MOD 30 MIN: CPT | Performed by: NURSE PRACTITIONER

## 2024-01-31 PROCEDURE — 1126F AMNT PAIN NOTED NONE PRSNT: CPT | Performed by: NURSE PRACTITIONER

## 2024-01-31 PROCEDURE — 1160F RVW MEDS BY RX/DR IN RCRD: CPT | Performed by: NURSE PRACTITIONER

## 2024-01-31 RX ORDER — HYDROCODONE POLISTIREX AND CHLORPHENIRAMINE POLISTIREX 10; 8 MG/5ML; MG/5ML
SUSPENSION, EXTENDED RELEASE ORAL
COMMUNITY

## 2024-01-31 RX ORDER — PREDNISONE 10 MG/1
TABLET ORAL
COMMUNITY
Start: 2024-01-23

## 2024-01-31 RX ORDER — OLMESARTAN MEDOXOMIL / AMLODIPINE BESYLATE / HYDROCHLOROTHIAZIDE 40; 10; 12.5 MG/1; MG/1; MG/1
TABLET, FILM COATED ORAL
COMMUNITY

## 2024-01-31 RX ORDER — AMOXICILLIN AND CLAVULANATE POTASSIUM 875; 125 MG/1; MG/1
1 TABLET, FILM COATED ORAL EVERY 12 HOURS SCHEDULED
COMMUNITY
Start: 2024-01-23

## 2024-02-05 DIAGNOSIS — J43.2 CENTRILOBULAR EMPHYSEMA: ICD-10-CM

## 2024-02-05 RX ORDER — ALBUTEROL SULFATE 2.5 MG/3ML
SOLUTION RESPIRATORY (INHALATION) EVERY 4 HOURS PRN
Qty: 540 ML | Refills: 0 | Status: SHIPPED | OUTPATIENT
Start: 2024-02-05

## 2024-02-12 ENCOUNTER — OFFICE VISIT (OUTPATIENT)
Dept: PULMONOLOGY | Facility: CLINIC | Age: 84
End: 2024-02-12
Payer: MEDICARE

## 2024-02-12 VITALS
OXYGEN SATURATION: 96 % | WEIGHT: 214 LBS | HEIGHT: 65 IN | DIASTOLIC BLOOD PRESSURE: 70 MMHG | BODY MASS INDEX: 35.65 KG/M2 | HEART RATE: 87 BPM | SYSTOLIC BLOOD PRESSURE: 136 MMHG

## 2024-02-12 DIAGNOSIS — J43.2 CENTRILOBULAR EMPHYSEMA: Primary | ICD-10-CM

## 2024-02-12 DIAGNOSIS — Z87.891 PERSONAL HISTORY OF NICOTINE DEPENDENCE: ICD-10-CM

## 2024-02-12 DIAGNOSIS — Z85.118 HISTORY OF LUNG CANCER: ICD-10-CM

## 2024-02-12 DIAGNOSIS — G47.33 OSA ON CPAP: ICD-10-CM

## 2024-02-12 PROCEDURE — 99214 OFFICE O/P EST MOD 30 MIN: CPT | Performed by: NURSE PRACTITIONER

## 2024-02-12 PROCEDURE — 3078F DIAST BP <80 MM HG: CPT | Performed by: NURSE PRACTITIONER

## 2024-02-12 PROCEDURE — 3075F SYST BP GE 130 - 139MM HG: CPT | Performed by: NURSE PRACTITIONER

## 2024-07-12 ENCOUNTER — HOSPITAL ENCOUNTER (OUTPATIENT)
Dept: ULTRASOUND IMAGING | Facility: HOSPITAL | Age: 84
Discharge: HOME | End: 2024-07-12
Payer: MEDICARE

## 2024-07-12 ENCOUNTER — TRANSCRIBE ORDERS (OUTPATIENT)
Dept: ULTRASOUND IMAGING | Facility: HOSPITAL | Age: 84
End: 2024-07-12
Payer: MEDICARE

## 2024-07-12 DIAGNOSIS — L53.9 REDNESS: ICD-10-CM

## 2024-07-12 DIAGNOSIS — M79.604 BILATERAL LEG PAIN: ICD-10-CM

## 2024-07-12 DIAGNOSIS — M79.604 BILATERAL LEG PAIN: Primary | ICD-10-CM

## 2024-07-12 DIAGNOSIS — M79.605 BILATERAL LEG PAIN: Primary | ICD-10-CM

## 2024-07-12 DIAGNOSIS — M79.605 BILATERAL LEG PAIN: ICD-10-CM

## 2024-07-12 PROCEDURE — 93970 EXTREMITY STUDY: CPT

## 2024-08-12 ENCOUNTER — TRANSCRIBE ORDERS (OUTPATIENT)
Dept: GENERAL RADIOLOGY | Facility: HOSPITAL | Age: 84
End: 2024-08-12
Payer: MEDICARE

## 2024-08-12 ENCOUNTER — HOSPITAL ENCOUNTER (OUTPATIENT)
Dept: GENERAL RADIOLOGY | Facility: HOSPITAL | Age: 84
Discharge: HOME | End: 2024-08-12
Admitting: FAMILY MEDICINE
Payer: MEDICARE

## 2024-08-12 ENCOUNTER — OFFICE VISIT (OUTPATIENT)
Dept: PULMONOLOGY | Facility: CLINIC | Age: 84
End: 2024-08-12
Payer: MEDICARE

## 2024-08-12 VITALS
RESPIRATION RATE: 18 BRPM | OXYGEN SATURATION: 93 % | DIASTOLIC BLOOD PRESSURE: 70 MMHG | SYSTOLIC BLOOD PRESSURE: 126 MMHG | HEART RATE: 85 BPM | BODY MASS INDEX: 33.32 KG/M2 | HEIGHT: 65 IN | WEIGHT: 200 LBS

## 2024-08-12 DIAGNOSIS — G47.33 OSA ON CPAP: ICD-10-CM

## 2024-08-12 DIAGNOSIS — J43.9 ACUTE EXACERBATION OF EMPHYSEMA: ICD-10-CM

## 2024-08-12 DIAGNOSIS — Z85.118 HISTORY OF LUNG CANCER: ICD-10-CM

## 2024-08-12 DIAGNOSIS — R05.9 COUGH, UNSPECIFIED TYPE: Primary | ICD-10-CM

## 2024-08-12 DIAGNOSIS — J43.2 CENTRILOBULAR EMPHYSEMA: Primary | ICD-10-CM

## 2024-08-12 PROCEDURE — 3078F DIAST BP <80 MM HG: CPT | Performed by: NURSE PRACTITIONER

## 2024-08-12 PROCEDURE — 71046 X-RAY EXAM CHEST 2 VIEWS: CPT

## 2024-08-12 PROCEDURE — 3074F SYST BP LT 130 MM HG: CPT | Performed by: NURSE PRACTITIONER

## 2024-08-12 PROCEDURE — 99214 OFFICE O/P EST MOD 30 MIN: CPT | Performed by: NURSE PRACTITIONER

## 2024-08-12 RX ORDER — TELMISARTAN 20 MG/1
20 TABLET ORAL DAILY
COMMUNITY
Start: 2024-07-29

## 2024-08-12 NOTE — PROGRESS NOTES
Follow Up Office Visit      Patient Name: America Nix    Chief Complaint:    Chief Complaint   Patient presents with    Breathing Problem    Follow-up       History of Present Illness: America Nix is a 84 y.o. female who is here today for follow up of emphysema. She reports that she developed some chest congestion last week and her PCP prescribed her 5 days of azithromycin.  She followed up with her PCP again today and reports being given an antibiotic shot, no denies taking any steroids.  She has another antibiotic waiting for her to her pharmacy, though she is not sure of the name of the medication.  She is producing small amounts of some light yellow phlegm.  No current wheezing.  Shortness of breath does not appear to be increased from baseline.  She reports compliance with Trelegy.  She also reports nightly compliance with her CPAP.  Repeat chest CT scan pending for February 2025 as ordered by oncology.  She has had her rsv vaccine already.  No fevers, no hemoptysis, no unintended weight loss.    Supplemental Oxygen: 2L qhs    Subjective      Review of Systems:  Review of Systems   Constitutional:  Negative for fever and unexpected weight change.   Respiratory:  Positive for cough and shortness of breath. Negative for wheezing.    Cardiovascular:  Negative for chest pain and leg swelling.        Past Medical History:   Past Medical History:   Diagnosis Date    JOSH (acute kidney injury) 6/10/2022    Arthritis     GERD (gastroesophageal reflux disease)     Hypercholesteremia     Hypertension     Lung cancer 2015    LEFT LUNG LOBECTOMY     Lung cancer 2017    RIGHT     On home O2     2.5 L HS,     Pneumonia     Pneumothorax 2015    AFTER LUNG BIOPSY     Seasonal allergies     Sleep apnea     Wears dentures     Wears glasses        Past Surgical History:   Past Surgical History:   Procedure Laterality Date    ADRENALECTOMY Left 4/6/2018    Procedure: ADRENALECTOMY LAPAROSCOPIC LEFT;  Surgeon:  Carol Ann Guzmán MD;  Location:  [facility];  Service: General    BREAST BIOPSY Right     BREAST LUMPECTOMY Left     BRONCHOSCOPY N/A 2017    Procedure: BRONCHOSCOPY;  Surgeon: Jeremy Herrera MD;  Location:  [facility];  Service:     CHEST TUBE INSERTION      LEFT LUNG AFTER LUNG BIOPSY D/T PNEUMOTHORAX     COLONOSCOPY      GOLD SEED FIDUCIAL PLACEMENT  2018    left adrenal gland    HYSTERECTOMY      LUNG BIOPSY Bilateral     LEFT IN , RIGHT IN      LUNG LOBECTOMY Left 2015    [facility]LE[facility] PER DR HERRERA     TEETH EXTRACTION      THORACOSCOPY VIDEO ASSISTED WITH LOBECTOMY Right 2017    Procedure: THORACOSCOPY VIDEO ASSISTED WITH RIGHT UPPER LOBE WEDGE RESECTION AND COMPLETION OF RIGHT UPPER LOBECTOMY, MEDIASTINAL LYMPH NODE DISSECTION AND INTERCOSTAL NERVE BLOCKS;  Surgeon: Jeremy Herrera MD;  Location:  [facility];  Service:        Family History:   Family History   Problem Relation Age of Onset    Hypertension Mother     Heart attack Mother     Heart attack Father     Pneumonia Brother     Leukemia Maternal Aunt        Social History:   Social History     Socioeconomic History    Marital status:      Spouse name: Hardy    Number of children: 0   Tobacco Use    Smoking status: Former     Current packs/day: 0.00     Average packs/day: 0.5 packs/day for 35.0 years (17.5 ttl pk-yrs)     Types: Cigarettes     Start date: 1980     Quit date: 2015     Years since quittin.0     Passive exposure: Past    Smokeless tobacco: Never    Tobacco comments:     smoked up to 2ppd at times   Vaping Use    Vaping status: Never Used   Substance and Sexual Activity    Alcohol use: No    Drug use: No    Sexual activity: Defer       Current Medications:     Current Outpatient Medications:     telmisartan (MICARDIS) 20 MG tablet, Take 1 tablet by mouth Daily., Disp: , Rfl:     albuterol (PROVENTIL) (2.5 MG/3ML) 0.083% nebulizer solution, USE 1 VIAL IN NEBULIZER EVERY 4 HOURS AS NEEDED FOR WHEEZING,  Disp: 540 mL, Rfl: 0    Ascorbic Acid (VITAMIN C ER PO), Take  by mouth. 2000 units po, Disp: , Rfl:     aspirin 81 MG EC tablet, Take 1 tablet by mouth Daily., Disp: , Rfl:     Cholecalciferol (Vitamin D3) 50 MCG (2000 UT) capsule, Take 1 capsule by mouth Daily., Disp: , Rfl:     docusate sodium (COLACE) 50 MG capsule, Take 1 capsule by mouth Daily., Disp: , Rfl:     ELDERBERRY PO, Take  by mouth., Disp: , Rfl:     folic acid (FOLVITE) 1 MG tablet, Take 1 tablet by mouth Daily., Disp: , Rfl:     furosemide (LASIX) 20 MG tablet, TAKE 1 TABLET BY MOUTH ONCE DAILY AS NEEDED (LEG  SWELLING/SHORTNESS  OF  BREATH), Disp: 30 tablet, Rfl: 0    guaiFENesin (MUCINEX) 600 MG 12 hr tablet, Take 2 tablets by mouth 2 (Two) Times a Day., Disp: , Rfl:     Hydrocod Pascual-Chlorphe Pascual ER (TUSSIONEX PENNKINETIC) 10-8 MG/5ML ER suspension, TAKE 1 TEASPOONFUL (5 ML) BY MOUTH EVERY TWELVE HOURS AS NEEDED FOR COUGH, Disp: , Rfl:     loratadine (CLARITIN) 10 MG tablet, Take 1 tablet by mouth Daily., Disp: , Rfl:     metoprolol succinate XL (TOPROL-XL) 25 MG 24 hr tablet, Take 1 tablet by mouth Daily., Disp: 90 tablet, Rfl: 3    montelukast (SINGULAIR) 10 MG tablet, Take 1 tablet by mouth Daily As Needed (allergies)., Disp: , Rfl:     Olmesartan-amLODIPine-HCTZ 40-10-12.5 MG tablet, Take 1 tablet every day by oral route., Disp: , Rfl:     omeprazole (priLOSEC) 20 MG capsule, Take 1 capsule by mouth As Needed., Disp: , Rfl:     ondansetron ODT (ZOFRAN-ODT) 4 MG disintegrating tablet, Place 1 tablet on the tongue Every 8 (Eight) Hours As Needed for Nausea or Vomiting., Disp: 12 tablet, Rfl: 0    predniSONE (DELTASONE) 10 MG tablet, Once per day, Disp: , Rfl:     rosuvastatin (CRESTOR) 10 MG tablet, Take 1 tablet by mouth Daily., Disp: , Rfl:     Trelegy Ellipta 100-62.5-25 MCG/ACT inhaler, Inhale 1 puff Daily. Rinse mouth out after use, Disp: 3 each, Rfl: 3    Triamcinolone Acetonide (NASACORT AQ NA), 2 sprays into each nostril Daily., Disp:  ", Rfl:     Unable to find, 1 each Every Night. Med Name: C PAP MACHINE, Disp: , Rfl:      Allergies:   Allergies   Allergen Reactions    Pneumococcal Vaccines Swelling     REDNESS AND SWELLING OF ARM     Codeine Nausea And Vomiting    Hydrocodone Nausea And Vomiting    Sulfa Antibiotics Unknown - Low Severity       Objective     Physical Exam:  Vital Signs:   Vitals:    08/12/24 1304   BP: 126/70   Pulse: 85   Resp: 18   SpO2: 93%   Weight: 90.7 kg (200 lb)   Height: 165.1 cm (65\")     Body mass index is 33.28 kg/m².    Physical Exam  Vitals reviewed.   Constitutional:       General: She is not in acute distress.     Appearance: She is not toxic-appearing.   HENT:      Head: Normocephalic and atraumatic.      Mouth/Throat:      Mouth: Mucous membranes are moist.   Eyes:      Conjunctiva/sclera: Conjunctivae normal.   Cardiovascular:      Rate and Rhythm: Normal rate.      Heart sounds: Normal heart sounds.   Pulmonary:      Effort: Pulmonary effort is normal.      Breath sounds: Normal breath sounds.   Abdominal:      General: There is no distension.      Palpations: Abdomen is soft.   Musculoskeletal:         General: No swelling.      Cervical back: Neck supple.   Skin:     General: Skin is warm and dry.      Findings: No rash.   Neurological:      General: No focal deficit present.      Mental Status: She is alert and oriented to person, place, and time.   Psychiatric:         Mood and Affect: Mood normal.         Behavior: Behavior normal.       Assessment / Plan      Assessment/Plan:   Diagnoses and all orders for this visit:    1. Centrilobular emphysema (Primary)  Continue current inhaled regimen. We discussed the risk and benefits of inhaled corticosteroids. Patient instructed to take them on a regular basis as prescribed. Patient instructed to rinse their mouth out after each use.     2. Acute exacerbation of emphysema  Mild exacerbation symptoms currently.  Antibiotics have been prescribed via her PCP.  " She has a PRN course of prednisone at home which she was instructed to go ahead and begin taking.  Chest x-ray performed earlier today as ordered by her PCP, results pending.    3. VINNIE on CPAP  Continue on CPAP nightly.    4. History of lung cancer  Following with oncology for surveillance imaging.      Follow Up:   Return in about 6 months (around 2/12/2025) for Recheck.  The patient was counseled on diagnostic results, risks and benefits of treatment options, risk factor modifications and the importance of treatment compliance. The patient was advised to contact the clinic with concerns or worsening symptoms.     JENN Emmanuel   Pulmonary Medicine [place]     This document has been electronically signed by JENN Emmanuel  August 12, 2024

## 2025-01-22 ENCOUNTER — HOSPITAL ENCOUNTER (OUTPATIENT)
Dept: CT IMAGING | Facility: HOSPITAL | Age: 85
Discharge: HOME | End: 2025-01-22
Admitting: NURSE PRACTITIONER
Payer: MEDICARE

## 2025-01-22 DIAGNOSIS — C79.72 MALIGNANT NEOPLASM METASTATIC TO LEFT ADRENAL GLAND: ICD-10-CM

## 2025-01-22 DIAGNOSIS — C34.12 CANCER OF UPPER LOBE OF LEFT LUNG: ICD-10-CM

## 2025-01-22 LAB — CREAT BLDA-MCNC: 1.2 MG/DL (ref 0.6–1.3)

## 2025-01-22 PROCEDURE — 71270 CT THORAX DX C-/C+: CPT

## 2025-01-22 PROCEDURE — 82565 ASSAY OF CREATININE: CPT

## 2025-01-22 PROCEDURE — 25510000001 IOPAMIDOL 61 % SOLUTION: Performed by: NURSE PRACTITIONER

## 2025-01-22 RX ORDER — IOPAMIDOL 612 MG/ML
100 INJECTION, SOLUTION INTRAVASCULAR
Status: COMPLETED | OUTPATIENT
Start: 2025-01-22 | End: 2025-01-22

## 2025-01-22 RX ADMIN — IOPAMIDOL 90 ML: 612 INJECTION, SOLUTION INTRAVENOUS at 14:55

## 2025-01-29 ENCOUNTER — OFFICE VISIT (OUTPATIENT)
Dept: ONCOLOGY | Facility: CLINIC | Age: 85
End: 2025-01-29
Payer: MEDICARE

## 2025-01-29 VITALS
HEIGHT: 65 IN | TEMPERATURE: 97.5 F | OXYGEN SATURATION: 96 % | WEIGHT: 211.9 LBS | SYSTOLIC BLOOD PRESSURE: 191 MMHG | RESPIRATION RATE: 16 BRPM | HEART RATE: 83 BPM | BODY MASS INDEX: 35.31 KG/M2 | DIASTOLIC BLOOD PRESSURE: 79 MMHG

## 2025-01-29 DIAGNOSIS — C34.90 PRIMARY MALIGNANT NEOPLASM OF LUNG METASTATIC TO OTHER SITE, UNSPECIFIED LATERALITY: Primary | ICD-10-CM

## 2025-01-29 PROCEDURE — 99214 OFFICE O/P EST MOD 30 MIN: CPT | Performed by: INTERNAL MEDICINE

## 2025-01-29 PROCEDURE — 1126F AMNT PAIN NOTED NONE PRSNT: CPT | Performed by: INTERNAL MEDICINE

## 2025-01-29 PROCEDURE — 3078F DIAST BP <80 MM HG: CPT | Performed by: INTERNAL MEDICINE

## 2025-01-29 PROCEDURE — 3077F SYST BP >= 140 MM HG: CPT | Performed by: INTERNAL MEDICINE

## 2025-01-29 RX ORDER — TELMISARTAN AND HYDROCHLORTHIAZIDE 40; 12.5 MG/1; MG/1
1 TABLET ORAL DAILY
COMMUNITY
Start: 2024-12-12

## 2025-01-29 NOTE — PROGRESS NOTES
DATE OF VISIT: 1/29/2025    REASON FOR VISIT: Followup for bilateral lung cancers     PROBLEM LIST:  1. Stage IB poorly differentiated adenocarcinoma of the lung, [id number]:  A. Diagnosed 08/03/2015 after CT-guided biopsy.  B. Status post left upper lobe resection with mediastinal lymph node sampling  done by Dr. Herrera on 08/26/2015.  C. New right upper lobe lung nodule with left adrenal nodule, both were hypermetabolic active on PET scan done on November 6, 2017  2.  Stage IB right upper lobe adenocarcinoma [id number]:  A. presented with hypermetabolic nodule on a screening PET scan.  B.  Status post surgical resection with a clean margins done by Dr. Herrera November 30 2017  C.  Final pathology revealed 1.7 adenocarcinoma with pleural involvement negative hilar and mediastinal nodes and clear surgical margins  3.  Isolated left adrenal metastases:  A.  Status post left adrenalectomy done on February 1, 2018  B. Pathology report revealed metastatic adenocarcinoma lung primary with positive margin  C. Status post CyberKnife radiation treatment completed Traci 15, 2018  4.  Hypertension  5.  Hypercholesterolemia  6.  Heartburn    HISTORY OF PRESENT ILLNESS: The patient is a very pleasant 85 y.o. female with past medical history significant for bilateral lung cancers diagnosed 2015 left side and 2017 right side.  The patient had isolated relapse in the adrenal gland and she is status post resection followed by CyberKnife radiotherapy completed Traci 15, 2018. The patient is here today for scheduled follow-up visit.    SUBJECTIVE: America is here today with her friend.  All in all she is doing well.  Denies any fever chills night sweats.    Past History:  Medical History: has a past medical history of JOSH (acute kidney injury) (6/10/2022), Arthritis, GERD (gastroesophageal reflux disease), Hypercholesteremia, Hypertension, Lung cancer (2015), Lung cancer (2017), On home O2, Pneumonia, Pneumothorax (2015), Seasonal allergies,  Sleep apnea, Wears dentures, and Wears glasses.   Surgical History: has a past surgical history that includes Hysterectomy; Lung lobectomy (Left, 08/2015); Lung biopsy (Bilateral); Breast lumpectomy (Left); Breast biopsy (Right); Chest tube insertion (2015); Colonoscopy (2015); Bronchoscopy (N/A, 11/30/2017); thoracoscopy video assisted with lobectomy (Right, 11/30/2017); Teeth Extraction; Adrenalectomy (Left, 4/6/2018); and Gold Seed Fiducial Placement (05/21/2018).   Family History: family history includes Heart attack in her father and mother; Hypertension in her mother; Leukemia in her maternal aunt; Pneumonia in her brother.   Social History: reports that she quit smoking about 9 years ago. Her smoking use included cigarettes. She started smoking about 44 years ago. She has a 17.5 pack-year smoking history. She has been exposed to tobacco smoke. She has never used smokeless tobacco. She reports that she does not drink alcohol and does not use drugs.    (Not in a hospital admission)     Allergies: Pneumococcal vaccines, Codeine, Hydrocodone, and Sulfa antibiotics     Review of Systems   All other systems reviewed and are negative.      PHYSICAL EXAMINATION:   There were no vitals taken for this visit.   There were no vitals filed for this visit.                      ECOG Performance Status: 1 - Symptomatic but completely ambulatory      General Appearance:      alert, cooperative, no apparent distress and appears stated age   Lungs:   Clear to auscultation bilaterally; respirations regular, even, and unlabored bilaterally   Heart:  Regular rate and rhythm, no murmurs appreciated   Abdomen:   Soft, non-tender, and non-distended                 Hospital Outpatient Visit on 01/22/2025   Component Date Value Ref Range Status    Creatinine 01/22/2025 1.20  0.60 - 1.30 mg/dL Final    Serial Number: [id number]:  [id number]        CT Chest With & Without Contrast Diagnostic    Result Date: 1/25/2025  Narrative: CT CHEST  W WO CONTRAST DIAGNOSTIC Date of Exam: 1/22/2025 2:22 PM EST Indication: Follow up bilateral lung cancer. Comparison: 1/29/2024 Technique: Axial CT images were obtained of the chest before and after the uneventful intravenous administration of contrast .  Reconstructed coronal and sagittal images were also obtained. Automated exposure control and iterative construction methods were used. Findings: The central airways are patent. There are changes of emphysema as well as some peripheral areas of fibrosis, particularly in the right lower lobe. The patient is status post right upper lobectomy. No evidence of recurrence along the resection margin. The  patient is status post left upper lobectomy. No evidence of recurrence along the resection margin. No suspicious pulmonary nodules are identified bilaterally. No axillary or mediastinal adenopathy. Aorta is normal in caliber. Pulmonary artery is normal in caliber. Esophagus is normal. Subcentimeter thyroid nodule. Severe coronary artery calcifications. Limited evaluation of the upper abdomen demonstrates post left adrenal ectomy. There is atrophy of the left kidney with hypertrophy of the right kidney. No aggressive appearing lytic or sclerotic bone lesions.     Impression: Impression: Status post bilateral upper lobectomies without evidence of recurrent or metastatic disease. Electronically Signed: Brady Carrington MD  1/25/2025 11:15 AM EST  Workstation ID: [id number]       ASSESSMENT: The patient is a very pleasant 85 y.o. female  with bilateral lung cancers      PLAN:    1.  Bilateral upper lobes non-small cell lung cancer:  A.  I did go over the scan results with the patient from January 25, 2025.  I reassured the patient no evidence of recurrent malignancy per  B.  I will continue annual surveillance at this point.    2.  Hypertension:  A.  The patient will continue metoprolol as well as Benicar HCTZ    3. Hypercholesteremia:  A. She will continue Crestor 10 mg  daily.    FOLLOW UP: 1 year with CT scan.     Luisana Washburn MD  1/29/2025

## 2025-02-04 ENCOUNTER — TRANSCRIBE ORDERS (OUTPATIENT)
Dept: ADMINISTRATIVE | Facility: HOSPITAL | Age: 85
End: 2025-02-04
Payer: MEDICARE

## 2025-02-04 DIAGNOSIS — Z12.31 VISIT FOR SCREENING MAMMOGRAM: Primary | ICD-10-CM

## 2025-02-10 ENCOUNTER — TRANSCRIBE ORDERS (OUTPATIENT)
Dept: GENERAL RADIOLOGY | Facility: HOSPITAL | Age: 85
End: 2025-02-10
Payer: MEDICARE

## 2025-02-10 ENCOUNTER — HOSPITAL ENCOUNTER (OUTPATIENT)
Dept: GENERAL RADIOLOGY | Facility: HOSPITAL | Age: 85
Discharge: HOME | End: 2025-02-10
Admitting: PHYSICIAN ASSISTANT
Payer: MEDICARE

## 2025-02-10 DIAGNOSIS — R05.1 ACUTE COUGH: Primary | ICD-10-CM

## 2025-02-10 PROCEDURE — 71046 X-RAY EXAM CHEST 2 VIEWS: CPT

## 2025-03-05 ENCOUNTER — TELEPHONE (OUTPATIENT)
Dept: PULMONOLOGY | Facility: CLINIC | Age: 85
End: 2025-03-05

## 2025-03-05 NOTE — TELEPHONE ENCOUNTER
The [facility] received a fax that requires your attention. The document has been indexed to the patient’s chart for your review.      Documents Description:   LABS 02-, 02-, 02-, AND PA AND LATERAL CHEST XRAY 02-    Name of Sender:   [address] [address]  [phone]  Date Indexed: 03-    Notes (if needed): INDEXED UNDER LABS AND RADIOLOGY.

## 2025-03-14 ENCOUNTER — OFFICE VISIT (OUTPATIENT)
Dept: PULMONOLOGY | Facility: CLINIC | Age: 85
End: 2025-03-14
Payer: MEDICARE

## 2025-03-14 VITALS
OXYGEN SATURATION: 96 % | WEIGHT: 212 LBS | HEART RATE: 87 BPM | HEIGHT: 65 IN | SYSTOLIC BLOOD PRESSURE: 122 MMHG | BODY MASS INDEX: 35.32 KG/M2 | DIASTOLIC BLOOD PRESSURE: 58 MMHG

## 2025-03-14 DIAGNOSIS — J43.2 CENTRILOBULAR EMPHYSEMA: Primary | ICD-10-CM

## 2025-03-14 DIAGNOSIS — Z85.118 HISTORY OF LUNG CANCER: ICD-10-CM

## 2025-03-14 DIAGNOSIS — G47.33 OSA ON CPAP: ICD-10-CM

## 2025-03-14 PROCEDURE — 3078F DIAST BP <80 MM HG: CPT | Performed by: NURSE PRACTITIONER

## 2025-03-14 PROCEDURE — 3074F SYST BP LT 130 MM HG: CPT | Performed by: NURSE PRACTITIONER

## 2025-03-14 PROCEDURE — 99214 OFFICE O/P EST MOD 30 MIN: CPT | Performed by: NURSE PRACTITIONER

## 2025-03-14 NOTE — PROGRESS NOTES
Follow Up Office Visit      Patient Name: America Nix    Chief Complaint:    Chief Complaint   Patient presents with    Breathing Problem       History of Present Illness: America Nix is a 85 y.o. female who is here today for follow up of emphysema.  Since last visit, she notes that she has recently required exacerbation treatment with antibiotics and steroids via her PCP, and those symptoms are now improved.  She reports compliance with Trelegy.  She requires albuterol use 3-4 times per day.  No current cough, occasional wheezing.  She continues to follow with oncology for her history of lung cancer.  + VINNIE on CPAP.     Supplemental Oxygen: 2L qhs bled into CPAP & PRN w/ exertion  Last Steroids: March 2025  Number of exacerbations per year: 5+    Subjective      Review of Systems:  Review of Systems   Constitutional:  Negative for fever and unexpected weight change.   Respiratory:  Positive for shortness of breath and wheezing. Negative for cough.    Cardiovascular:  Negative for chest pain.        Past Medical History:   Past Medical History:   Diagnosis Date    JOSH (acute kidney injury) 6/10/2022    Arthritis     GERD (gastroesophageal reflux disease)     Hypercholesteremia     Hypertension     Lung cancer 2015    LEFT LUNG LOBECTOMY     Lung cancer 2017    RIGHT     On home O2     2.5 L HS,     Pneumonia     Pneumothorax 2015    AFTER LUNG BIOPSY     Seasonal allergies     Sleep apnea     Wears dentures     Wears glasses        Past Surgical History:   Past Surgical History:   Procedure Laterality Date    ADRENALECTOMY Left 04/06/2018    Procedure: ADRENALECTOMY LAPAROSCOPIC LEFT;  Surgeon: Carol Ann Guzmán MD;  Location:  [facility] OR;  Service: General    BREAST BIOPSY Right     BREAST LUMPECTOMY Left     BRONCHOSCOPY N/A 11/30/2017    Procedure: BRONCHOSCOPY;  Surgeon: Jeremy Herrera MD;  Location:  [facility] OR;  Service:     CHEST TUBE INSERTION  2015    LEFT LUNG AFTER LUNG BIOPSY D/T PNEUMOTHORAX      COLONOSCOPY      GOLD SEED FIDUCIAL PLACEMENT  2018    left adrenal gland    HYSTERECTOMY      LUNG BIOPSY Bilateral     LEFT IN , RIGHT IN 2017     LUNG LOBECTOMY Left 2015    YELENA PER DR HERRERA     TEETH EXTRACTION      THORACOSCOPY VIDEO ASSISTED WITH LOBECTOMY Right 2017    Procedure: THORACOSCOPY VIDEO ASSISTED WITH RIGHT UPPER LOBE WEDGE RESECTION AND COMPLETION OF RIGHT UPPER LOBECTOMY, MEDIASTINAL LYMPH NODE DISSECTION AND INTERCOSTAL NERVE BLOCKS;  Surgeon: Jeremy Herrera MD;  Location: [facility];  Service:        Family History:   Family History   Problem Relation Age of Onset    Hypertension Mother     Heart attack Mother     Heart attack Father     Pneumonia Brother     Leukemia Maternal Aunt        Social History:   Social History     Socioeconomic History    Marital status:      Spouse name: Hardy    Number of children: 0   Tobacco Use    Smoking status: Former     Current packs/day: 0.00     Average packs/day: 0.5 packs/day for 35.0 years (17.5 ttl pk-yrs)     Types: Cigarettes     Start date: 1980     Quit date: 2015     Years since quittin.6     Passive exposure: Past    Smokeless tobacco: Never    Tobacco comments:     smoked up to 2ppd at times   Vaping Use    Vaping status: Never Used   Substance and Sexual Activity    Alcohol use: No    Drug use: No    Sexual activity: Defer       Current Medications:     Current Outpatient Medications:     albuterol (PROVENTIL) (2.5 MG/3ML) 0.083% nebulizer solution, USE 1 VIAL IN NEBULIZER EVERY 4 HOURS AS NEEDED FOR WHEEZING, Disp: 540 mL, Rfl: 0    Ascorbic Acid (VITAMIN C ER PO), Take  by mouth. 2000 units po, Disp: , Rfl:     aspirin 81 MG EC tablet, Take 1 tablet by mouth Daily., Disp: , Rfl:     Cholecalciferol (Vitamin D3) 50 MCG (2000 UT) capsule, Take 1 capsule by mouth Daily., Disp: , Rfl:     docusate sodium (COLACE) 50 MG capsule, Take 1 capsule by mouth Daily., Disp: , Rfl:     folic acid (FOLVITE) 1 MG  tablet, Take 1 tablet by mouth Daily., Disp: , Rfl:     furosemide (LASIX) 20 MG tablet, TAKE 1 TABLET BY MOUTH ONCE DAILY AS NEEDED (LEG  SWELLING/SHORTNESS  OF  BREATH), Disp: 30 tablet, Rfl: 0    guaiFENesin (MUCINEX) 600 MG 12 hr tablet, Take 2 tablets by mouth 2 (Two) Times a Day., Disp: , Rfl:     Hydrocod Pascual-Chlorphe Pascual ER (TUSSIONEX PENNKINETIC) 10-8 MG/5ML ER suspension, TAKE 1 TEASPOONFUL (5 ML) BY MOUTH EVERY TWELVE HOURS AS NEEDED FOR COUGH, Disp: , Rfl:     loratadine (CLARITIN) 10 MG tablet, Take 1 tablet by mouth Daily., Disp: , Rfl:     metoprolol succinate XL (TOPROL-XL) 25 MG 24 hr tablet, Take 1 tablet by mouth Daily., Disp: 90 tablet, Rfl: 3    montelukast (SINGULAIR) 10 MG tablet, Take 1 tablet by mouth Daily As Needed (allergies)., Disp: , Rfl:     ondansetron ODT (ZOFRAN-ODT) 4 MG disintegrating tablet, Place 1 tablet on the tongue Every 8 (Eight) Hours As Needed for Nausea or Vomiting., Disp: 12 tablet, Rfl: 0    rosuvastatin (CRESTOR) 10 MG tablet, Take 1 tablet by mouth Daily., Disp: , Rfl:     telmisartan (MICARDIS) 20 MG tablet, Take 1 tablet by mouth Daily., Disp: , Rfl:     telmisartan-hydrochlorothiazide (MICARDIS HCT) 40-12.5 MG per tablet, Take 1 tablet by mouth Daily., Disp: , Rfl:     Trelegy Ellipta 100-62.5-25 MCG/ACT inhaler, Inhale 1 puff Daily. Rinse mouth out after use, Disp: 3 each, Rfl: 3    Triamcinolone Acetonide (NASACORT AQ NA), 2 sprays into each nostril Daily., Disp: , Rfl:     Unable to find, 1 each Every Night. Med Name: C PAP MACHINE, Disp: , Rfl:     ELDERBERRY PO, Take  by mouth. (Patient not taking: Reported on 3/14/2025), Disp: , Rfl:     Olmesartan-amLODIPine-HCTZ 40-10-12.5 MG tablet, Take 1 tablet every day by oral route., Disp: , Rfl:     omeprazole (priLOSEC) 20 MG capsule, Take 1 capsule by mouth As Needed. (Patient not taking: Reported on 3/14/2025), Disp: , Rfl:     predniSONE (DELTASONE) 10 MG tablet, Once per day, Disp: , Rfl:      Allergies:  "  Allergies   Allergen Reactions    Pneumococcal Vaccines Swelling     REDNESS AND SWELLING OF ARM     Codeine Nausea And Vomiting    Hydrocodone Nausea And Vomiting    Sulfa Antibiotics Unknown - Low Severity     Objective     Physical Exam:  Vital Signs:   Vitals:    03/14/25 1110   BP: 122/58   Pulse: 87   SpO2: 96%   Weight: 96.2 kg (212 lb)   Height: 165.1 cm (65\")     Body mass index is 35.28 kg/m².    Physical Exam  Vitals reviewed.   Constitutional:       General: She is not in acute distress.     Appearance: She is obese. She is not toxic-appearing.   HENT:      Head: Normocephalic and atraumatic.      Mouth/Throat:      Mouth: Mucous membranes are moist.   Eyes:      Extraocular Movements: Extraocular movements intact.      Conjunctiva/sclera: Conjunctivae normal.   Cardiovascular:      Rate and Rhythm: Normal rate.      Heart sounds: Normal heart sounds.   Pulmonary:      Effort: Pulmonary effort is normal.      Breath sounds: Normal breath sounds.   Abdominal:      General: There is no distension.      Palpations: Abdomen is soft.   Musculoskeletal:         General: No swelling.      Cervical back: Neck supple.   Skin:     General: Skin is warm and dry.      Findings: No rash.   Neurological:      General: No focal deficit present.      Mental Status: She is alert and oriented to person, place, and time.   Psychiatric:         Mood and Affect: Mood normal.         Behavior: Behavior normal.       Results Review:   January 2025 chest CT scan with and without contrast showed status post bilateral upper lobectomies without evidence of recurrent or metastatic disease.    February 2025 chest x-ray showed no acute chest finding.    WBC   Date Value Ref Range Status   06/16/2023 11.81 (H) 3.40 - 10.80 10*3/mm3 Final   07/20/2020 7.77 3.40 - 10.80 10*3/mm3 Final     RBC   Date Value Ref Range Status   06/16/2023 4.11 3.77 - 5.28 10*6/mm3 Final   07/20/2020 3.76 (L) 3.77 - 5.28 10*6/mm3 Final     Hemoglobin "   Date Value Ref Range Status   06/16/2023 11.7 (L) 12.0 - 15.9 g/dL Final     Hematocrit   Date Value Ref Range Status   06/16/2023 35.9 34.0 - 46.6 % Final     MCV   Date Value Ref Range Status   06/16/2023 87.3 79.0 - 97.0 fL Final     MCH   Date Value Ref Range Status   06/16/2023 28.5 26.6 - 33.0 pg Final     MCHC   Date Value Ref Range Status   06/16/2023 32.6 31.5 - 35.7 g/dL Final     RDW   Date Value Ref Range Status   06/16/2023 14.0 12.3 - 15.4 % Final     RDW-SD   Date Value Ref Range Status   06/16/2023 45.1 37.0 - 54.0 fl Final     MPV   Date Value Ref Range Status   06/16/2023 9.1 6.0 - 12.0 fL Final     Platelets   Date Value Ref Range Status   06/16/2023 302 140 - 450 10*3/mm3 Final     Neutrophil %   Date Value Ref Range Status   06/16/2023 78.4 (H) 42.7 - 76.0 % Final     Lymphocyte %   Date Value Ref Range Status   06/16/2023 10.8 (L) 19.6 - 45.3 % Final     Monocyte %   Date Value Ref Range Status   06/16/2023 8.0 5.0 - 12.0 % Final     Eosinophil %   Date Value Ref Range Status   06/16/2023 2.2 0.3 - 6.2 % Final     Basophil %   Date Value Ref Range Status   06/16/2023 0.3 0.0 - 1.5 % Final     Immature Grans %   Date Value Ref Range Status   06/16/2023 0.3 0.0 - 0.5 % Final     Neutrophils, Absolute   Date Value Ref Range Status   06/16/2023 9.25 (H) 1.70 - 7.00 10*3/mm3 Final     Lymphocytes, Absolute   Date Value Ref Range Status   06/16/2023 1.27 0.70 - 3.10 10*3/mm3 Final     Monocytes, Absolute   Date Value Ref Range Status   06/16/2023 0.95 (H) 0.10 - 0.90 10*3/mm3 Final     Eosinophils, Absolute   Date Value Ref Range Status   06/16/2023 0.26 0.00 - 0.40 10*3/mm3 Final     Basophils, Absolute   Date Value Ref Range Status   06/16/2023 0.04 0.00 - 0.20 10*3/mm3 Final     Immature Grans, Absolute   Date Value Ref Range Status   06/16/2023 0.04 0.00 - 0.05 10*3/mm3 Final     nRBC   Date Value Ref Range Status   06/16/2023 0.0 0.0 - 0.2 /100 WBC Final     February 2025 absolute eosinophils  250.    Assessment / Plan      Assessment/Plan:   Diagnoses and all orders for this visit:    1. Centrilobular emphysema (Primary)  -     Fluticasone-Umeclidin-Vilant (TRELEGY ELLIPTA) 200-62.5-25 MCG/ACT inhaler; Inhale 1 puff Daily for 30 days. Rinse mouth out after use  Dispense: 3 each; Refill: 3  -     Complete PFT - Pre & Post Bronchodilator; Future  Increase Trelegy dose today based on exacerbation frequency. We discussed the risk and benefits of inhaled corticosteroids. Patient instructed to take them on a regular basis as prescribed. Patient instructed to rinse their mouth out after each use.  Per review of labs and prior PFTs, may need to consider use of a biologic agent if she continues to exacerbate.  Schedule repeat PFTs prior to next clinic visit.    2. VINNIE on CPAP  Severe sleep apnea.  Continue on CPAP nightly.  Request compliance report from [employer].  Discussed that use of Zepbound could be considered, which she will think about but is not currently interested in.    3. History of lung cancer  Recent chest imaging reports reviewed.  Continue to follow with oncology for surveillance chest imaging.       Follow Up:   Return in about 6 months (around 9/14/2025) for Recheck.  The patient was counseled on diagnostic results, risks and benefits of treatment options, risk factor modifications and the importance of treatment compliance. The patient was advised to contact the clinic with concerns or worsening symptoms.     JENN Emmanuel   Pulmonary Medicine [place]     This document has been electronically signed by JENN Emmanuel  March 14, 2025

## 2025-04-02 ENCOUNTER — HOSPITAL ENCOUNTER (OUTPATIENT)
Dept: MAMMOGRAPHY | Facility: HOSPITAL | Age: 85
Discharge: HOME | End: 2025-04-02
Admitting: FAMILY MEDICINE
Payer: MEDICARE

## 2025-04-02 DIAGNOSIS — Z12.31 VISIT FOR SCREENING MAMMOGRAM: ICD-10-CM

## 2025-04-02 PROCEDURE — 77067 SCR MAMMO BI INCL CAD: CPT

## 2025-04-02 PROCEDURE — 77063 BREAST TOMOSYNTHESIS BI: CPT

## 2025-04-08 ENCOUNTER — TRANSCRIBE ORDERS (OUTPATIENT)
Dept: ADMINISTRATIVE | Facility: HOSPITAL | Age: 85
End: 2025-04-08
Payer: MEDICARE

## 2025-04-08 DIAGNOSIS — R92.8 ABNORMAL MAMMOGRAM: Primary | ICD-10-CM

## 2025-04-22 ENCOUNTER — HOSPITAL ENCOUNTER (OUTPATIENT)
Facility: HOSPITAL | Age: 85
Discharge: HOME | End: 2025-04-22
Payer: MEDICARE

## 2025-04-22 ENCOUNTER — TRANSCRIBE ORDERS (OUTPATIENT)
Dept: ADMINISTRATIVE | Facility: HOSPITAL | Age: 85
End: 2025-04-22
Payer: MEDICARE

## 2025-04-22 DIAGNOSIS — R92.8 ABNORMAL MAMMOGRAM: Primary | ICD-10-CM

## 2025-04-22 DIAGNOSIS — R92.8 ABNORMAL MAMMOGRAM: ICD-10-CM

## 2025-04-22 PROCEDURE — 76642 ULTRASOUND BREAST LIMITED: CPT

## 2025-04-22 PROCEDURE — G0279 TOMOSYNTHESIS, MAMMO: HCPCS

## 2025-04-22 PROCEDURE — 76642 ULTRASOUND BREAST LIMITED: CPT | Performed by: RADIOLOGY

## 2025-04-22 PROCEDURE — 77065 DX MAMMO INCL CAD UNI: CPT

## 2025-04-22 PROCEDURE — G0279 TOMOSYNTHESIS, MAMMO: HCPCS | Performed by: RADIOLOGY

## 2025-04-22 PROCEDURE — 77065 DX MAMMO INCL CAD UNI: CPT | Performed by: RADIOLOGY

## 2025-05-14 ENCOUNTER — HOSPITAL ENCOUNTER (OUTPATIENT)
Facility: HOSPITAL | Age: 85
Discharge: HOME | End: 2025-05-14
Payer: MEDICARE

## 2025-05-14 DIAGNOSIS — R92.8 ABNORMAL MAMMOGRAM: ICD-10-CM

## 2025-05-14 PROCEDURE — A4648 IMPLANTABLE TISSUE MARKER: HCPCS

## 2025-05-14 PROCEDURE — 77065 DX MAMMO INCL CAD UNI: CPT | Performed by: RADIOLOGY

## 2025-05-14 PROCEDURE — 25010000002 LIDOCAINE 1% - EPINEPHRINE 1:100000 1 %-1:100000 SOLUTION: Performed by: RADIOLOGY

## 2025-05-14 PROCEDURE — 19083 BX BREAST 1ST LESION US IMAG: CPT | Performed by: RADIOLOGY

## 2025-05-14 PROCEDURE — 25010000002 LIDOCAINE 1 % SOLUTION: Performed by: RADIOLOGY

## 2025-05-14 RX ORDER — LIDOCAINE HYDROCHLORIDE 10 MG/ML
5 INJECTION, SOLUTION INFILTRATION; PERINEURAL ONCE
Status: COMPLETED | OUTPATIENT
Start: 2025-05-14 | End: 2025-05-14

## 2025-05-14 RX ORDER — LIDOCAINE HYDROCHLORIDE AND EPINEPHRINE 10; 10 MG/ML; UG/ML
10 INJECTION, SOLUTION INFILTRATION; PERINEURAL ONCE
Status: COMPLETED | OUTPATIENT
Start: 2025-05-14 | End: 2025-05-14

## 2025-05-14 RX ADMIN — LIDOCAINE HYDROCHLORIDE,EPINEPHRINE BITARTRATE 2 ML: 10; .01 INJECTION, SOLUTION INFILTRATION; PERINEURAL at 09:56

## 2025-05-14 RX ADMIN — LIDOCAINE HYDROCHLORIDE 3 ML: 10 INJECTION, SOLUTION INFILTRATION; PERINEURAL at 09:56

## 2025-05-19 ENCOUNTER — TELEPHONE (OUTPATIENT)
Dept: MAMMOGRAPHY | Facility: HOSPITAL | Age: 85
End: 2025-05-19
Payer: MEDICARE

## 2025-05-19 LAB
CYTO UR: NORMAL
LAB AP CASE REPORT: NORMAL
LAB AP CLINICAL INFORMATION: NORMAL
LAB AP DIAGNOSIS COMMENT: NORMAL
PATH REPORT.FINAL DX SPEC: NORMAL
PATH REPORT.GROSS SPEC: NORMAL

## 2025-05-19 NOTE — TELEPHONE ENCOUNTER
Patient notified of surgical consult appointment with Dr ROGER Paul on 6/2/25 @ 8806 with arrival time of [address] [facility] [address]. Patient given office contact & location information. Patient aware they will receive an information packet from [facility] with detailed location instructions. Patient notified to bring photo ID, insurance information, list of prescription & OTC medications. Patient may be accompanied by family member or friend for support. Patient information sent to breast nurse navigator for evaluation.   Psych notified of consult thru perfect serve at 1:58pm

## 2025-05-19 NOTE — TELEPHONE ENCOUNTER
Referring provider's office notified  Epic basket message  pathology returned as cancer and patient will be notified. Patient notified of pathology results and recommendation. Verbalizes understanding. Denies discomfort. Denies signs and symptoms of infection.     Patient desires first available for surgical consult. Patient will be notified of appointment. Patient verbalized understanding.    Reviewed what would be discussed at surgical consult visit, including detailed explanation of pathology report & imaging reports; treatment options & pros/cons, availability of breast nurse navigator. Patient encouraged to contact [facility] nurse with questions or concerns. Breast cancer information packet offered and accepted. Patient verbalized understanding.

## 2025-06-02 ENCOUNTER — PATIENT OUTREACH (OUTPATIENT)
Dept: OTHER | Facility: HOSPITAL | Age: 85
End: 2025-06-02
Payer: MEDICARE

## 2025-06-02 DIAGNOSIS — C50.911 MALIGNANT NEOPLASM OF RIGHT BREAST IN FEMALE, ESTROGEN RECEPTOR POSITIVE, UNSPECIFIED SITE OF BREAST: Primary | ICD-10-CM

## 2025-06-02 DIAGNOSIS — Z17.0 MALIGNANT NEOPLASM OF RIGHT BREAST IN FEMALE, ESTROGEN RECEPTOR POSITIVE, UNSPECIFIED SITE OF BREAST: Primary | ICD-10-CM

## 2025-06-02 NOTE — SIGNIFICANT NOTE
Met patient with Dr. Paul to discuss surgery for her recent breast cancer diagnosis. Pt wants bilateral mastectomy. She has a history of lung cancer and Dr. Washburn is her medical oncologist, so she will follow up with him postoperatively for further treatment of her breast cancer. NN reviewed educational and community resources. Pt has been scheduled for surgery on 6/25/25.     06/02/25 [id number]   Nurse Navigation   Current Status Active   Type of Visit New patient   Location of Visit Surgeon's office   Visit Diagnosis Breast - malignant   Referral Source Health professional - outpatient   Treatments Surgery   Date of Diagnosis 05/19/25   Surgery - First Consult Appointment 06/02/25   Surgery - Start of Treatment Date 06/25/25   Barriers to Care Family   Practical Needs Nurse Navigator Referral   Intervention Tasks Performed Education;Symptom management;Community resources;Cancer prevention/Screening;Outpatient appt   Time Navigated Today (Min) 65   Total Time Navigated (Min) 65   Acuity Rating   Time spent with patient 3- greater than 45 minutes   Multimodality treatment coordination and education 2-  Arrange, transfer care, second opinion   Caregiver support 1- Family/significant other support available   Distress score 1- 0-3 clinical   Coordination of care  - appts 2- Multiple appts   Appt compliance 1- Compliant   ECOG/Karnofsky Score 1- ECOG -Less than or equal to 1,  Karnofsky 90 or greater   PHQ 9 score  1- <10 clinical   Referrals to support services 1- None   Acuity score 13   Acuity level Medium acuity

## 2025-06-18 ENCOUNTER — ANESTHESIA EVENT (OUTPATIENT)
Dept: PERIOP | Facility: HOSPITAL | Age: 85
End: 2025-06-18
Payer: MEDICARE

## 2025-06-18 ENCOUNTER — PRE-ADMISSION TESTING (OUTPATIENT)
Dept: PREADMISSION TESTING | Facility: HOSPITAL | Age: 85
End: 2025-06-18
Payer: MEDICARE

## 2025-06-18 VITALS — BODY MASS INDEX: 36.74 KG/M2 | WEIGHT: 207.34 LBS | HEIGHT: 63 IN

## 2025-06-18 LAB
ALBUMIN SERPL-MCNC: 3.6 G/DL (ref 3.5–5.2)
ALBUMIN/GLOB SERPL: 1.2 G/DL
ALP SERPL-CCNC: 73 U/L (ref 39–117)
ALT SERPL W P-5'-P-CCNC: 16 U/L (ref 1–33)
ANION GAP SERPL CALCULATED.3IONS-SCNC: 11 MMOL/L (ref 5–15)
AST SERPL-CCNC: 17 U/L (ref 1–32)
BILIRUB SERPL-MCNC: 0.3 MG/DL (ref 0–1.2)
BUN SERPL-MCNC: 35.8 MG/DL (ref 8–23)
BUN/CREAT SERPL: 29.1 (ref 7–25)
CALCIUM SPEC-SCNC: 10.7 MG/DL (ref 8.6–10.5)
CHLORIDE SERPL-SCNC: 107 MMOL/L (ref 98–107)
CO2 SERPL-SCNC: 25 MMOL/L (ref 22–29)
CREAT SERPL-MCNC: 1.23 MG/DL (ref 0.57–1)
DEPRECATED RDW RBC AUTO: 49.1 FL (ref 37–54)
EGFRCR SERPLBLD CKD-EPI 2021: 43.7 ML/MIN/1.73
ERYTHROCYTE [DISTWIDTH] IN BLOOD BY AUTOMATED COUNT: 13.9 % (ref 12.3–15.4)
GLOBULIN UR ELPH-MCNC: 2.9 GM/DL
GLUCOSE SERPL-MCNC: 98 MG/DL (ref 65–99)
HCT VFR BLD AUTO: 36.1 % (ref 34–46.6)
HGB BLD-MCNC: 11.3 G/DL (ref 12–15.9)
MCH RBC QN AUTO: 29.9 PG (ref 26.6–33)
MCHC RBC AUTO-ENTMCNC: 31.3 G/DL (ref 31.5–35.7)
MCV RBC AUTO: 95.5 FL (ref 79–97)
PLATELET # BLD AUTO: 279 10*3/MM3 (ref 140–450)
PMV BLD AUTO: 9.5 FL (ref 6–12)
POTASSIUM SERPL-SCNC: 5 MMOL/L (ref 3.5–5.2)
PROT SERPL-MCNC: 6.5 G/DL (ref 6–8.5)
QT INTERVAL: 394 MS
QTC INTERVAL: 449 MS
RBC # BLD AUTO: 3.78 10*6/MM3 (ref 3.77–5.28)
SODIUM SERPL-SCNC: 143 MMOL/L (ref 136–145)
WBC NRBC COR # BLD AUTO: 10.92 10*3/MM3 (ref 3.4–10.8)

## 2025-06-18 PROCEDURE — 93005 ELECTROCARDIOGRAM TRACING: CPT

## 2025-06-18 PROCEDURE — 36415 COLL VENOUS BLD VENIPUNCTURE: CPT

## 2025-06-18 PROCEDURE — 85027 COMPLETE CBC AUTOMATED: CPT

## 2025-06-18 PROCEDURE — 80053 COMPREHEN METABOLIC PANEL: CPT

## 2025-06-18 RX ORDER — ROSUVASTATIN CALCIUM 20 MG/1
20 TABLET, COATED ORAL NIGHTLY
COMMUNITY
Start: 2025-04-01

## 2025-06-18 RX ORDER — FLUTICASONE FUROATE, UMECLIDINIUM BROMIDE AND VILANTEROL TRIFENATATE 200; 62.5; 25 UG/1; UG/1; UG/1
POWDER RESPIRATORY (INHALATION)
COMMUNITY

## 2025-06-18 RX ORDER — OXYBUTYNIN CHLORIDE 10 MG/1
TABLET, EXTENDED RELEASE ORAL
COMMUNITY
Start: 2025-05-07

## 2025-06-18 NOTE — PAT
An arrival time for procedure was not provided during PAT visit. If patient had any questions or concerns about their arrival time, they were instructed to contact their surgeon/physician.  Additionally, if the patient referred to an arrival time that was acquired from their my chart account, patient was encouraged to verify that time with their surgeon/physician. Arrival times are NOT provided in Pre Admission Testing Department.    Patient viewed general PAT education video as instructed in their preoperative information received from their surgeon.  Patient stated the general PAT education video was viewed in its entirety and survey completed.  Copies of PAT general education handouts (Incentive Spirometry, Meds to Beds Program, Patient Belongings, Pre-op skin preparation instructions, Blood Glucose testing, Visitor policy, Surgery FAQ, Code H) distributed to patient if not printed. Education related to the PAT pass and skin preparation for surgery (if applicable) completed in PAT as a reinforcement to PAT education video. Patient instructed to return PAT pass provided today as well as completed skin preparation sheet (if applicable) on the day of procedure.     Additionally if patient had not viewed video yet but intended to view it at home or in our waiting area, then referred them to the handout with QR code/link provided during PAT visit.  Encouraged patient/family to read PAT general education handouts thoroughly and notify PAT staff with any questions or concerns. Patient verbalized understanding of all information and priority content.    Per Anesthesia Request, patient instructed not to take their ACE/ARB medications on the AM of surgery.    Patient instructed to drink 20 ounces of Gatorade or Gatorlyte (if diabetic) and it needs to be completed 1 hour (for Main OR patients) or 2 hours (scheduled  section & [facility] patients) before given arrival time for procedure (NO RED Gatorade and NO Gatorade  Zero).    Patient verbalized understanding.    Patient denies any current skin issues.     Patient to apply Chlorhexadine wipes  to surgical area (as instructed) the night before procedure and the AM of procedure. Wipes provided.    EKG from [facility] today faxed to anesthesiology department for review and cardiac clearance. RN spoke with DR. KEARNS  and reviewed pertinent medical history and EKG results.  Per DR. KEARNS, patient is NOT cleared to proceed with procedure as planned without additional cardiac testing. Patient denies chest pain or increased shortness of breath. DR. BEAL'S  NOTIFIED THAT CARDIAC CLEARANCE WILL BE NEEDED PRIOR TO SURGERY AND PATIENT ADVISED THEY WILL FOLLOW UP WITH PATIENT FOR CLEARANCE.

## 2025-06-24 ENCOUNTER — OFFICE VISIT (OUTPATIENT)
Dept: CARDIOLOGY | Facility: CLINIC | Age: 85
End: 2025-06-24
Payer: MEDICARE

## 2025-06-24 VITALS
OXYGEN SATURATION: 96 % | WEIGHT: 208 LBS | HEART RATE: 74 BPM | SYSTOLIC BLOOD PRESSURE: 140 MMHG | BODY MASS INDEX: 36.86 KG/M2 | DIASTOLIC BLOOD PRESSURE: 78 MMHG | HEIGHT: 63 IN

## 2025-06-24 DIAGNOSIS — Z01.818 PREOPERATIVE CLEARANCE: ICD-10-CM

## 2025-06-24 DIAGNOSIS — I10 ESSENTIAL HYPERTENSION: Primary | ICD-10-CM

## 2025-06-24 DIAGNOSIS — E78.5 HYPERLIPIDEMIA LDL GOAL <70: ICD-10-CM

## 2025-06-24 PROBLEM — I45.10 RIGHT BUNDLE BRANCH BLOCK: Status: ACTIVE | Noted: 2025-06-24

## 2025-06-24 PROCEDURE — 1160F RVW MEDS BY RX/DR IN RCRD: CPT | Performed by: NURSE PRACTITIONER

## 2025-06-24 PROCEDURE — 99214 OFFICE O/P EST MOD 30 MIN: CPT | Performed by: NURSE PRACTITIONER

## 2025-06-24 PROCEDURE — 3078F DIAST BP <80 MM HG: CPT | Performed by: NURSE PRACTITIONER

## 2025-06-24 PROCEDURE — 3077F SYST BP >= 140 MM HG: CPT | Performed by: NURSE PRACTITIONER

## 2025-06-24 PROCEDURE — 1159F MED LIST DOCD IN RCRD: CPT | Performed by: NURSE PRACTITIONER

## 2025-06-24 NOTE — ASSESSMENT & PLAN NOTE
Pressure is borderline  If BP greater than 130/80 at next visit consider up titration of antihypertensive

## 2025-06-24 NOTE — ASSESSMENT & PLAN NOTE
Patient has right bundle branch block dating back to 2022 at which time she had stress echocardiogram that was normal.  She has no new symptoms and no symptoms of high degree AV block.  She is of acceptable cardiovascular risk to proceed with bilateral mastectomy with Dr. Paul.

## 2025-06-24 NOTE — PROGRESS NOTES
[facility]  Cardiology Consultation Note     America Nix  1940  Requesting Provider: No ref. provider found  PCP: Hai Solomon MD    ID:  America Nix is a 85 y.o. female who resides in [place], [place]    REASON FOR CONSULTATION:    Preoperative cardiovascular clearance         Dear Dr. Paul:    Patient is a 85-year-old female who is being referred by Dr. Paul for cardiac clearance prior to undergoing bilateral mastectomy for newly diagnosed breast cancer.  The patient has a history of lung cancer status post bilateral upper lobectomies.  In 2022 the patient was evaluated by cardiology for shortness of breath and the date audible murmur.  A CT of the chest showed some calcification in the coronary arteries.  She ultimately underwent a echocardiogram which showed normal LVEF.  Aortic valve was not well-visualized.  Tricuspid valve had moderate regurgitation.  She also underwent stress testing which did not show any ischemia.  Her recent EKG for preoperative testing showed sinus rhythm with first-degree AV block and right bundle branch block.  Her EKG in 2022 prior to testing showed presence of right bundle branch block as well.  She has no symptoms of high degree AV block and has no new or worsening symptoms since she was evaluated 3 years ago.  She does get short of breath with activity but nothing that is changed since her upper lobectomy's years ago.            Past Medical History, Past Surgical History, Family history, Social History, and Medications were all reviewed with the patient today and updated as necessary.       Current Outpatient Medications:     albuterol (PROVENTIL) (2.5 MG/3ML) 0.083% nebulizer solution, USE 1 VIAL IN NEBULIZER EVERY 4 HOURS AS NEEDED FOR WHEEZING, Disp: 540 mL, Rfl: 0    Ascorbic Acid (VITAMIN C ER PO), Take  by mouth. 2000 units po, Disp: , Rfl:     aspirin 81 MG EC tablet, Take 1 tablet by mouth Daily.,  Disp: , Rfl:     Cholecalciferol (Vitamin D3) 50 MCG (2000 UT) capsule, Take 1 capsule by mouth Daily., Disp: , Rfl:     Fluticasone-Umeclidin-Vilant (Trelegy Ellipta) 200-62.5-25 MCG/ACT inhaler, Inhale Daily., Disp: , Rfl:     folic acid (FOLVITE) 1 MG tablet, Take 1 tablet by mouth Daily., Disp: , Rfl:     furosemide (LASIX) 20 MG tablet, TAKE 1 TABLET BY MOUTH ONCE DAILY AS NEEDED (LEG  SWELLING/SHORTNESS  OF  BREATH), Disp: 30 tablet, Rfl: 0    guaiFENesin (MUCINEX) 600 MG 12 hr tablet, Take 2 tablets by mouth 2 (Two) Times a Day., Disp: , Rfl:     loratadine (CLARITIN) 10 MG tablet, Take 1 tablet by mouth Daily., Disp: , Rfl:     montelukast (SINGULAIR) 10 MG tablet, Take 1 tablet by mouth Daily As Needed (allergies)., Disp: , Rfl:     Olmesartan-amLODIPine-HCTZ 40-10-12.5 MG tablet, Take 1 tablet every day by oral route., Disp: , Rfl:     oxybutynin XL (DITROPAN-XL) 10 MG 24 hr tablet, , Disp: , Rfl:     rosuvastatin (CRESTOR) 20 MG tablet, Take 1 tablet by mouth Every Night., Disp: , Rfl:     telmisartan-hydrochlorothiazide (MICARDIS HCT) 40-12.5 MG per tablet, Take 1 tablet by mouth Daily., Disp: , Rfl:     Triamcinolone Acetonide (NASACORT AQ NA), 2 sprays into each nostril Daily., Disp: , Rfl:     Unable to find, 1 each Every Night. Med Name: C PAP MACHINE, Disp: , Rfl:     Allergies   Allergen Reactions    Pneumococcal Vaccines Swelling     REDNESS AND SWELLING OF ARM     Codeine Nausea And Vomiting    Hydrocodone Nausea And Vomiting    Sulfa Antibiotics Hives and Rash         Past Medical History:   Diagnosis Date    JOSH (acute kidney injury) 06/10/2022    Arthritis     COPD (chronic obstructive pulmonary disease)     Emphysema lung     GERD (gastroesophageal reflux disease)     Hypercholesteremia     Hypertension     Lung cancer 2015    LEFT LUNG LOBECTOMY     Lung cancer 2017    RIGHT     On home O2     2.5 L HS, 2L during day when needed    Pneumonia     Pneumothorax 2015    AFTER LUNG BIOPSY      PONV (postoperative nausea and vomiting)     Seasonal allergies     Sleep apnea     CPAP COMPLIANT    Wears dentures     Wears glasses        Past Surgical History:   Procedure Laterality Date    ADRENALECTOMY Left 2018    Procedure: ADRENALECTOMY LAPAROSCOPIC LEFT;  Surgeon: Carol Ann Guzmán MD;  Location:  [facility];  Service: General    BREAST BIOPSY Bilateral     Bilateral biopsies years ago- Both were benign per patient    BRONCHOSCOPY N/A 2017    Procedure: BRONCHOSCOPY;  Surgeon: Jeremy Herrera MD;  Location:  [facility];  Service:     CHEST TUBE INSERTION      LEFT LUNG AFTER LUNG BIOPSY D/T PNEUMOTHORAX     COLONOSCOPY      GOLD SEED FIDUCIAL PLACEMENT  2018    left adrenal gland    HYSTERECTOMY  1968    Complete Hysterectomy at age 28    LUNG BIOPSY Bilateral     LEFT IN , RIGHT IN      LUNG LOBECTOMY Left 2015    [facility] PER DR HERRERA     TEETH EXTRACTION      THORACOSCOPY VIDEO ASSISTED WITH LOBECTOMY Right 2017    Procedure: THORACOSCOPY VIDEO ASSISTED WITH RIGHT UPPER LOBE WEDGE RESECTION AND COMPLETION OF RIGHT UPPER LOBECTOMY, MEDIASTINAL LYMPH NODE DISSECTION AND INTERCOSTAL NERVE BLOCKS;  Surgeon: Jeremy Herrera MD;  Location:  [facility];  Service:        Family History   Problem Relation Age of Onset    Hypertension Mother     Heart attack Mother     Heart attack Father     Pneumonia Brother     Leukemia Maternal Aunt     Breast cancer Neg Hx     Ovarian cancer Neg Hx        Social History     Tobacco Use    Smoking status: Former     Current packs/day: 0.00     Average packs/day: 0.5 packs/day for 35.0 years (17.5 ttl pk-yrs)     Types: Cigarettes     Start date: 1980     Quit date: 2015     Years since quittin.9     Passive exposure: Past    Smokeless tobacco: Never    Tobacco comments:     smoked up to 2ppd at times   Substance Use Topics    Alcohol use: No       Review of Systems   Constitutional: Negative for malaise/fatigue.   Eyes:  Negative for  "vision loss in left eye and vision loss in right eye.   Cardiovascular:  Positive for dyspnea on exertion. Negative for chest pain, near-syncope, orthopnea, palpitations, paroxysmal nocturnal dyspnea and syncope.   Musculoskeletal:  Negative for myalgias.   Gastrointestinal:  Positive for bloating.   Neurological:  Negative for brief paralysis, excessive daytime sleepiness, focal weakness, numbness, paresthesias and weakness.   All other systems reviewed and are negative.              /78 (BP Location: Right arm, Patient Position: Sitting, Cuff Size: Adult)   Pulse 74   Ht 160 cm (63\")   Wt 94.3 kg (208 lb)   SpO2 96%   BMI 36.85 kg/m²        Constitutional:       Appearance: Healthy appearance. Well-developed.   Eyes:      General: Lids are normal. No scleral icterus.     Conjunctiva/sclera: Conjunctivae normal.   HENT:      Head: Normocephalic and atraumatic.   Neck:      Thyroid: No thyromegaly.      Vascular: No carotid bruit or JVD.   Pulmonary:      Effort: Pulmonary effort is normal.      Breath sounds: Normal breath sounds. No wheezing. No rhonchi. No rales.   Cardiovascular:      Normal rate. Regular rhythm.      No gallop.  No rub.   Pulses:     Intact distal pulses.   Edema:     Peripheral edema absent.   Abdominal:      General: There is no distension.      Palpations: Abdomen is soft. There is no abdominal mass.   Musculoskeletal:      Cervical back: Normal range of motion. Skin:     General: Skin is warm and dry.      Findings: No rash.   Neurological:      General: No focal deficit present.      Mental Status: Alert and oriented to person, place, and time.      Gait: Gait is intact.   Psychiatric:         Attention and Perception: Attention normal.         Mood and Affect: Mood normal.         Behavior: Behavior normal.           Procedures    Lab Results   Component Value Date    CHOLESTEROL 139 06/10/2022    HDL CHOL 50 06/10/2022    LDL CHOL 66 06/10/2022    VLDL CHOLESTEROL ADAMS 23 " 06/10/2022    TRIGLYCERIDES 134 06/10/2022     Lab Results   Component Value Date    GLUCOSE 98 06/18/2025    BUN 35.8 (H) 06/18/2025    CREATININE 1.23 (H) 06/18/2025     06/18/2025    K 5.0 06/18/2025     06/18/2025    CALCIUM 10.7 (H) 06/18/2025    PROTEINTOT 6.5 06/18/2025    ALBUMIN 3.6 06/18/2025    ALT 16 06/18/2025    AST 17 06/18/2025    ALKPHOS 73 06/18/2025    BILITOT 0.3 06/18/2025    GLOB 2.9 06/18/2025    AGRATIO 1.2 06/18/2025    BCR 29.1 (H) 06/18/2025    ANIONGAP 11.0 06/18/2025    EGFR 43.7 (L) 06/18/2025     Lab Results   Component Value Date    WBC 10.92 (H) 06/18/2025    HGB 11.3 (L) 06/18/2025    HCT 36.1 06/18/2025    MCV 95.5 06/18/2025     06/18/2025     Lab Results   Component Value Date    HGBA1C 5.90 (H) 04/03/2018            Diagnoses and all orders for this visit:    1. Essential hypertension (Primary)  Overview:  Target blood pressure <130/80 mmHg    Assessment & Plan:  Pressure is borderline  If BP greater than 130/80 at next visit consider up titration of antihypertensive      2. Hyperlipidemia LDL goal <70  Overview:  High intensity statin therapy indicated given the presence of CAD    Assessment & Plan:   Continue Crestor 20 mg daily      3. Preoperative clearance  Assessment & Plan:  Patient has right bundle branch block dating back to 2022 at which time she had stress echocardiogram that was normal.  She has no new symptoms and no symptoms of high degree AV block.  She is of acceptable cardiovascular risk to proceed with bilateral mastectomy with Dr. Paul.                   Patient has breast cancer and needs bilateral mastectomy.  Her preoperative EKG is basically unchanged from prior EKG in 2022.  She had normal stress test in 2022 and a normal LVEF.  She is of acceptable cardiovascular risk to proceed with surgery as the benefits would outweigh any risk.  Return in about 6 months (around 12/24/2025), or if symptoms worsen or fail to improve.      Anastasia  Alfred BARAJAS  06/24/25  12:47 EDT

## 2025-06-24 NOTE — ASSESSMENT & PLAN NOTE
No angina symptoms  Continue aspirin 81 mg daily  Patient is of acceptable cardiovascular risk to proceed with bilateral mastectomy for breast  cancer with Dr. Paul

## 2025-06-25 ENCOUNTER — HOSPITAL ENCOUNTER (OUTPATIENT)
Facility: HOSPITAL | Age: 85
Discharge: HOME | End: 2025-06-26
Attending: SURGERY | Admitting: SURGERY
Payer: MEDICARE

## 2025-06-25 ENCOUNTER — ANESTHESIA EVENT CONVERTED (OUTPATIENT)
Dept: ANESTHESIOLOGY | Facility: HOSPITAL | Age: 85
End: 2025-06-25
Payer: MEDICARE

## 2025-06-25 ENCOUNTER — ANESTHESIA (OUTPATIENT)
Dept: PERIOP | Facility: HOSPITAL | Age: 85
End: 2025-06-25
Payer: MEDICARE

## 2025-06-25 DIAGNOSIS — Z85.3 HISTORY OF RIGHT BREAST CANCER: ICD-10-CM

## 2025-06-25 PROBLEM — C50.911 BREAST CANCER, RIGHT: Status: ACTIVE | Noted: 2025-06-25

## 2025-06-25 PROCEDURE — 25010000002 FENTANYL CITRATE (PF) 100 MCG/2ML SOLUTION: Performed by: NURSE ANESTHETIST, CERTIFIED REGISTERED

## 2025-06-25 PROCEDURE — 25010000002 CEFAZOLIN PER 500 MG: Performed by: SURGERY

## 2025-06-25 PROCEDURE — 25010000002 PROPOFOL 10 MG/ML EMULSION: Performed by: NURSE ANESTHETIST, CERTIFIED REGISTERED

## 2025-06-25 PROCEDURE — 25810000003 LACTATED RINGERS PER 1000 ML: Performed by: ANESTHESIOLOGY

## 2025-06-25 PROCEDURE — 25010000002 LIDOCAINE PF 1% 1 % SOLUTION: Performed by: NURSE ANESTHETIST, CERTIFIED REGISTERED

## 2025-06-25 PROCEDURE — A9270 NON-COVERED ITEM OR SERVICE: HCPCS | Performed by: SURGERY

## 2025-06-25 PROCEDURE — 94660 CPAP INITIATION&MGMT: CPT

## 2025-06-25 PROCEDURE — 88307 TISSUE EXAM BY PATHOLOGIST: CPT | Performed by: SURGERY

## 2025-06-25 PROCEDURE — 63710000001 ACETAMINOPHEN EXTRA STRENGTH 500 MG TABLET: Performed by: SURGERY

## 2025-06-25 PROCEDURE — 25010000002 LIDOCAINE PF 1% 1 % SOLUTION: Performed by: ANESTHESIOLOGY

## 2025-06-25 PROCEDURE — 25010000002 DEXAMETHASONE PER 1 MG: Performed by: NURSE ANESTHETIST, CERTIFIED REGISTERED

## 2025-06-25 PROCEDURE — 25010000002 ONDANSETRON PER 1 MG

## 2025-06-25 PROCEDURE — 88305 TISSUE EXAM BY PATHOLOGIST: CPT | Performed by: SURGERY

## 2025-06-25 PROCEDURE — 25010000002 DEXAMETHASONE SODIUM PHOSPHATE 10 MG/ML SOLUTION: Performed by: ANESTHESIOLOGY

## 2025-06-25 PROCEDURE — 25010000002 ONDANSETRON PER 1 MG: Performed by: NURSE ANESTHETIST, CERTIFIED REGISTERED

## 2025-06-25 PROCEDURE — 25010000002 SUGAMMADEX 200 MG/2ML SOLUTION: Performed by: NURSE ANESTHETIST, CERTIFIED REGISTERED

## 2025-06-25 PROCEDURE — 25010000002 BUPIVACAINE (PF) 0.25 % SOLUTION: Performed by: ANESTHESIOLOGY

## 2025-06-25 PROCEDURE — 94799 UNLISTED PULMONARY SVC/PX: CPT

## 2025-06-25 DEVICE — SEAL HEMO SURG ARISTA/AH ABS/PWDR 5GM: Type: IMPLANTABLE DEVICE | Site: BREAST | Status: FUNCTIONAL

## 2025-06-25 DEVICE — LIGACLIP MCA MULTIPLE CLIP APPLIERS, 20 MEDIUM CLIPS
Type: IMPLANTABLE DEVICE | Site: BREAST | Status: FUNCTIONAL
Brand: LIGACLIP

## 2025-06-25 RX ORDER — HYDRALAZINE HYDROCHLORIDE 20 MG/ML
5 INJECTION INTRAMUSCULAR; INTRAVENOUS
Status: DISCONTINUED | OUTPATIENT
Start: 2025-06-25 | End: 2025-06-25 | Stop reason: HOSPADM

## 2025-06-25 RX ORDER — PROMETHAZINE HYDROCHLORIDE 12.5 MG/1
6.25 TABLET ORAL EVERY 6 HOURS PRN
Status: DISCONTINUED | OUTPATIENT
Start: 2025-06-25 | End: 2025-06-26 | Stop reason: HOSPADM

## 2025-06-25 RX ORDER — DEXAMETHASONE SODIUM PHOSPHATE 10 MG/ML
INJECTION, SOLUTION INTRAMUSCULAR; INTRAVENOUS
Status: COMPLETED | OUTPATIENT
Start: 2025-06-25 | End: 2025-06-25

## 2025-06-25 RX ORDER — SODIUM CHLORIDE, SODIUM LACTATE, POTASSIUM CHLORIDE, CALCIUM CHLORIDE 600; 310; 30; 20 MG/100ML; MG/100ML; MG/100ML; MG/100ML
9 INJECTION, SOLUTION INTRAVENOUS CONTINUOUS
Status: ACTIVE | OUTPATIENT
Start: 2025-06-26 | End: 2025-06-26

## 2025-06-25 RX ORDER — SODIUM CHLORIDE 0.9 % (FLUSH) 0.9 %
10 SYRINGE (ML) INJECTION AS NEEDED
Status: DISCONTINUED | OUTPATIENT
Start: 2025-06-25 | End: 2025-06-25 | Stop reason: HOSPADM

## 2025-06-25 RX ORDER — PREGABALIN 75 MG/1
75 CAPSULE ORAL ONCE
Status: COMPLETED | OUTPATIENT
Start: 2025-06-25 | End: 2025-06-25

## 2025-06-25 RX ORDER — CYCLOBENZAPRINE HCL 10 MG
10 TABLET ORAL EVERY 8 HOURS PRN
Status: DISCONTINUED | OUTPATIENT
Start: 2025-06-25 | End: 2025-06-26 | Stop reason: HOSPADM

## 2025-06-25 RX ORDER — ONDANSETRON 2 MG/ML
4 INJECTION INTRAMUSCULAR; INTRAVENOUS ONCE AS NEEDED
Status: COMPLETED | OUTPATIENT
Start: 2025-06-25 | End: 2025-06-25

## 2025-06-25 RX ORDER — ONDANSETRON 2 MG/ML
INJECTION INTRAMUSCULAR; INTRAVENOUS
Status: COMPLETED
Start: 2025-06-25 | End: 2025-06-25

## 2025-06-25 RX ORDER — SODIUM CHLORIDE 0.9 % (FLUSH) 0.9 %
3-10 SYRINGE (ML) INJECTION AS NEEDED
Status: DISCONTINUED | OUTPATIENT
Start: 2025-06-25 | End: 2025-06-25 | Stop reason: HOSPADM

## 2025-06-25 RX ORDER — DROPERIDOL 2.5 MG/ML
0.62 INJECTION, SOLUTION INTRAMUSCULAR; INTRAVENOUS ONCE AS NEEDED
Status: DISCONTINUED | OUTPATIENT
Start: 2025-06-25 | End: 2025-06-25 | Stop reason: HOSPADM

## 2025-06-25 RX ORDER — HYDROCODONE BITARTRATE AND ACETAMINOPHEN 7.5; 325 MG/1; MG/1
1 TABLET ORAL EVERY 4 HOURS PRN
Status: DISCONTINUED | OUTPATIENT
Start: 2025-06-25 | End: 2025-06-25 | Stop reason: HOSPADM

## 2025-06-25 RX ORDER — FAMOTIDINE 20 MG/1
20 TABLET, FILM COATED ORAL ONCE
Status: COMPLETED | OUTPATIENT
Start: 2025-06-25 | End: 2025-06-25

## 2025-06-25 RX ORDER — LIDOCAINE HYDROCHLORIDE 10 MG/ML
INJECTION, SOLUTION EPIDURAL; INFILTRATION; INTRACAUDAL; PERINEURAL AS NEEDED
Status: DISCONTINUED | OUTPATIENT
Start: 2025-06-25 | End: 2025-06-25 | Stop reason: SURG

## 2025-06-25 RX ORDER — ONDANSETRON 2 MG/ML
INJECTION INTRAMUSCULAR; INTRAVENOUS AS NEEDED
Status: DISCONTINUED | OUTPATIENT
Start: 2025-06-25 | End: 2025-06-25 | Stop reason: SURG

## 2025-06-25 RX ORDER — SODIUM CHLORIDE, SODIUM LACTATE, POTASSIUM CHLORIDE, CALCIUM CHLORIDE 600; 310; 30; 20 MG/100ML; MG/100ML; MG/100ML; MG/100ML
9 INJECTION, SOLUTION INTRAVENOUS CONTINUOUS
Status: DISCONTINUED | OUTPATIENT
Start: 2025-06-25 | End: 2025-06-26 | Stop reason: HOSPADM

## 2025-06-25 RX ORDER — IPRATROPIUM BROMIDE AND ALBUTEROL SULFATE 2.5; .5 MG/3ML; MG/3ML
3 SOLUTION RESPIRATORY (INHALATION) ONCE AS NEEDED
Status: DISCONTINUED | OUTPATIENT
Start: 2025-06-25 | End: 2025-06-25 | Stop reason: HOSPADM

## 2025-06-25 RX ORDER — HEPARIN SODIUM 5000 [USP'U]/ML
5000 INJECTION, SOLUTION INTRAVENOUS; SUBCUTANEOUS EVERY 8 HOURS SCHEDULED
Status: DISCONTINUED | OUTPATIENT
Start: 2025-06-26 | End: 2025-06-26 | Stop reason: HOSPADM

## 2025-06-25 RX ORDER — PROMETHAZINE HYDROCHLORIDE 25 MG/1
25 SUPPOSITORY RECTAL ONCE AS NEEDED
Status: DISCONTINUED | OUTPATIENT
Start: 2025-06-25 | End: 2025-06-25 | Stop reason: HOSPADM

## 2025-06-25 RX ORDER — FENTANYL CITRATE 50 UG/ML
50 INJECTION, SOLUTION INTRAMUSCULAR; INTRAVENOUS
Status: DISCONTINUED | OUTPATIENT
Start: 2025-06-25 | End: 2025-06-25 | Stop reason: HOSPADM

## 2025-06-25 RX ORDER — DEXAMETHASONE SODIUM PHOSPHATE 4 MG/ML
INJECTION, SOLUTION INTRA-ARTICULAR; INTRALESIONAL; INTRAMUSCULAR; INTRAVENOUS; SOFT TISSUE AS NEEDED
Status: DISCONTINUED | OUTPATIENT
Start: 2025-06-25 | End: 2025-06-25 | Stop reason: SURG

## 2025-06-25 RX ORDER — ROCURONIUM BROMIDE 10 MG/ML
INJECTION, SOLUTION INTRAVENOUS AS NEEDED
Status: DISCONTINUED | OUTPATIENT
Start: 2025-06-25 | End: 2025-06-25 | Stop reason: SURG

## 2025-06-25 RX ORDER — PROMETHAZINE HYDROCHLORIDE 25 MG/1
25 TABLET ORAL ONCE AS NEEDED
Status: DISCONTINUED | OUTPATIENT
Start: 2025-06-25 | End: 2025-06-25 | Stop reason: HOSPADM

## 2025-06-25 RX ORDER — LABETALOL HYDROCHLORIDE 5 MG/ML
5 INJECTION, SOLUTION INTRAVENOUS
Status: DISCONTINUED | OUTPATIENT
Start: 2025-06-25 | End: 2025-06-25 | Stop reason: HOSPADM

## 2025-06-25 RX ORDER — HYDROMORPHONE HYDROCHLORIDE 1 MG/ML
0.5 INJECTION, SOLUTION INTRAMUSCULAR; INTRAVENOUS; SUBCUTANEOUS
Status: DISCONTINUED | OUTPATIENT
Start: 2025-06-25 | End: 2025-06-25 | Stop reason: HOSPADM

## 2025-06-25 RX ORDER — LIDOCAINE HYDROCHLORIDE 10 MG/ML
0.5 INJECTION, SOLUTION EPIDURAL; INFILTRATION; INTRACAUDAL; PERINEURAL ONCE AS NEEDED
Status: COMPLETED | OUTPATIENT
Start: 2025-06-25 | End: 2025-06-25

## 2025-06-25 RX ORDER — HYDROCODONE BITARTRATE AND ACETAMINOPHEN 5; 325 MG/1; MG/1
1 TABLET ORAL ONCE AS NEEDED
Status: DISCONTINUED | OUTPATIENT
Start: 2025-06-25 | End: 2025-06-25 | Stop reason: HOSPADM

## 2025-06-25 RX ORDER — DIPHENHYDRAMINE HYDROCHLORIDE 50 MG/ML
12.5 INJECTION, SOLUTION INTRAMUSCULAR; INTRAVENOUS EVERY 6 HOURS PRN
Status: DISCONTINUED | OUTPATIENT
Start: 2025-06-25 | End: 2025-06-26 | Stop reason: HOSPADM

## 2025-06-25 RX ORDER — PROPOFOL 10 MG/ML
VIAL (ML) INTRAVENOUS AS NEEDED
Status: DISCONTINUED | OUTPATIENT
Start: 2025-06-25 | End: 2025-06-25 | Stop reason: SURG

## 2025-06-25 RX ORDER — ONDANSETRON 2 MG/ML
4 INJECTION INTRAMUSCULAR; INTRAVENOUS EVERY 6 HOURS PRN
Status: DISCONTINUED | OUTPATIENT
Start: 2025-06-25 | End: 2025-06-26 | Stop reason: HOSPADM

## 2025-06-25 RX ORDER — NALOXONE HCL 0.4 MG/ML
0.4 VIAL (ML) INJECTION AS NEEDED
Status: DISCONTINUED | OUTPATIENT
Start: 2025-06-25 | End: 2025-06-25 | Stop reason: HOSPADM

## 2025-06-25 RX ORDER — BUPIVACAINE HYDROCHLORIDE 2.5 MG/ML
INJECTION, SOLUTION EPIDURAL; INFILTRATION; INTRACAUDAL; PERINEURAL
Status: COMPLETED | OUTPATIENT
Start: 2025-06-25 | End: 2025-06-25

## 2025-06-25 RX ORDER — SODIUM CHLORIDE 9 MG/ML
9 INJECTION, SOLUTION INTRAVENOUS AS NEEDED
Status: DISCONTINUED | OUTPATIENT
Start: 2025-06-25 | End: 2025-06-25 | Stop reason: HOSPADM

## 2025-06-25 RX ORDER — SODIUM CHLORIDE 0.9 % (FLUSH) 0.9 %
3 SYRINGE (ML) INJECTION EVERY 12 HOURS SCHEDULED
Status: DISCONTINUED | OUTPATIENT
Start: 2025-06-25 | End: 2025-06-25 | Stop reason: HOSPADM

## 2025-06-25 RX ORDER — EPHEDRINE SULFATE 50 MG/ML
INJECTION INTRAVENOUS AS NEEDED
Status: DISCONTINUED | OUTPATIENT
Start: 2025-06-25 | End: 2025-06-25 | Stop reason: SURG

## 2025-06-25 RX ORDER — SODIUM CHLORIDE 0.9 % (FLUSH) 0.9 %
10 SYRINGE (ML) INJECTION EVERY 12 HOURS SCHEDULED
Status: DISCONTINUED | OUTPATIENT
Start: 2025-06-25 | End: 2025-06-25 | Stop reason: HOSPADM

## 2025-06-25 RX ORDER — FENTANYL CITRATE 50 UG/ML
INJECTION, SOLUTION INTRAMUSCULAR; INTRAVENOUS AS NEEDED
Status: DISCONTINUED | OUTPATIENT
Start: 2025-06-25 | End: 2025-06-25 | Stop reason: SURG

## 2025-06-25 RX ORDER — DROPERIDOL 2.5 MG/ML
0.62 INJECTION, SOLUTION INTRAMUSCULAR; INTRAVENOUS
Status: DISCONTINUED | OUTPATIENT
Start: 2025-06-25 | End: 2025-06-25 | Stop reason: HOSPADM

## 2025-06-25 RX ORDER — ACETAMINOPHEN 500 MG
1000 TABLET ORAL EVERY 6 HOURS
Status: DISCONTINUED | OUTPATIENT
Start: 2025-06-25 | End: 2025-06-26 | Stop reason: HOSPADM

## 2025-06-25 RX ORDER — PROMETHAZINE HYDROCHLORIDE 12.5 MG/1
6.25 SUPPOSITORY RECTAL EVERY 6 HOURS PRN
Status: DISCONTINUED | OUTPATIENT
Start: 2025-06-25 | End: 2025-06-26 | Stop reason: HOSPADM

## 2025-06-25 RX ORDER — FAMOTIDINE 10 MG/ML
20 INJECTION, SOLUTION INTRAVENOUS ONCE
Status: CANCELLED | OUTPATIENT
Start: 2025-06-25 | End: 2025-06-25

## 2025-06-25 RX ORDER — MIDAZOLAM HYDROCHLORIDE 1 MG/ML
0.5 INJECTION, SOLUTION INTRAMUSCULAR; INTRAVENOUS
Status: DISCONTINUED | OUTPATIENT
Start: 2025-06-25 | End: 2025-06-25 | Stop reason: HOSPADM

## 2025-06-25 RX ORDER — DEXMEDETOMIDINE HYDROCHLORIDE 100 UG/ML
INJECTION, SOLUTION INTRAVENOUS AS NEEDED
Status: DISCONTINUED | OUTPATIENT
Start: 2025-06-25 | End: 2025-06-25 | Stop reason: SURG

## 2025-06-25 RX ADMIN — ACETAMINOPHEN 1000 MG: 500 TABLET ORAL at 23:25

## 2025-06-25 RX ADMIN — DEXMEDETOMIDINE HYDROCHLORIDE 4 MCG: 100 INJECTION, SOLUTION INTRAVENOUS at 14:13

## 2025-06-25 RX ADMIN — DEXAMETHASONE SODIUM PHOSPHATE 4 MG: 10 INJECTION INTRAMUSCULAR; INTRAVENOUS at 12:56

## 2025-06-25 RX ADMIN — EPHEDRINE SULFATE 5 MG: 50 INJECTION INTRAVENOUS at 15:14

## 2025-06-25 RX ADMIN — ROCURONIUM 20 MG: 50 INJECTION, SOLUTION INTRAVENOUS at 13:49

## 2025-06-25 RX ADMIN — DEXMEDETOMIDINE HYDROCHLORIDE 4 MCG: 100 INJECTION, SOLUTION INTRAVENOUS at 14:27

## 2025-06-25 RX ADMIN — PROPOFOL 50 MG: 10 INJECTION, EMULSION INTRAVENOUS at 14:31

## 2025-06-25 RX ADMIN — ACETAMINOPHEN 1000 MG: 500 TABLET ORAL at 18:22

## 2025-06-25 RX ADMIN — ROCURONIUM 20 MG: 50 INJECTION, SOLUTION INTRAVENOUS at 14:26

## 2025-06-25 RX ADMIN — FAMOTIDINE 20 MG: 20 TABLET, FILM COATED ORAL at 10:59

## 2025-06-25 RX ADMIN — LIDOCAINE HYDROCHLORIDE 50 MG: 10 INJECTION, SOLUTION EPIDURAL; INFILTRATION; INTRACAUDAL; PERINEURAL at 12:52

## 2025-06-25 RX ADMIN — SODIUM CHLORIDE 2000 MG: 900 INJECTION INTRAVENOUS at 12:58

## 2025-06-25 RX ADMIN — DEXMEDETOMIDINE HYDROCHLORIDE 4 MCG: 100 INJECTION, SOLUTION INTRAVENOUS at 14:06

## 2025-06-25 RX ADMIN — FENTANYL CITRATE 50 MCG: 50 INJECTION, SOLUTION INTRAMUSCULAR; INTRAVENOUS at 13:28

## 2025-06-25 RX ADMIN — DEXMEDETOMIDINE HYDROCHLORIDE 4 MCG: 100 INJECTION, SOLUTION INTRAVENOUS at 14:21

## 2025-06-25 RX ADMIN — PREGABALIN 75 MG: 75 CAPSULE ORAL at 10:58

## 2025-06-25 RX ADMIN — PROPOFOL 150 MG: 10 INJECTION, EMULSION INTRAVENOUS at 12:52

## 2025-06-25 RX ADMIN — ONDANSETRON 4 MG: 2 INJECTION INTRAMUSCULAR; INTRAVENOUS at 15:17

## 2025-06-25 RX ADMIN — SUGAMMADEX 200 MG: 100 INJECTION, SOLUTION INTRAVENOUS at 15:21

## 2025-06-25 RX ADMIN — ONDANSETRON 4 MG: 2 INJECTION INTRAMUSCULAR; INTRAVENOUS at 16:13

## 2025-06-25 RX ADMIN — LIDOCAINE HYDROCHLORIDE 0.5 ML: 10 INJECTION, SOLUTION EPIDURAL; INFILTRATION; INTRACAUDAL; PERINEURAL at 10:59

## 2025-06-25 RX ADMIN — SODIUM CHLORIDE, POTASSIUM CHLORIDE, SODIUM LACTATE AND CALCIUM CHLORIDE 9 ML/HR: 600; 310; 30; 20 INJECTION, SOLUTION INTRAVENOUS at 11:22

## 2025-06-25 RX ADMIN — SODIUM CHLORIDE, POTASSIUM CHLORIDE, SODIUM LACTATE AND CALCIUM CHLORIDE: 600; 310; 30; 20 INJECTION, SOLUTION INTRAVENOUS at 15:15

## 2025-06-25 RX ADMIN — ROCURONIUM 60 MG: 50 INJECTION, SOLUTION INTRAVENOUS at 12:52

## 2025-06-25 RX ADMIN — FENTANYL CITRATE 50 MCG: 50 INJECTION, SOLUTION INTRAMUSCULAR; INTRAVENOUS at 12:52

## 2025-06-25 RX ADMIN — BUPIVACAINE HYDROCHLORIDE 60 ML: 2.5 INJECTION, SOLUTION EPIDURAL; INFILTRATION; INTRACAUDAL; PERINEURAL at 12:56

## 2025-06-25 RX ADMIN — EPHEDRINE SULFATE 5 MG: 50 INJECTION INTRAVENOUS at 15:09

## 2025-06-25 RX ADMIN — PROPOFOL 50 MG: 10 INJECTION, EMULSION INTRAVENOUS at 13:28

## 2025-06-25 RX ADMIN — DEXAMETHASONE SODIUM PHOSPHATE 8 MG: 4 INJECTION, SOLUTION INTRA-ARTICULAR; INTRALESIONAL; INTRAMUSCULAR; INTRAVENOUS; SOFT TISSUE at 12:52

## 2025-06-25 NOTE — ANESTHESIA PREPROCEDURE EVALUATION
Anesthesia Evaluation     NPO Solid Status: > 8 hours  NPO Liquid Status: > 8 hours           Airway   Mallampati: I  TM distance: >3 FB  Neck ROM: full  No difficulty expected  Dental    (+) upper dentures    Pulmonary    (+) ,rales  Cardiovascular   Exercise tolerance: poor (<4 METS)    NYHA Classification: III  Rhythm: regular  Rate: normal        Neuro/Psych  GI/Hepatic/Renal/Endo      Musculoskeletal     Abdominal    Substance History      OB/GYN          Other                    Anesthesia Plan    ASA 3     general with block     (Pecs blocks discussed and agreed)    Anesthetic plan, risks, benefits, and alternatives have been provided, discussed and informed consent has been obtained with: patient.    CODE STATUS:

## 2025-06-25 NOTE — ANESTHESIA PROCEDURE NOTES
Airway  Reason: elective    Date/Time: 6/25/2025 12:54 PM  Airway not difficult    General Information and Staff    Patient location during procedure: OR  CRNA/CAA: Claire Vaz CRNA    Indications and Patient Condition  Indications for airway management: airway protection    Preoxygenated: yes    Mask difficulty assessment: 2 - vent by mask + OA or adjuvant +/- NMBA    Final Airway Details    Final airway type: endotracheal airway      Successful airway: ETT  Cuffed: yes   Successful intubation technique: video laryngoscopy  Adjuncts used in placement: intubating stylet  Endotracheal tube insertion site: oral  Blade: Napier  Blade size: 3  ETT size (mm): 7.0  Cormack-Lehane Classification: grade I - full view of glottis  Placement verified by: chest auscultation and capnometry   Measured from: teeth  ETT/EBT  to teeth (cm): 21  Number of attempts at approach: 1  Assessment: lips, teeth, and gum same as pre-op and atraumatic intubation

## 2025-06-25 NOTE — ANESTHESIA PROCEDURE NOTES
Peripheral Block      Patient reassessed immediately prior to procedure    Patient location during procedure: OR  Start time: 6/25/2025 12:56 PM  Stop time: 6/25/2025 1:02 PM  Reason for block: at surgeon's request and post-op pain management  Performed by  Anesthesiologist: Que López MD  Preanesthetic Checklist  Completed: patient identified, IV checked, site marked, risks and benefits discussed, surgical consent, monitors and equipment checked, pre-op evaluation and timeout performed  Prep:  Pt Position: supine  Sterile barriers:cap, gloves, mask and washed/disinfected hands  Prep: ChloraPrep  Patient monitoring: blood pressure monitoring, continuous pulse oximetry and EKG  Procedure  Performed under: general  Guidance:ultrasound guided and landmark technique  Images:still images obtained, printed/placed on chart    Laterality:Bilateral  Block Type:PECS I and PECS II  Injection Technique:single-shot  Needle Type:short-bevel  Needle Gauge:20 G  Resistance on Injection: none    Medications Used: bupivacaine PF (MARCAINE) 0.25 % injection - Injection   60 mL - 6/25/2025 12:56:00 PM  dexamethasone sodium phosphate injection - Injection   4 mg - 6/25/2025 12:56:00 PM      Medications  Preservative Free Saline:10ml  Comment:Block Injection:  Total volume of LA divided between Right and Left sided blocks         Post Assessment  Injection Assessment: negative aspiration for heme, incremental injection and no paresthesia on injection  Patient Tolerance:comfortable throughout block  Complications:no  Additional Notes  Interpectoral-Pectoserratus Plane   A high-frequency linear transducer, with sterile cover, was placed medial to the coracoid process in the paramedian sagittal plane. The transducer was moved caudally to the 4th rib and rotated slightly to allow an in-plane needle trajectory from medial to lateral. Pectoralis Major Muscle (PMM), Pectoralis Minor Muscle (PmM), Thoracoacromial Artery, Ribs, and Pleura  "were identified under ultrasound. The insertion site was prepped in sterile fashion and then localized with 2-5 ml of 1% Lidocaine. Using ultrasound-guidance, a 20-gauge B-Alfaro 4\" Ultraplex 360 non-stimulating echogenic needle was advanced in plane until the tip of the needle was in the fascial plane between the PMM and PmM, lateral to the Thoracoacromial Artery. 1-3ml of preservative free normal saline was used to hydro-dissect the fascial planes. After the fascial plane was verified, 10ml local anesthetic (LA) was injected for Interpectoral fascial plane block. The needle was continued along the same path to the level of the 4th rib below PmM.  Initially preservative free normal saline was used to confirm needle position and then 20 ml of LA was injected for Pectoserratus fascial plane block. Aspiration every 5 ml to prevent intravascular injection. Injection was completed with negative aspiration of blood and negative intravascular injection. Injection pressures were normal with minimal resistance.     Performed by: Claire Vaz CRNA            "

## 2025-06-25 NOTE — INTERVAL H&P NOTE
Pre-Op H&P (See Recent Office Note Attached for Full H&P)    History and physical note from office reviewed and updated with the following, otherwise there are no changes in H&P:      Review of Systems:  General ROS:  no fever, chills, rashes.  No change since last office visit.  No recent sick exposure  Cardiovascular ROS: no chest pain or dyspnea on exertion  Respiratory ROS: no cough, shortness of breath, or wheezing    Immunization History:   Immunization History   Administered Date(s) Administered    Arexvy (RSV, Adults 60+ yrs) 09/18/2023    COVID-19 (MODERNA) 1st,2nd,3rd Dose Monovalent 01/29/2021, 02/23/2021, 10/28/2021    COVID-19 (MODERNA) BIVALENT 12+YRS 10/27/2022    COVID-19 (MODERNA) Monovalent Original Booster 10/27/2022    COVID-19 (UNSPECIFIED) 02/21/2021    FLUAD TRI 65YR+ 09/11/2019    Fluad Quad 65+ 09/03/2020, 09/02/2021, 09/08/2022, 09/18/2023    Fluzone High-Dose 65+YRS 09/06/2017, 09/19/2024    Shingrix 09/18/2023    Tdap 04/25/2022         Meds:    No current facility-administered medications on file prior to encounter.     Current Outpatient Medications on File Prior to Encounter   Medication Sig Dispense Refill    albuterol (PROVENTIL) (2.5 MG/3ML) 0.083% nebulizer solution USE 1 VIAL IN NEBULIZER EVERY 4 HOURS AS NEEDED FOR WHEEZING 540 mL 0    Ascorbic Acid (VITAMIN C ER PO) Take  by mouth. 2000 units po      aspirin 81 MG EC tablet Take 1 tablet by mouth Daily.      Cholecalciferol (Vitamin D3) 50 MCG (2000 UT) capsule Take 1 capsule by mouth Daily.      folic acid (FOLVITE) 1 MG tablet Take 1 tablet by mouth Daily.      furosemide (LASIX) 20 MG tablet TAKE 1 TABLET BY MOUTH ONCE DAILY AS NEEDED (LEG  SWELLING/SHORTNESS  OF  BREATH) 30 tablet 0    guaiFENesin (MUCINEX) 600 MG 12 hr tablet Take 2 tablets by mouth 2 (Two) Times a Day.      loratadine (CLARITIN) 10 MG tablet Take 1 tablet by mouth Daily.      montelukast (SINGULAIR) 10 MG tablet Take 1 tablet by mouth Daily As Needed  "(allergies).      Olmesartan-amLODIPine-HCTZ 40-10-12.5 MG tablet Take 1 tablet every day by oral route.      telmisartan-hydrochlorothiazide (MICARDIS HCT) 40-12.5 MG per tablet Take 1 tablet by mouth Daily.      Triamcinolone Acetonide (NASACORT AQ NA) 2 sprays into each nostril Daily.      Unable to find 1 each Every Night. Med Name: C PAP MACHINE         Vital Signs:  /71 (BP Location: Right arm, Patient Position: Lying)   Pulse 78   Temp 97.2 °F (36.2 °C) (Temporal)   Resp 16   Ht 160 cm (63\")   Wt 94.3 kg (208 lb)   SpO2 97%   BMI 36.85 kg/m²     Physical Exam:    CV:  S1S2 regular rate and rhythm, no murmur               Resp:  Clear to auscultation; respirations regular, even and unlabored    Results Review:     Lab Results   Component Value Date    WBC 10.92 (H) 06/18/2025    HGB 11.3 (L) 06/18/2025    HCT 36.1 06/18/2025    MCV 95.5 06/18/2025     06/18/2025    NEUTROABS 9.25 (H) 06/16/2023    GLUCOSE 98 06/18/2025    BUN 35.8 (H) 06/18/2025    CREATININE 1.23 (H) 06/18/2025    EGFRIFNONA 45 (L) 07/20/2020    EGFRIFAFRI 55 (L) 07/20/2020     06/18/2025    K 5.0 06/18/2025     06/18/2025    CO2 25.0 06/18/2025    MG 2.6 (H) 04/30/2022    PHOS 3.7 08/28/2015    CALCIUM 10.7 (H) 06/18/2025    ALBUMIN 3.6 06/18/2025    AST 17 06/18/2025    ALT 16 06/18/2025    BILITOT 0.3 06/18/2025    PTT 26.2 05/21/2018    INR 0.93 05/21/2018   I reviewed the patient's new clinical results.    Cancer Staging (if applicable)  Cancer Patient: yes, invasive ductal carcinoma of right breast, stage I  (T1, N0, MX) ER/MI positive, HER/annabella negative     Assessment/Plan:   invasive ductal carcinoma of right breast / TOTAL MASTECTOMY BILATERAL     Angela BLANCA Regan   6/25/2025   10:38 EDT          "

## 2025-06-25 NOTE — BRIEF OP NOTE
BREAST MASTECTOMY BILATERAL  Progress Note    America Nix  6/25/2025    Pre-op Diagnosis:      * Breast cancer, right        Post-Op Diagnosis Codes:     * Breast cancer, right     Procedure(s):      Procedure(s):  TOTAL MASTECTOMY BILATERAL              Surgeon(s):  Ken Paul MD    Anesthesia: General    Staff:   Circulator: Damaris Rice RN; Conchis Mishra RN; Debbie Mancuso RN  Physician Assistant: Lupe Vang PA-C  Scrub Person: Liyah Tobias; Uma Cohen  Nursing Assistant: Ashley Chun PCT  Assistant: Que Lucas PA  Assistant: Que Lucas PA    Estimated Blood Loss: minimal    Urine Voided: * No values recorded between 6/25/2025 12:47 PM and 6/25/2025  3:34 PM *    Specimens:                Specimens       ID Source Type Tests Collected By Collected At Frozen?    A Breast, Left Tissue TISSUE PATHOLOGY EXAM   Ken Paul MD 6/25/25 1351 No    Description: LEFT BREAST SHORT STITCH SUPERIOR, LONG LATERAL    B Breast, Left Tissue TISSUE PATHOLOGY EXAM   Ken Paul MD 6/25/25 1355 No    Description: ADDITIONAL LEFT BREAST TISSUE    C Breast, Right Tissue TISSUE PATHOLOGY EXAM   Ken Paul MD 6/25/25 1411 No    Description: RIGHT BREAST              Drains:   Closed/Suction Drain 1 Right Breast Bulb 15 Fr. (Active)       Closed/Suction Drain 2 Left Breast Bulb 15 Fr. (Active)       Findings: Bilateral breast tissue removed completely grossly      Complications: None    Assistant: Que Lucas PA  was responsible for performing the following activities: Retraction, Suction, Irrigation, Suturing, Closing, and Placing Dressing and their skilled assistance was necessary for the success of this case.    Ken Paul MD     Date: 6/25/2025  Time: 15:35 EDT

## 2025-06-25 NOTE — OP NOTE
General Surgery Operative Note    BREAST MASTECTOMY BILATERAL  Procedure Report    Patient Name:  America Nix  YOB: 1940  [id number]     Date of Surgery:  6/25/2025       Pre-op Diagnosis: Right breast cancer    Post-op Diagnosis: Right breast cancer    Procedure(s):  TOTAL MASTECTOMY BILATERAL    Surgeon: Ken Paul MD    Assistant: Que Lucas PA     Anesthesia: General    This procedure was not performed to treat breast cancer through sentinel node biopsy per patient decision       Estimated Blood Loss: minimal    Complications: None     Implants:    Implant Name Type Inv. Item Serial No.  Lot No. LRB No. Used Action   CLIPAPPLR M/ ENDO LIGACLIP 20CLP 11IN MD - [id number] Implant CLIPAPPLR M/ ENDO LIGACLIP 20CLP 11IN MD  [employer]   1 Implanted   SEAL HEMO SURG NATACHA/AH ABS/PWDR 5GM - [id number] Implant SEAL HEMO SURG NATACHA/AH ABS/PWDR 5GM  [employer] [id number] N/A 2 Implanted       Specimen:          Specimens       ID Source Type Tests Collected By Collected At Frozen?    A Breast, Left Tissue TISSUE PATHOLOGY EXAM   Ken Paul MD 6/25/25 1351 No    Description: LEFT BREAST SHORT STITCH SUPERIOR, LONG LATERAL    B Breast, Left Tissue TISSUE PATHOLOGY EXAM   Ken Paul MD 6/25/25 1355 No    Description: ADDITIONAL LEFT BREAST TISSUE    C Breast, Right Tissue TISSUE PATHOLOGY EXAM   Ken Paul MD 6/25/25 1411 No    Description: RIGHT BREAST                Findings: Bilateral breast tissue removed completely grossly    Indications: The patient is an 85-year-old female with a history of right breast cancer.  We discussed the risks and benefits of surgical treatment options and the patient was agreeable to bilateral total mastectomy.  Informed consent was obtained.    Description of Procedure:     After obtaining informed consent, the patient was taken to the operating room and  placed in supine position. After appropriate DVT and antibiotic prophylaxis, general anesthesia was induced.  Pec blocks were performed by anesthesia.  The chest was prepped and draped in standard sterile fashion.    Attention was first paid to the left breast.  A curvilinear incision was made around the nipple areolar complex using a 10 blade.  The dermis and subcutaneous tissue were divided using electrocautery to a depth of 5 mm.  Subcutaneous flaps were created superiorly to the clavicle, medially to the lateral border of the sternum, inferiorly to the inframammary fold, and laterally to the latissimus dorsi.  The specimen was then dissected off the anterior surface of the pectoralis major muscle, taking the fascia with the specimen.  Before final attachments were divided the specimen was marked with a silk stitch with short stitch superior and long stitch lateral.  The specimen was then removed in the body and sent to pathology as a permanent specimen.  The wound bed was copiously irrigated using water.  A 15 Upper sorbian round SHAY drain was placed through a separate stab incision and was placed between the chest wall and skin flap, secured at the skin level using a 2-0 nylon stitch.  Viktor was placed in the area of dissection. The deep dermis was reapproximated using interrupted 3-0 Vicryl.  The skin was reapproximated using a running subicular 4-0 Monocryl.    Attention was then paid to the right breast.  A curvilinear incision was made around the nipple areolar complex using a 10 blade.  The dermis and subcutaneous tissue were divided using electrocautery to a depth of 5 mm.  Subcutaneous flaps were created superiorly to the clavicle, medially to the lateral border of the sternum, inferiorly to the inframammary fold, and laterally to the latissimus dorsi.  The specimen was then dissected off the anterior surface of the pectoralis major muscle, taking the fascia with the specimen.  Before final attachments were  divided the specimen was marked with a silk stitch with short stitch superior and long stitch lateral.  The specimen was then removed in the body and sent to pathology as a permanent specimen.  The wound bed was copiously irrigated using water.  A 15 Kitt[place] round SHAY drain was placed through a separate stab incision and was placed between the chest wall and skin flap, secured at the skin level using a 2-0 nylon stitch.  Viktor was placed in the area of dissection. The deep dermis was reapproximated using interrupted 3-0 Vicryl.  The skin was reapproximated using a running subicular 4-0 Monocryl.  A dry dressing was placed over each wound site.  An Ace wrap was placed across the chest wall.    All sponge and needle counts were correct times two at the completion of the procedure. The patient recovered from anesthesia, was extubated in the operating room and was transported to the PACU in stable condition.        Assistant: Que Lucas PA  was responsible for performing the following activities: Retraction, Suction, Irrigation, Suturing, Closing, and Placing Dressing and their skilled assistance was necessary for the success of this case.    Ken Paul MD     Date: 6/25/2025  Time: 15:36 EDT

## 2025-06-25 NOTE — ANESTHESIA POSTPROCEDURE EVALUATION
Patient: America Nix    Procedure Summary       Date: 06/25/25 Room / Location:  [facility] / [facility]    Anesthesia Start: 1246 Anesthesia Stop: 1547    Procedure: TOTAL MASTECTOMY BILATERAL (Bilateral: Breast) Diagnosis: Breast cancer, right    Surgeons: Ken Paul MD Provider: Danna Villanueva MD    Anesthesia Type: general with block ASA Status: 3            Anesthesia Type: general with block    Vitals  Vitals Value Taken Time   /69 06/25/25 15:40   Temp     Pulse 73 06/25/25 15:47   Resp     SpO2 92 % 06/25/25 15:47   Vitals shown include unfiled device data.        Post Anesthesia Care and Evaluation    Patient location during evaluation: PACU  Patient participation: waiting for patient participation  Level of consciousness: sleepy but conscious  Pain management: adequate    Airway patency: patent  Anesthetic complications: No anesthetic complications  PONV Status: none  Cardiovascular status: hemodynamically stable and acceptable  Respiratory status: nonlabored ventilation, acceptable, nasal cannula and oral airway  Hydration status: acceptable

## 2025-06-26 ENCOUNTER — APPOINTMENT (OUTPATIENT)
Dept: MAMMOGRAPHY | Facility: HOSPITAL | Age: 85
End: 2025-06-26
Payer: MEDICARE

## 2025-06-26 VITALS
RESPIRATION RATE: 18 BRPM | TEMPERATURE: 97.8 F | WEIGHT: 208 LBS | OXYGEN SATURATION: 95 % | BODY MASS INDEX: 36.86 KG/M2 | HEART RATE: 76 BPM | DIASTOLIC BLOOD PRESSURE: 59 MMHG | HEIGHT: 63 IN | SYSTOLIC BLOOD PRESSURE: 122 MMHG

## 2025-06-26 PROCEDURE — A9270 NON-COVERED ITEM OR SERVICE: HCPCS | Performed by: SURGERY

## 2025-06-26 PROCEDURE — 94799 UNLISTED PULMONARY SVC/PX: CPT

## 2025-06-26 PROCEDURE — 76098 X-RAY EXAM SURGICAL SPECIMEN: CPT

## 2025-06-26 PROCEDURE — 63710000001 ACETAMINOPHEN EXTRA STRENGTH 500 MG TABLET: Performed by: SURGERY

## 2025-06-26 PROCEDURE — 94660 CPAP INITIATION&MGMT: CPT

## 2025-06-26 PROCEDURE — 76098 X-RAY EXAM SURGICAL SPECIMEN: CPT | Performed by: RADIOLOGY

## 2025-06-26 PROCEDURE — 25010000002 HEPARIN (PORCINE) PER 1000 UNITS: Performed by: SURGERY

## 2025-06-26 RX ADMIN — ACETAMINOPHEN 1000 MG: 500 TABLET ORAL at 11:11

## 2025-06-26 RX ADMIN — ACETAMINOPHEN 1000 MG: 500 TABLET ORAL at 05:14

## 2025-06-26 RX ADMIN — HEPARIN SODIUM 5000 UNITS: 5000 INJECTION INTRAVENOUS; SUBCUTANEOUS at 05:14

## 2025-06-26 NOTE — PLAN OF CARE
Goal Outcome Evaluation:  Plan of Care Reviewed With: patient        Progress: improving  Outcome Evaluation: Patient remains on 2L NC at baseline, VSS. Uses CPAP at night. Bilateral breast incisions with gauze and ace wrap in place. Slight swelling noted on R side of incision, no changes seen overnight. 60ml output from R SHAY drain, 105ml output from L SHAY drain noted overnight. Plan of care ongoing.

## 2025-06-26 NOTE — CASE MANAGEMENT/SOCIAL WORK
Case Management Discharge Note      Final Note: Home         Selected Continued Care - Admitted Since 6/25/2025       Destination    No services have been selected for the patient.                Durable Medical Equipment    No services have been selected for the patient.                Dialysis/Infusion    No services have been selected for the patient.                Home Medical Care    No services have been selected for the patient.                Therapy    No services have been selected for the patient.                Community Resources    No services have been selected for the patient.                Community & DME    No services have been selected for the patient.                         Final Discharge Disposition Code: 01 - home or self-care

## 2025-06-26 NOTE — NURSING NOTE
VSS. Dressings dry and intact. Tolerating reg diet. Offers no c/o pain. Discharge teaching done with pt and family. They all voiced understanding. Pt discharged via wheelchair to car.

## 2025-06-26 NOTE — DISCHARGE SUMMARY
Patient Name:  America Nix  YOB: 1940  [id number]      Date of Discharge: 6/26/2025    Discharge Diagnosis:   Right breast cancer    Problem List:  Active Hospital Problems    Diagnosis  POA    **Breast cancer, right [C50.911]  Yes      Resolved Hospital Problems   No resolved problems to display.       Presenting Problem/History of Present Illness  Breast cancer, right [C50.911]      Hospital Course  Patient is a 85 y.o. female who presented with right breast cancer.  On 6/25/2025 I performed bilateral total mastectomy. On postoperative day one, pain was well-controlled with oral pain medications. No nausea/vomiting.  No fevers/chills. Voiding urine spontaneously. Ambulating appropriately. The patient was thus found to be safe for discharge to home.           Procedures Performed    Procedure(s):  TOTAL MASTECTOMY BILATERAL  -------------------       Consults:   Consults       No orders found for last 30 day(s).            Pertinent Test Results: N/A    Condition on Discharge:  Pain controlled with oral pain medication, tolerating diet, ambulating appropriately      Vital Signs  Temp:  [96.8 °F (36 °C)-98 °F (36.7 °C)] 96.9 °F (36.1 °C)  Heart Rate:  [59-80] 59  Resp:  [14-19] 18  BP: (106-181)/(62-92) 106/62    Discharge Disposition  Home or Self Care    Discharge Medications     Discharge Medications        Continue These Medications        Instructions Start Date   albuterol (2.5 MG/3ML) 0.083% nebulizer solution  Commonly known as: PROVENTIL   Nebulization, Every 4 Hours PRN      aspirin 81 MG EC tablet   81 mg, Oral, Daily      folic acid 1 MG tablet  Commonly known as: FOLVITE   1,000 mcg, Daily      furosemide 20 MG tablet  Commonly known as: LASIX   TAKE 1 TABLET BY MOUTH ONCE DAILY AS NEEDED (LEG  SWELLING/SHORTNESS  OF  BREATH)      guaiFENesin 600 MG 12 hr tablet  Commonly known as: MUCINEX   1,200 mg, 2 Times Daily      loratadine 10 MG tablet  Commonly known as: CLARITIN   10 mg,  Daily      montelukast 10 MG tablet  Commonly known as: SINGULAIR   10 mg, Daily PRN      NASACORT AQ NA   2 sprays, Daily      Olmesartan-amLODIPine-HCTZ 40-10-12.5 MG tablet   Take 1 tablet every day by oral route.      oxybutynin XL 10 MG 24 hr tablet  Commonly known as: DITROPAN-XL       rosuvastatin 20 MG tablet  Commonly known as: CRESTOR   20 mg, Nightly      telmisartan-hydrochlorothiazide 40-12.5 MG per tablet  Commonly known as: MICARDIS HCT   1 tablet, Daily      Trelegy Ellipta 200-62.5-25 MCG/ACT inhaler  Generic drug: Fluticasone-Umeclidin-Vilant   Daily - RT      Unable to find   1 each, Nightly      VITAMIN C ER PO   Take  by mouth. 2000 units po      Vitamin D3 50 MCG (2000 UT) capsule   2,000 Units, Daily               Discharge Diet       Activity at Discharge  Activity Instructions       Discharge Activity      1) No driving for 2 weeks and no longer taking narcotics.   2) Return to school / work in 2 weeks.  3) May shower starting 6/27/2025.  No tub baths.  No swimming..  4) Do not lift / push / pull more than 15 lbs  5) Record each drain output individually daily.  Bring records to follow-up appointment with Dr. Paul.            Follow-up Appointments  Future Appointments   Date Time Provider Department Center   7/16/2025 10:00 AM Luisana Washburn MD [facility]   9/19/2025 11:00 AM [facility] - PFT [facility]   9/19/2025 11:30 AM Kita Farah APRN [facility]   1/23/2026  2:45 PM Devin Yanez IV, MD [facility]   1/28/2026  1:00 PM Bonita Mejia APRN [facility]     Additional Instructions for the Follow-ups that You Need to Schedule       Discharge Follow-up with Specified Provider: Dr. Paul; 1 Week   As directed      To: Dr. Paul   Follow Up: 1 Week   Follow Up Details: Call [phone] to make an appointment        Notify Physician or Go To The ED For the Following Conditions   As directed      Fever >100.4, increased pain,  rapid chest swelling, new redness/drainage from incision    Order Comments: Fever >100.4, increased pain, rapid chest swelling, new redness/drainage from incision                 Test Results Pending at Discharge  Pending Labs       Order Current Status    Tissue Pathology Exam In process            Time: Discharge 15 min    Ken Paul MD   06/26/25   07:06 EDT

## 2025-06-30 LAB
CYTO UR: NORMAL
LAB AP CASE REPORT: NORMAL
LAB AP CLINICAL INFORMATION: NORMAL
LAB AP DIAGNOSIS COMMENT: NORMAL
LAB AP SYNOPTIC CHECKLIST: NORMAL
PATH REPORT.FINAL DX SPEC: NORMAL
PATH REPORT.GROSS SPEC: NORMAL

## 2025-07-11 ENCOUNTER — TRANSCRIBE ORDERS (OUTPATIENT)
Dept: PHYSICAL THERAPY | Facility: HOSPITAL | Age: 85
End: 2025-07-11
Payer: MEDICARE

## 2025-07-11 DIAGNOSIS — C50.111 MALIGNANT NEOPLASM OF CENTRAL PORTION OF RIGHT FEMALE BREAST, UNSPECIFIED ESTROGEN RECEPTOR STATUS: Primary | ICD-10-CM

## 2025-07-14 ENCOUNTER — HOSPITAL ENCOUNTER (OUTPATIENT)
Dept: PHYSICAL THERAPY | Facility: HOSPITAL | Age: 85
Setting detail: THERAPIES SERIES
Discharge: HOME | End: 2025-07-14
Payer: MEDICARE

## 2025-07-14 ENCOUNTER — LAB (OUTPATIENT)
Dept: LAB | Facility: HOSPITAL | Age: 85
End: 2025-07-14
Payer: MEDICARE

## 2025-07-14 DIAGNOSIS — T81.31XD SURGICAL WOUND DEHISCENCE, SUBSEQUENT ENCOUNTER: Primary | ICD-10-CM

## 2025-07-14 DIAGNOSIS — S21.009D: ICD-10-CM

## 2025-07-14 DIAGNOSIS — Z90.13 H/O BILATERAL MASTECTOMY: ICD-10-CM

## 2025-07-14 PROCEDURE — 87186 SC STD MICRODIL/AGAR DIL: CPT | Performed by: SURGERY

## 2025-07-14 PROCEDURE — 87070 CULTURE OTHR SPECIMN AEROBIC: CPT | Performed by: SURGERY

## 2025-07-14 PROCEDURE — 97162 PT EVAL MOD COMPLEX 30 MIN: CPT

## 2025-07-14 PROCEDURE — 87075 CULTR BACTERIA EXCEPT BLOOD: CPT | Performed by: SURGERY

## 2025-07-14 PROCEDURE — 97597 DBRDMT OPN WND 1ST 20 CM/<: CPT

## 2025-07-14 PROCEDURE — 87147 CULTURE TYPE IMMUNOLOGIC: CPT | Performed by: SURGERY

## 2025-07-14 PROCEDURE — 87205 SMEAR GRAM STAIN: CPT | Performed by: SURGERY

## 2025-07-14 NOTE — THERAPY EVALUATION
Outpatient Rehabilitation - Wound/Debridement Initial Eval   Kenna     Patient Name: America Nix  : 1940  MRN: [id number]  Today's Date: 2025                  Admit Date: 2025    Visit Dx:    ICD-10-CM ICD-9-CM   1. Surgical wound dehiscence, subsequent encounter  T81.31XD V58.89     998.32   2. H/O bilateral mastectomy  Z90.13 V45.71   3. 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    Coronary artery disease involving native coronary artery of native heart with angina pectoris    Dyspnea on exertion    Preoperative clearance    Right bundle branch block    Breast cancer, right        Past Medical History:   Diagnosis Date    JOSH (acute kidney injury) 06/10/2022    Arthritis     COPD (chronic obstructive pulmonary disease)     Emphysema lung     GERD (gastroesophageal reflux disease)     Hypercholesteremia     Hypertension     Lung cancer 2015    LEFT LUNG LOBECTOMY     Lung cancer 2017    RIGHT     On home O2     2.5 L HS, 2L during day when needed    Pneumonia     Pneumothorax 2015    AFTER LUNG BIOPSY     PONV (postoperative nausea and vomiting)     Seasonal allergies     Sleep apnea     CPAP COMPLIANT    Wears dentures     Wears glasses         Past Surgical History:   Procedure Laterality Date    ADRENALECTOMY Left 2018    Procedure: ADRENALECTOMY LAPAROSCOPIC LEFT;  Surgeon: Carol Ann Guzmán MD;  Location: [facility] OR;  Service: General    BREAST BIOPSY Bilateral     Bilateral biopsies years ago- Both were benign per patient    BRONCHOSCOPY N/A 2017    Procedure: BRONCHOSCOPY;  Surgeon: Jeremy Herrera MD;  Location: [facility] OR;  Service:     CHEST TUBE INSERTION       LEFT LUNG AFTER LUNG BIOPSY D/T PNEUMOTHORAX     COLONOSCOPY  2015    GOLD SEED FIDUCIAL PLACEMENT  05/21/2018    left adrenal gland    HYSTERECTOMY  1968    Complete Hysterectomy at age 28    LUNG BIOPSY Bilateral     LEFT IN 2015, RIGHT IN 2017     LUNG LOBECTOMY Left 08/2015    YELENA PER DR HERRERA     MASTECTOMY Bilateral 6/25/2025    Procedure: TOTAL MASTECTOMY BILATERAL;  Surgeon: Ken Paul MD;  Location: [facility] OR;  Service: General;  Laterality: Bilateral;    TEETH EXTRACTION      THORACOSCOPY VIDEO ASSISTED WITH LOBECTOMY Right 11/30/2017    Procedure: THORACOSCOPY VIDEO ASSISTED WITH RIGHT UPPER LOBE WEDGE RESECTION AND COMPLETION OF RIGHT UPPER LOBECTOMY, MEDIASTINAL LYMPH NODE DISSECTION AND INTERCOSTAL NERVE BLOCKS;  Surgeon: Jeremy Herrera MD;  Location: [facility] OR;  Service:         Patient History       Row Name 07/14/25 1448             History    Chief Complaint Ulcer, wound or other skin conditions  -      Brief Description of Current Complaint Pt is s/p B mastectomy 6/25/25 now presenting with surgical wound dehiscence.  Pt was seen in MD office today and had debridement of wounds in office, referred for potential wound vac.  -      Patient/Caregiver Goals Heal wound;Return to prior level of function  -      Patient seeing anyone else for problem(s)? Ken Pual MD ([facility])  -      How has patient tried to help current problem? BID gauze packing, washing with soap/water  -         Pain     Pain at Present 0  -         Services    Are you currently receiving Home Health services No  -                User Key  (r) = Recorded By, (t) = Taken By, (c) = Cosigned By      Initials Name Provider Type    Miracle Guzmán, PT Physical Therapist                    EVALUATION   PT Ortho       Row Name 07/14/25 8163       Subjective    Subjective Comments Pt reports wound areas are mostly numb.  States she has had a lot of drainage from the left breast and some foul  odor noted.  Pt states her cousin has been helping her with dressing changes since her  has dementia.  -       Subjective Pain    Able to rate subjective pain? yes  -    Pre-Treatment Pain Level 0  -    Post-Treatment Pain Level 0  -JM       Transfers    Sit-Stand [place]ar[place] (Transfers) independent  -JM    Stand-Sit [place] (Transfers) independent  -JM    Comment, (Transfers) reclined for tx  -JM       Gait/Stairs (Locomotion)    [place] Level (Gait) modified independence  -    Assistive Device (Gait) walker, 4-wheeled  -              User Key  (r) = Recorded By, (t) = Taken By, (c) = Cosigned By      Initials Name Provider Type    Miracle Guzmán PT Physical Therapist                   LDA Wound       Row Name 07/14/25 1445             Wound 07/14/25 1445 Left breast Surgical Dehisced    Wound - Properties Group Placement Date: 07/14/25  - Placement Time: 1445  -JM Side: Left  - Location: breast  - Primary Wound Type: Surgical  - Secondary Wound Type - Surgical: Dehisced  -    Wound Image Images linked: 2  -JM      Dressing Appearance moist drainage  -      Dressing Removed Type gauze, [id number]  -      Confirmed Empty Wound Bed Yes, finger sweep performed;Yes, visual inspection of wound bed  -      Base moist;granulating;red;necrotic;subcutaneous;yellow;eschar  -      Periwound intact;indurated;redness  -      Periwound Temperature warm  -      Periwound Skin Turgor firm  -      Edges irregular;open  -JM      Wound Length (cm) 2.5 cm  -      Wound Width (cm) 12.5 cm  -      Wound Depth (cm) 4 cm  -      Wound Surface Area (cm^2) 24.54 cm^2  -JM      Wound Volume (cm^3) 65.45 cm^3  -JM      Drainage Characteristics/Odor serosanguineous;malodorous  -      Drainage Amount large  wound cultures obtained  -      Care, Wound irrigated with;wound cleanser;debrided  -      Dressing Care dressing applied;gauze, [id number]-sv-hjgl;abdominal pad   vashe-soaked conform, ABD  -JM      Periwound Care cleansed with pH balanced cleanser;dry periwound area maintained  -JM      Retired Wound - Properties Group Placement Date: 07/14/25  -JM Placement Time: 1445  -JM Side: Left  -JM Location: breast  -JM    Retired Wound - Properties Group Placement Date: 07/14/25  - Placement Time: 1445  -JM Side: Left  -JM Location: breast  -JM    Retired Wound - Properties Group Date first assessed: 07/14/25  -JM Time first assessed: 1445  -JM Side: Left  -JM Location: breast  -JM       Wound 07/14/25 1445 Right breast Surgical Dehisced    Wound - Properties Group Placement Date: 07/14/25  -JM Placement Time: 1445 -JM Side: Right  -JM Location: breast  -JM Primary Wound Type: Surgical  - Secondary Wound Type - Surgical: Dehisced  -JM    Wound Image Images linked: 2  -JM      Dressing Appearance intact;moist drainage  -      Dressing Removed Type gauze, glof-zi-hrga  -      Confirmed Empty Wound Bed Yes, finger sweep performed;Yes, visual inspection of wound bed  -      Base moist;granulating;red;subcutaneous;yellow;slough;eschar  -      Periwound intact;dry;indurated;redness  -      Periwound Temperature warm  -      Periwound Skin Turgor soft  -      Edges irregular;open  -      Wound Length (cm) 3.5 cm  -      Wound Width (cm) 10.2 cm  -      Wound Depth (cm) 2.2 cm  -      Wound Surface Area (cm^2) 28.04 cm^2  -      Wound Volume (cm^3) 41.123 cm^3  -      Tunneling [Depth (cm)/Location] 4.6cm@12:00  -      Drainage Characteristics/Odor serosanguineous  -      Drainage Amount moderate  -      Care, Wound cleansed with;wound cleanser;debrided  -      Dressing Care dressing applied;gauze, [id number]-xd-hcyk;abdominal pad  vashe-moistened conform, ABD  -JM      Periwound Care cleansed with pH balanced cleanser;dry periwound area maintained  -JM      Retired Wound - Properties Group Placement Date: 07/14/25  - Placement Time: 1445  -JM Side: Right   - Location: breast  -JM    Retired Wound - Properties Group Placement Date: 07/14/25  - Placement Time: 1445  -JM Side: Right  -JM Location: breast  -JM    Retired Wound - Properties Group Date first assessed: 07/14/25  - Time first assessed: 1445  -JM Side: Right  -JM Location: breast  -JM              User Key  (r) = Recorded By, (t) = Taken By, (c) = Cosigned By      Initials Name Provider Type    Miracle Guzmán, PT Physical Therapist                      WOUND DEBRIDEMENT  Total area of Debridement: 10cmsq  Debridement Site 1  Location- Site 1: R breast wound  Selective Debridement- Site 1: Wound Surface <20cmsq  Instruments- Site 1: #15, scapel, tweezers  Excised Tissue Description- Site 1: moderate, slough, eschar, necrotic, adipose  Bleeding- Site 1: seeping, held pressure, 1 minute   Debridement Site 2  Location- Site 2: L breast wound  Selective Debridement- Site 2: Wound Surface <20cmsq  Instruments- Site 2: #15, scapel, tweezers  Excised Tissue Description- Site 2: moderate, slough, eschar, necrotic, adipose  Bleeding- Site 2: seeping, held pressure, 1 minute          Therapy Education       Row Name 07/14/25 1447             Therapy Education    Education Details Continue daily packing changes using vashe-soaked gauze roll and ABD.  Plan for wound vac when approved by insurance.  -ALEYDA      Given Symptoms/condition management;Bandaging/dressing change  -      Program New  -ALEYDA      How Provided Verbal;Demonstration;Written  -ALEYDA      Provided to Patient;Other (comment)  cousin  -      Level of Understanding Verbalized;Teach back education performed  -                User Key  (r) = Recorded By, (t) = Taken By, (c) = Cosigned By      Initials Name Provider Type    Miracle Guzmán, PT Physical Therapist                    Recommendation and Plan   PT Assessment/Plan       Row Name 07/14/25 1443          PT Assessment    Functional Limitations Performance in self-care ADL;Other  (comment)  wound management limitations, limitations of cargiving for spouse  -     Impairments Integumentary integrity  -     Assessment Comments Patient presenting with open wounds to bilateral breast s/p dehiscence of mastectomy incisions.  Wound beds both with beefy red granulation but some necrotic adipose in med/lat aspects.  Minimal residual eschar at edges that MD debrided in office today.  Pt with min odor from L breast wound and heavy drainage so PT obtained wound cultures and will send to lab per MD VO.  Patient will benefit from wound vac to B breast wounds to help faciliate granulation and closure and manage drainage to reduce risk nonhealing or infection.  Pt with clear wound margins on tissue pathology report, pt is non-diabetic so should respond well to wound vac.  -     Rehab Potential Good  -     Patient/caregiver participated in establishment of treatment plan and goals Yes  -     Patient would benefit from skilled therapy intervention Yes  -        PT Plan    PT Frequency 2x/week;3x/week  -     Predicted Duration of Therapy Intervention (PT) 24 visits  -     Planned CPT's? PT EVAL MOD COMPLELITY: 95616;PT HOLGER DEBRIDE OPEN WOUND UP TO 20 CM: 38167;PT HOLGER DEBRIDE OPEN WOUND EA ADD 20 CM: 22222;PT NONSELECT DEBRIDE 15 MIN: 18256;PT NEG PRESS WOUND TO 50 SQCM 3: 44774;PT NEG PRESS WOUND OVER 50 SQCM 4: 96820;PT SELF CARE/MGMT/TRAIN 15 MIN: 03658  -     Physical Therapy Interventions (Optional Details) patient/family education;wound care  -     PT Plan Comments wound vac once approved, debridement, dressings management  -               User Key  (r) = Recorded By, (t) = Taken By, (c) = Cosigned By      Initials Name Provider Type    Miracle Guzmán, PT Physical Therapist                      Goals   PT OP Goals       Row Name 07/14/25 1640 07/14/25 1445       PT Short Term Goals    STG 1 -- Patient/family to verbalize S&S of infection and when to seek medical attention.   -    STG 2 -- Reduce R breast wound dimensions by 50% as evidence of wound healing.  -    STG 3 -- Reduce L breast wound dimensions by 50% as evidence of wound healing.  -    STG 4 -- Patient independent with home wound vac management.  -       Long Term Goals    LTG 1 -- Patient/family independent with home dressing changes once wound vac discontinued.  -    LTG 2 -- Reduce R breast wound dimensions by 90% as evidence of wound healing.  -    LTG 3 -- Reduce L breast wound dimensions by 90% as evidence of wound healing.  -       Time Calculation    PT Goal Re-Cert Due Date 10/12/25  - 10/12/25  -              User Key  (r) = Recorded By, (t) = Taken By, (c) = Cosigned By      Initials Name Provider Type     Miracle Tsai, PT Physical Therapist                    Time Calculation: Start Time: 1445  Untimed Charges  PT Eval/Re-eval Minutes: 75  Wound Care: 68506 Selective debridement  84452-Rezbwyrdb debridement: 30  Total Minutes  Untimed Charges Total Minutes: 105   Total Minutes: 105  Therapy Charges for Today       Code Description Service Date Service Provider Modifiers Qty    70116134114 HC HOLGER DEBRIDE OPEN WOUND UP TO 20CM 7/14/2025 Miracle Tsai, PT GP 1    [id number] HC-PT EVAL MOD COMPLEXITY 5 7/14/2025 Miracle Tsai, PT  1                  Miracle Tsai, PT  7/14/2025

## 2025-07-16 ENCOUNTER — OFFICE VISIT (OUTPATIENT)
Dept: ONCOLOGY | Facility: CLINIC | Age: 85
End: 2025-07-16
Payer: MEDICARE

## 2025-07-16 VITALS
HEART RATE: 86 BPM | BODY MASS INDEX: 35.97 KG/M2 | WEIGHT: 203 LBS | RESPIRATION RATE: 18 BRPM | HEIGHT: 63 IN | TEMPERATURE: 97.5 F | SYSTOLIC BLOOD PRESSURE: 132 MMHG | OXYGEN SATURATION: 95 % | DIASTOLIC BLOOD PRESSURE: 64 MMHG

## 2025-07-16 DIAGNOSIS — C34.90 PRIMARY MALIGNANT NEOPLASM OF LUNG METASTATIC TO OTHER SITE, UNSPECIFIED LATERALITY: Primary | ICD-10-CM

## 2025-07-16 PROCEDURE — 99214 OFFICE O/P EST MOD 30 MIN: CPT | Performed by: INTERNAL MEDICINE

## 2025-07-16 PROCEDURE — 3078F DIAST BP <80 MM HG: CPT | Performed by: INTERNAL MEDICINE

## 2025-07-16 PROCEDURE — 1126F AMNT PAIN NOTED NONE PRSNT: CPT | Performed by: INTERNAL MEDICINE

## 2025-07-16 PROCEDURE — 3075F SYST BP GE 130 - 139MM HG: CPT | Performed by: INTERNAL MEDICINE

## 2025-07-16 RX ORDER — ASPIRIN 81 MG
1 TABLET, DELAYED RELEASE (ENTERIC COATED) ORAL DAILY
Qty: 30 TABLET | Refills: 6 | Status: SHIPPED | OUTPATIENT
Start: 2025-07-16

## 2025-07-16 RX ORDER — ANASTROZOLE 1 MG/1
1 TABLET ORAL DAILY
Qty: 30 TABLET | Refills: 6 | Status: SHIPPED | OUTPATIENT
Start: 2025-07-16

## 2025-07-16 NOTE — PROGRESS NOTES
DATE OF VISIT: 7/16/2025    REASON FOR VISIT: Followup for bilateral lung cancers     PROBLEM LIST:  1. Stage IB poorly differentiated adenocarcinoma of the lung, [id number]:  A. Diagnosed 08/03/2015 after CT-guided biopsy.  B. Status post left upper lobe resection with mediastinal lymph node sampling  done by Dr. Herrera on 08/26/2015.  C. New right upper lobe lung nodule with left adrenal nodule, both were hypermetabolic active on PET scan done on November 6, 2017  2.  Stage IB right upper lobe adenocarcinoma [id number]:  A. presented with hypermetabolic nodule on a screening PET scan.  B.  Status post surgical resection with a clean margins done by Dr. Herrera November 30 2017  C.  Final pathology revealed 1.7 adenocarcinoma with pleural involvement negative hilar and mediastinal nodes and clear surgical margins  3.  Isolated left adrenal metastases:  A.  Status post left adrenalectomy done on February 1, 2018  B. Pathology report revealed metastatic adenocarcinoma lung primary with positive margin  C. Status post CyberKnife radiation treatment completed Traci 15, 2018  4.  Right upper outer quadrant breast cancer, T1 cN0 M0 stage I:  A.  Presumed abnormal mammogram March 2025.  B.  Diagnosed after ultrasound-guided biopsy May 14, 2025  C.  Status post bilateral mastectomies done by Dr. Servin June 25, 2025  D.  Final pathology revealed 1.4 cm ductal carcinoma, clear margins, negative sentinel lymph node, ER 95% positive [place] 95% positive HER2/annabella 0 by IHC.  5.  Hypertension  6.  Hypercholesterolemia  7.  Heartburn    HISTORY OF PRESENT ILLNESS: The patient is a very pleasant 85 y.o. female with past medical history significant for bilateral lung cancers diagnosed 2015 left side and 2017 right side.  The patient had isolated relapse in the adrenal gland and she is status post resection followed by CyberKnife radiotherapy completed Traci 15, 2018. The patient is here today for scheduled follow-up visit.    SUBJECTIVE:  "America is here today with her sister-in-law.  She is anxious about her new breast cancer diagnosis.  She is having mild dry cough.    Past History:  Medical History: has a past medical history of JOSH (acute kidney injury) (06/10/2022), Arthritis, COPD (chronic obstructive pulmonary disease), Emphysema lung, GERD (gastroesophageal reflux disease), Hypercholesteremia, Hypertension, Lung cancer (2015), Lung cancer (2017), On home O2, Pneumonia, Pneumothorax (2015), PONV (postoperative nausea and vomiting), Seasonal allergies, Sleep apnea, Wears dentures, and Wears glasses.   Surgical History: has a past surgical history that includes Hysterectomy (1968); Lung lobectomy (Left, 08/2015); Lung biopsy (Bilateral); Breast biopsy (Bilateral); Chest tube insertion (2015); Colonoscopy (2015); Bronchoscopy (N/A, 11/30/2017); thoracoscopy video assisted with lobectomy (Right, 11/30/2017); Teeth Extraction; Adrenalectomy (Left, 04/06/2018); Gold Seed Fiducial Placement (05/21/2018); and Mastectomy (Bilateral, 6/25/2025).   Family History: family history includes Heart attack in her father and mother; Hypertension in her mother; Leukemia in her maternal aunt; Pneumonia in her brother.   Social History: reports that she quit smoking about 9 years ago. Her smoking use included cigarettes. She started smoking about 44 years ago. She has a 17.5 pack-year smoking history. She has been exposed to tobacco smoke. She has never used smokeless tobacco. She reports that she does not drink alcohol and does not use drugs.    (Not in a hospital admission)     Allergies: Pneumococcal vaccines, Codeine, Hydrocodone, and Sulfa antibiotics     Review of Systems   Constitutional:  Positive for fatigue.   Gastrointestinal: Negative.    Musculoskeletal:  Positive for arthralgias.       PHYSICAL EXAMINATION:   /64   Pulse 86   Temp 97.5 °F (36.4 °C)   Resp 18   Ht 160 cm (62.99\")   Wt 92.1 kg (203 lb)   SpO2 95% Comment: on 2L ox  BMI " 35.97 kg/m²    Pain Score    07/16/25 0943   PainSc: 0-No pain                        ECOG Performance Status: 1 - Symptomatic but completely ambulatory      General Appearance:      alert, cooperative, no apparent distress and appears stated age   Lungs:   Clear to auscultation bilaterally; respirations regular, even, and unlabored bilaterally   Heart:  Regular rate and rhythm, no murmurs appreciated   Abdomen:   Soft, non-tender, and non-distended                 Hospital Outpatient Visit on 07/14/2025   Component Date Value Ref Range Status    Wound Culture 07/14/2025 Scant growth (1+) Mixed Gram Negative Brigida (A)   Preliminary    Wound Culture 07/14/2025 Scant growth (1+) Gram Positive Cocci (A)   Preliminary    Gram Stain 07/14/2025 Many (4+) WBCs per low power field   Preliminary    Gram Stain 07/14/2025 Few (2+) Epithelial cells per low power field   Preliminary    Gram Stain 07/14/2025 Few (2+) Gram positive cocci   Preliminary        Mammo Breast Specimen  Result Date: 6/26/2025  Narrative: SPECIMEN RADIOGRAPH:  Whole breast specimen radiograph was submitted for localization of clips. 3 clips were identified and pin placed at each location. These were annotated and returned to the pathology department.  6/26/2025 1:37 PM by Shannon Juarez MD on Workstation: [id number]      Peripheral Block  Result Date: 6/25/2025  Narrative: Claire Vaz CRNA     6/25/2025  1:23 PM Peripheral Block Patient reassessed immediately prior to procedure Patient location during procedure: OR Start time: 6/25/2025 12:56 PM Stop time: 6/25/2025 1:02 PM Reason for block: at surgeon's request and post-op pain management Performed by Anesthesiologist: Que López MD Preanesthetic Checklist Completed: patient identified, IV checked, site marked, risks and benefits discussed, surgical consent, monitors and equipment checked, pre-op evaluation and timeout performed Prep: Pt Position: supine Sterile barriers:cap, gloves,  "mask and washed/disinfected hands Prep: ChloraPrep Patient monitoring: blood pressure monitoring, continuous pulse oximetry and EKG Procedure Performed under: general Guidance:ultrasound guided and landmark technique Images:still images obtained, printed/placed on chart Laterality:Bilateral Block Type:PECS I and PECS II Injection Technique:single-shot Needle Type:short-bevel Needle Gauge:20 G Resistance on Injection: none Medications Used: bupivacaine PF (MARCAINE) 0.25 % injection - Injection  60 mL - 6/25/2025 12:56:00 PM dexamethasone sodium phosphate injection - Injection  4 mg - 6/25/2025 12:56:00 PM Medications Preservative Free Saline:10ml Comment:Block Injection:  Total volume of LA divided between Right and Left sided blocks   Post Assessment Injection Assessment: negative aspiration for heme, incremental injection and no paresthesia on injection Patient Tolerance:comfortable throughout block Complications:no Additional Notes Interpectoral-Pectoserratus Plane A high-frequency linear transducer, with sterile cover, was placed medial to the coracoid process in the paramedian sagittal plane. The transducer was moved caudally to the 4th rib and rotated slightly to allow an in-plane needle trajectory from medial to lateral. Pectoralis Major Muscle (PMM), Pectoralis Minor Muscle (PmM), Thoracoacromial Artery, Ribs, and Pleura were identified under ultrasound. The insertion site was prepped in sterile fashion and then localized with 2-5 ml of 1% Lidocaine. Using ultrasound-guidance, a 20-gauge B-Alfaro 4\" Ultraplex 360 non-stimulating echogenic needle was advanced in plane until the tip of the needle was in the fascial plane between the PMM and PmM, lateral to the Thoracoacromial Artery. 1-3ml of preservative free normal saline was used to hydro-dissect the fascial planes. After the fascial plane was verified, 10ml local anesthetic (LA) was injected for Interpectoral fascial plane block. The needle was continued " along the same path to the level of the 4th rib below PmM.  Initially preservative free normal saline was used to confirm needle position and then 20 ml of LA was injected for Pectoserratus fascial plane block. Aspiration every 5 ml to prevent intravascular injection. Injection was completed with negative aspiration of blood and negative intravascular injection. Injection pressures were normal with minimal resistance. Performed by: Claire Vaz CRNA         ASSESSMENT: The patient is a very pleasant 85 y.o. female  with bilateral lung cancers      PLAN:    1.  Bilateral upper lobes non-small cell lung cancer:  A.  I will continue annual surveillance at this point.  Next scan will be due January 2026.    2.  Hypertension:  A.  The patient will continue metoprolol as well as Benicar HCTZ    3. Hypercholesteremia:  A. She will continue Crestor 10 mg daily.    4.  Right upper outer quadrant breast cancer, T1 cN0 M0 stage I:  A.  Status post bilateral mastectomies done by Dr. Servin June 25, 2025.  B.  The patient is not interested in chemotherapy and hence I will hold off on doing mammogram.  C.  I will start the patient on anastrozole 1 mg daily for the next 5 to 10 years.  D.  We reviewed the potential side effects of anastrozole including hot flashes, vaginal dryness, joint pain, decrease in bone density, and mood or sleep disturbance.  E.  She does not require follow-up mammogram since she elected about the mastectomies.  F.  No indication for adjuvant radiation.    FOLLOW UP: 4 weeks to evaluate for treatment tolerance.     Luisana Washburn MD  7/16/2025

## 2025-07-18 LAB
BACTERIA SPEC AEROBE CULT: ABNORMAL
BACTERIA SPEC AEROBE CULT: ABNORMAL
GRAM STN SPEC: ABNORMAL

## 2025-07-19 LAB — BACTERIA SPEC ANAEROBE CULT: NORMAL

## 2025-07-21 ENCOUNTER — HOSPITAL ENCOUNTER (OUTPATIENT)
Dept: PHYSICAL THERAPY | Facility: HOSPITAL | Age: 85
Setting detail: THERAPIES SERIES
Discharge: HOME | End: 2025-07-21
Payer: MEDICARE

## 2025-07-21 DIAGNOSIS — Z90.13 H/O BILATERAL MASTECTOMY: ICD-10-CM

## 2025-07-21 DIAGNOSIS — T81.31XD SURGICAL WOUND DEHISCENCE, SUBSEQUENT ENCOUNTER: Primary | ICD-10-CM

## 2025-07-21 DIAGNOSIS — S21.009D: ICD-10-CM

## 2025-07-21 PROCEDURE — 97606 NEG PRS WND THER DME>50 SQCM: CPT

## 2025-07-21 PROCEDURE — 97597 DBRDMT OPN WND 1ST 20 CM/<: CPT

## 2025-07-21 NOTE — THERAPY WOUND CARE TREATMENT
Outpatient Rehabilitation - Wound/Debridement Treatment Note   Kenna     Patient Name: Amercia Nix  : 1940  MRN: [id number]  Today's Date: 2025                 Admit Date: 2025    Visit Dx:    ICD-10-CM ICD-9-CM   1. Surgical wound dehiscence, subsequent encounter  T81.31XD V58.89     998.32   2. H/O bilateral mastectomy  Z90.13 V45.71   3. 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    Coronary artery disease involving native coronary artery of native heart with angina pectoris    Dyspnea on exertion    Preoperative clearance    Right bundle branch block    Breast cancer, right        Past Medical History:   Diagnosis Date    JOSH (acute kidney injury) 06/10/2022    Arthritis     COPD (chronic obstructive pulmonary disease)     Emphysema lung     GERD (gastroesophageal reflux disease)     Hypercholesteremia     Hypertension     Lung cancer 2015    LEFT LUNG LOBECTOMY     Lung cancer 2017    RIGHT     On home O2     2.5 L HS, 2L during day when needed    Pneumonia     Pneumothorax 2015    AFTER LUNG BIOPSY     PONV (postoperative nausea and vomiting)     Seasonal allergies     Sleep apnea     CPAP COMPLIANT    Wears dentures     Wears glasses         Past Surgical History:   Procedure Laterality Date    ADRENALECTOMY Left 2018    Procedure: ADRENALECTOMY LAPAROSCOPIC LEFT;  Surgeon: Carol Ann Guzmán MD;  Location: [facility] OR;  Service: General    BREAST BIOPSY Bilateral     Bilateral biopsies years ago- Both were benign per patient    BRONCHOSCOPY N/A 2017    Procedure: BRONCHOSCOPY;  Surgeon: Jeremy Herrera MD;  Location: [facility] OR;  Service:     CHEST TUBE INSERTION       LEFT LUNG AFTER LUNG BIOPSY D/T PNEUMOTHORAX     COLONOSCOPY  2015    GOLD SEED FIDUCIAL PLACEMENT  05/21/2018    left adrenal gland    HYSTERECTOMY  1968    Complete Hysterectomy at age 28    LUNG BIOPSY Bilateral     LEFT IN 2015, RIGHT IN 2017     LUNG LOBECTOMY Left 08/2015    YELENA PER DR HERRERA     MASTECTOMY Bilateral 6/25/2025    Procedure: TOTAL MASTECTOMY BILATERAL;  Surgeon: Ken Paul MD;  Location:  [facility];  Service: General;  Laterality: Bilateral;    TEETH EXTRACTION      THORACOSCOPY VIDEO ASSISTED WITH LOBECTOMY Right 11/30/2017    Procedure: THORACOSCOPY VIDEO ASSISTED WITH RIGHT UPPER LOBE WEDGE RESECTION AND COMPLETION OF RIGHT UPPER LOBECTOMY, MEDIASTINAL LYMPH NODE DISSECTION AND INTERCOSTAL NERVE BLOCKS;  Surgeon: Jeremy Herrera MD;  Location:  [facility];  Service:          EVALUATION   PT Ortho       Row Name 07/21/25 1200       Subjective    Subjective Comments Pt states she was just seen by Dr. Paul, who removed an additional stitch from one of her wounds.  States she has a couple days of her oral doxycyline left, still noticing some odor from L breast wound.  States she sees MD again in two weeks.  -JM       Subjective Pain    Able to rate subjective pain? yes  -JM    Pre-Treatment Pain Level 0  -JM    Post-Treatment Pain Level 0  -JM       Transfers    Sit-Stand [place] (Transfers) independent  -JM    Stand-Sit [place] (Transfers) independent  -JM    Comment, (Transfers) reclined for tx  -JM       Gait/Stairs (Locomotion)    [place] Level (Gait) modified independence  -    Assistive Device (Gait) walker, 4-wheeled  -              User Key  (r) = Recorded By, (t) = Taken By, (c) = Cosigned By      Initials Name Provider Type    Miracle Guzmán PT Physical Therapist                     [facility] Wound       Row Name 07/21/25 1200             [REMOVED] Wound 06/25/25 Right lower breast Surgical Closed Surgical Incision    Wound - Properties Group Placement  Date: 06/25/25  -DH Side: Right  -DH Orientation: lower  -DH Location: breast  -DH Primary Wound Type: Surgical  -DH Secondary Wound Type - Surgical: Closed Surgi  -DH Removal Date: 07/21/25  -    Retired Wound - Properties Group Placement Date: 06/25/25  -DH Side: Right  -DH Orientation: lower  -DH Location: breast  -DH Removal Date: 07/21/25  -    Retired Wound - Properties Group Placement Date: 06/25/25  -DH Side: Right  -DH Orientation: lower  -DH Location: breast  -DH Removal Date: 07/21/25  -    Retired Wound - Properties Group Date first assessed: 06/25/25  -DH Side: Right  -DH Location: breast  -DH Resolution Date: 07/21/25  -       [REMOVED] Wound 06/25/25 Left lower breast Surgical Closed Surgical Incision    Wound - Properties Group Placement Date: 06/25/25  -DH Side: Left  -DH Orientation: lower  -DH Location: breast  -DH Primary Wound Type: Surgical  -DH Secondary Wound Type - Surgical: Closed Surgi  -DH Removal Date: 07/21/25  -    Retired Wound - Properties Group Placement Date: 06/25/25  -DH Side: Left  -DH Orientation: lower  -DH Location: breast  -DH Removal Date: 07/21/25  -    Retired Wound - Properties Group Placement Date: 06/25/25  -DH Side: Left  -DH Orientation: lower  -DH Location: breast  -DH Removal Date: 07/21/25  -    Retired Wound - Properties Group Date first assessed: 06/25/25  -DH Side: Left  -DH Location: breast  -DH Resolution Date: 07/21/25  -       Wound 07/14/25 1445 Left breast Surgical Dehisced    Wound - Properties Group Placement Date: 07/14/25  - Placement Time: 1445  -JM Side: Left  -JM Location: breast  - Primary Wound Type: Surgical  - Secondary Wound Type - Surgical: Dehisced  -    Wound Image Images linked: 1  -JM      Dressing Appearance intact;moist drainage  -      Dressing Removed Type gauze, [id number]  -      Confirmed Empty Wound Bed Yes, visual inspection of wound bed;Yes, probed  -      Base  moist;granulating;red;necrotic;subcutaneous;yellow  min necrotic slough around edges, SQ tissues, mostly beefy red granulation  -      Periwound intact;indurated;redness  -      Periwound Temperature warm  -      Periwound Skin Turgor firm  -      Edges irregular;open  -      Wound Length (cm) 3 cm  -      Wound Width (cm) 11 cm  -      Wound Depth (cm) 3 cm  -      Wound Surface Area (cm^2) 25.92 cm^2  -      Wound Volume (cm^3) 51.836 cm^3  -      Undermining [Depth (cm)/Location] 6.0cm@10:00-1:00  -      Drainage Characteristics/Odor serosanguineous;malodorous  -      Drainage Amount moderate  -      Care, Wound cleansed with;wound cleanser;debrided;negative pressure wound therapy  -      Dressing Care dressing applied  vac  -      Periwound Care cleansed with pH balanced cleanser;dry periwound area maintained;barrier film applied  nosting, stomapaste/drape border  -      Retired Wound - Properties Group Placement Date: 07/14/25  - Placement Time: 1445 -JM Side: Left  - Location: breast  -    Retired Wound - Properties Group Placement Date: 07/14/25  - Placement Time: 1445  - Side: Left  - Location: breast  -    Retired Wound - Properties Group Date first assessed: 07/14/25  - Time first assessed: 1445  - Side: Left  - Location: breast  -       Wound 07/14/25 1445 Right breast Surgical Dehisced    Wound - Properties Group Placement Date: 07/14/25  - Placement Time: 1445 - Side: Right  - Location: breast  - Primary Wound Type: Surgical  - Secondary Wound Type - Surgical: Dehisced  -    Wound Image Images linked: 1  -      Dressing Appearance intact;moist drainage  -      Dressing Removed Type gauze, [id number]  -      Confirmed Empty Wound Bed Yes, visual inspection of wound bed;Yes, probed  -      Base moist;granulating;red;subcutaneous;yellow;slough;eschar  -      Periwound intact;dry;indurated;redness  -      Periwound Temperature  warm  -      Periwound Skin Turgor soft  -JM      Edges irregular;open  -JM      Wound Length (cm) 3.5 cm  -JM      Wound Width (cm) 9 cm  -JM      Wound Depth (cm) 3.5 cm  -JM      Wound Surface Area (cm^2) 24.74 cm^2  -JM      Wound Volume (cm^3) 57.727 cm^3  -JM      Tunneling [Depth (cm)/Location] 4.2cm@12:00  -      Drainage Characteristics/Odor serosanguineous  -JM      Drainage Amount moderate  -JM      Care, Wound cleansed with;wound cleanser;debrided;negative pressure wound therapy  -      Dressing Care dressing applied  vac  -      Periwound Care cleansed with pH balanced cleanser;dry periwound area maintained;barrier film applied  nosting, stomapaste/drape border  -JM      Retired Wound - Properties Group Placement Date: 07/14/25  - Placement Time: 1445  -JM Side: Right  - Location: breast  -JM    Retired Wound - Properties Group Placement Date: 07/14/25  - Placement Time: 1445  - Side: Right  -JM Location: breast  -JM    Retired Wound - Properties Group Date first assessed: 07/14/25  - Time first assessed: 1445  - Side: Right  -JM Location: breast  -JM       NPWT (Negative Pressure Wound Therapy) 07/21/25 1200 R breast    NPWT (Negative Pressure Wound Therapy) - Properties Group Placement Date: 07/21/25  - Placement Time: 1200  -JM Location: R breast  -JM    Therapy Setting continuous therapy  -      Dressing foam, black;foam, white  -      Pressure Setting 150 mmHg  -JM      Sponges Inserted 2  1 white, 1 black  -      Sponges Removed other (see comments)  1 gauze conform roll  -      Finger sweep complete Yes  -JM      Retired NPWT (Negative Pressure Wound Therapy) - Properties Group Placement Date: 07/21/25  - Placement Time: 1200  -JM Location: R breast  -JM    Retired NPWT (Negative Pressure Wound Therapy) - Properties Group Placement Date: 07/21/25  - Placement Time: 1200  -JM Location: R breast  -JM    Retired NPWT (Negative Pressure Wound Therapy) - Properties  Group Placement Date: 07/21/25  - Placement Time: 1200  -JM Location: R breast  -JM       NPWT (Negative Pressure Wound Therapy) 07/21/25 1200 L breast    NPWT (Negative Pressure Wound Therapy) - Properties Group Placement Date: 07/21/25  - Placement Time: 1200  -JM Location: L breast  -JM    Therapy Setting continuous therapy  -JM      Dressing foam, black;foam, white  -JM      Pressure Setting 150 mmHg  -JM      Sponges Inserted 2  1 white, 1 black  -JM      Sponges Removed other (see comments)  1 conform roll  -JM      Finger sweep complete Yes  -JM      Retired NPWT (Negative Pressure Wound Therapy) - Properties Group Placement Date: 07/21/25  - Placement Time: 1200  -JM Location: L breast  -JM    Retired NPWT (Negative Pressure Wound Therapy) - Properties Group Placement Date: 07/21/25  - Placement Time: 1200  -JM Location: L breast  -JM    Retired NPWT (Negative Pressure Wound Therapy) - Properties Group Placement Date: 07/21/25  - Placement Time: 1200  -JM Location: L breast  -JM              User Key  (r) = Recorded By, (t) = Taken By, (c) = Cosigned By      Initials Name Provider Type    Damaris Shields, RN Registered Nurse    Miracle Guzmán, PT Physical Therapist                      WOUND DEBRIDEMENT  Total area of Debridement: 10cmsq  Debridement Site 1  Location- Site 1: R breast wound  Selective Debridement- Site 1: Wound Surface <20cmsq  Instruments- Site 1: #15, scapel, tweezers  Excised Tissue Description- Site 1: moderate, slough, eschar  Bleeding- Site 1: seeping, held pressure, 1 minute   Debridement Site 2  Location- Site 2: L breast wound  Selective Debridement- Site 2: Wound Surface <20cmsq  Instruments- Site 2: #15, scapel, tweezers  Excised Tissue Description- Site 2: moderate, slough  Bleeding- Site 2: seeping, held pressure, 1 minute          Therapy Education       Row Name 07/21/25 1200             Therapy Education    Education Details Education on home vac use and  management, onging POC 2x/week for wound vac changes  -      Given Symptoms/condition management;Bandaging/dressing change  -      Program Modified  -      How Provided Verbal;Demonstration;Written  -      Provided to Patient;Other (comment)  cousin  -      Level of Understanding Verbalized;Teach back education performed  -                User Key  (r) = Recorded By, (t) = Taken By, (c) = Cosigned By      Initials Name Provider Type    Miracle Guzmán, PT Physical Therapist                    Recommendation and Plan   PT Assessment/Plan       Row Name 07/21/25 1200          PT Assessment    Functional Limitations Performance in self-care ADL;Other (comment)  wound management limitations, limitations of cargiving for spouse  -     Impairments Integumentary integrity  -     Assessment Comments PT able to initate wound vac to B breast wounds, debrided additional slough/eschar from wound periphery   Pt with mostly clean beefy red wound beds, should progress well with NPWT.  -     Rehab Potential Good  -     Patient/caregiver participated in establishment of treatment plan and goals Yes  -     Patient would benefit from skilled therapy intervention Yes  -        PT Plan    PT Frequency 2x/week;3x/week  -     Physical Therapy Interventions (Optional Details) patient/family education;wound care  -     PT Plan Comments vac, debridement  -               User Key  (r) = Recorded By, (t) = Taken By, (c) = Cosigned By      Initials Name Provider Type    Miracle Guzmán, PT Physical Therapist                    Goals   PT OP Goals       Row Name 07/21/25 1607          Time Calculation    PT Goal Re-Cert Due Date 10/12/25  -               User Key  (r) = Recorded By, (t) = Taken By, (c) = Cosigned By      Initials Name Provider Type    Miracle Guzmán, PT Physical Therapist                    PT Goal Re-Cert Due Date: 10/12/25            Time Calculation: Start Time: 1215  Untimed  Charges  Wound Care: 46659 Neg Press (DME) wound OVER 50sqcm  65318-Yibyzckvd debridement: 15  99837-Ypg Pressure wound over 50sqcm: 60  Total Minutes  Untimed Charges Total Minutes: 75   Total Minutes: 75  Therapy Charges for Today       Code Description Service Date Service Provider Modifiers Qty    28231163057 HC PT NEG PRESS WOUND OVER 50SQCM DME4 7/21/2025 Miracle Tsai, PT GP 1    [id number] HC HOLGER DEBRIDE OPEN WOUND UP TO 20CM 7/21/2025 Miracle Tsai, PT GP 1                    Miracle Tsai, PT  7/21/2025

## 2025-07-24 ENCOUNTER — HOSPITAL ENCOUNTER (OUTPATIENT)
Dept: PHYSICAL THERAPY | Facility: HOSPITAL | Age: 85
Setting detail: THERAPIES SERIES
Discharge: HOME | End: 2025-07-24
Payer: MEDICARE

## 2025-07-24 DIAGNOSIS — Z90.13 H/O BILATERAL MASTECTOMY: ICD-10-CM

## 2025-07-24 DIAGNOSIS — S21.009D: ICD-10-CM

## 2025-07-24 DIAGNOSIS — T81.31XD SURGICAL WOUND DEHISCENCE, SUBSEQUENT ENCOUNTER: Primary | ICD-10-CM

## 2025-07-24 PROCEDURE — 97597 DBRDMT OPN WND 1ST 20 CM/<: CPT

## 2025-07-24 PROCEDURE — 97606 NEG PRS WND THER DME>50 SQCM: CPT

## 2025-07-24 NOTE — THERAPY WOUND CARE TREATMENT
Outpatient Rehabilitation - Wound/Debridement Treatment Note   Kenna     Patient Name: America Nix  : 1940  MRN: [id number]  Today's Date: 2025                 Admit Date: 2025    Visit Dx:    ICD-10-CM ICD-9-CM   1. Surgical wound dehiscence, subsequent encounter  T81.31XD V58.89     998.32   2. H/O bilateral mastectomy  Z90.13 V45.71   3. 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    Coronary artery disease involving native coronary artery of native heart with angina pectoris    Dyspnea on exertion    Preoperative clearance    Right bundle branch block    Breast cancer, right        Past Medical History:   Diagnosis Date    JOSH (acute kidney injury) 06/10/2022    Arthritis     COPD (chronic obstructive pulmonary disease)     Emphysema lung     GERD (gastroesophageal reflux disease)     Hypercholesteremia     Hypertension     Lung cancer 2015    LEFT LUNG LOBECTOMY     Lung cancer 2017    RIGHT     On home O2     2.5 L HS, 2L during day when needed    Pneumonia     Pneumothorax 2015    AFTER LUNG BIOPSY     PONV (postoperative nausea and vomiting)     Seasonal allergies     Sleep apnea     CPAP COMPLIANT    Wears dentures     Wears glasses         Past Surgical History:   Procedure Laterality Date    ADRENALECTOMY Left 2018    Procedure: ADRENALECTOMY LAPAROSCOPIC LEFT;  Surgeon: Carol Ann Guzmán MD;  Location: [facility] OR;  Service: General    BREAST BIOPSY Bilateral     Bilateral biopsies years ago- Both were benign per patient    BRONCHOSCOPY N/A 2017    Procedure: BRONCHOSCOPY;  Surgeon: Jeremy Herrera MD;  Location: [facility] OR;  Service:     CHEST TUBE INSERTION      LEFT LUNG AFTER LUNG  BIOPSY D/T PNEUMOTHORAX     COLONOSCOPY  2015    GOLD SEED FIDUCIAL PLACEMENT  05/21/2018    left adrenal gland    HYSTERECTOMY  1968    Complete Hysterectomy at age 28    LUNG BIOPSY Bilateral     LEFT IN 2015, RIGHT IN 2017     LUNG LOBECTOMY Left 08/2015    YELENA PER DR HERRERA     MASTECTOMY Bilateral 6/25/2025    Procedure: TOTAL MASTECTOMY BILATERAL;  Surgeon: Ken Paul MD;  Location: [facility] OR;  Service: General;  Laterality: Bilateral;    TEETH EXTRACTION      THORACOSCOPY VIDEO ASSISTED WITH LOBECTOMY Right 11/30/2017    Procedure: THORACOSCOPY VIDEO ASSISTED WITH RIGHT UPPER LOBE WEDGE RESECTION AND COMPLETION OF RIGHT UPPER LOBECTOMY, MEDIASTINAL LYMPH NODE DISSECTION AND INTERCOSTAL NERVE BLOCKS;  Surgeon: Jeremy Herrera MD;  Location:  [facility] OR;  Service:          EVALUATION   PT Ortho       Row Name 07/24/25 1415       Subjective    Subjective Comments Pt with no issues noted.  -MF       Subjective Pain    Able to rate subjective pain? yes  -MF    Pre-Treatment Pain Level 0  -MF    Post-Treatment Pain Level 0  -MF       Transfers    Sit-Stand [place] (Transfers) independent  -MF    Stand-Sit [place] (Transfers) independent  -MF    Comment, (Transfers) reclined for tx  -MF       Gait/Stairs (Locomotion)    [place] Level (Gait) modified independence  -    Assistive Device (Gait) walker, 4-wheeled  -MF              User Key  (r) = Recorded By, (t) = Taken By, (c) = Cosigned By      Initials Name Provider Type    Lior Post, PT Physical Therapist                     [facility] Wound       Row Name 07/24/25 1415             Wound 07/14/25 1445 Left breast Surgical Dehisced    Wound - Properties Group Placement Date: 07/14/25  - Placement Time: 1445  -JM Side: Left  - Location: breast  -JM Primary Wound Type: Surgical  -JM Secondary Wound Type - Surgical: Dehisced  -JM    Dressing Appearance intact;moist drainage  -MF      Dressing Removed Type other (see comments)  -       Confirmed Empty Wound Bed Yes, visual inspection of wound bed  -MF      Base moist;granulating;red;necrotic;subcutaneous;yellow  -MF      Periwound intact;indurated;redness  -      Periwound Temperature warm  -MF      Periwound Skin Turgor firm  -MF      Edges irregular;open  -MF      Wound Length (cm) 3 cm  -MF      Wound Width (cm) 10.5 cm  -MF      Wound Depth (cm) 2.5 cm  -MF      Wound Surface Area (cm^2) 24.74 cm^2  -MF      Wound Volume (cm^3) 41.233 cm^3  -MF      Undermining [Depth (cm)/Location] 4cm @10:00-1:00  -MF      Drainage Characteristics/Odor serosanguineous  -      Drainage Amount moderate  -      Care, Wound irrigated with;wound cleanser;debrided;negative pressure wound therapy  -      Dressing Care dressing changed  -      Periwound Care dry periwound area maintained;cleansed with pH balanced cleanser  -      Retired Wound - Properties Group Placement Date: 07/14/25  - Placement Time: 1445 - Side: Left  - Location: breast  -    Retired Wound - Properties Group Placement Date: 07/14/25  - Placement Time: 1445  - Side: Left  - Location: breast  -    Retired Wound - Properties Group Date first assessed: 07/14/25  - Time first assessed: 1445  - Side: Left  - Location: breast  -       Wound 07/14/25 1445 Right breast Surgical Dehisced    Wound - Properties Group Placement Date: 07/14/25  - Placement Time: 1445 - Side: Right  - Location: breast  - Primary Wound Type: Surgical  - Secondary Wound Type - Surgical: Dehisced  -    Dressing Appearance intact;moist drainage  -      Dressing Removed Type other (see comments)  -      Confirmed Empty Wound Bed Yes, visual inspection of wound bed  -MF      Base moist;granulating;red;subcutaneous;yellow;slough;eschar  -      Periwound intact;dry;indurated;redness  -      Periwound Temperature warm  -      Periwound Skin Turgor soft  -MF      Edges irregular;open  -MF      Wound Length (cm) 2.5 cm  -       Wound Width (cm) 9 cm  -MF      Wound Depth (cm) 2 cm  -MF      Wound Surface Area (cm^2) 17.67 cm^2  -MF      Wound Volume (cm^3) 23.562 cm^3  -MF      Tunneling [Depth (cm)/Location] 3.8cm @12:00  -MF      Drainage Characteristics/Odor serosanguineous  -MF      Drainage Amount moderate  -MF      Care, Wound irrigated with;wound cleanser;debrided;negative pressure wound therapy  -MF      Dressing Care dressing changed  -MF      Periwound Care cleansed with pH balanced cleanser  -MF      Retired Wound - Properties Group Placement Date: 07/14/25 -JM Placement Time: 1445  -JM Side: Right  - Location: breast  -JM    Retired Wound - Properties Group Placement Date: 07/14/25 -JM Placement Time: 1445  -JM Side: Right  - Location: breast  -JM    Retired Wound - Properties Group Date first assessed: 07/14/25  -JM Time first assessed: 1445  - Side: Right  - Location: breast  -       NPWT (Negative Pressure Wound Therapy) 07/21/25 1200 R breast    NPWT (Negative Pressure Wound Therapy) - Properties Group Placement Date: 07/21/25 -JM Placement Time: 1200 -JM Location: R breast  -JM    Therapy Setting continuous therapy  -MF      Dressing foam, black;foam, white  -MF      Pressure Setting 150 mmHg  -MF      Sponges Inserted 2  1 white 1 black  -MF      Sponges Removed 2  1 white 1 black  -MF      Finger sweep complete Yes  -MF      Retired NPWT (Negative Pressure Wound Therapy) - Properties Group Placement Date: 07/21/25  -JM Placement Time: 1200  -JM Location: R breast  -JM    Retired NPWT (Negative Pressure Wound Therapy) - Properties Group Placement Date: 07/21/25  -JM Placement Time: 1200 -JM Location: R breast  -JM    Retired NPWT (Negative Pressure Wound Therapy) - Properties Group Placement Date: 07/21/25  -JM Placement Time: 1200  -JM Location: R breast  -JM       NPWT (Negative Pressure Wound Therapy) 07/21/25 1200 L breast    NPWT (Negative Pressure Wound Therapy) - Properties Group Placement Date:  07/21/25  - Placement Time: 1200  -JM Location: L breast  -JM    Therapy Setting continuous therapy  -MF      Dressing foam, black;foam, white  -MF      Pressure Setting 150 mmHg  -MF      Sponges Inserted 2  1 white 1 black  -MF      Sponges Removed 2  1 white 1 black  -MF      Finger sweep complete Yes  -MF      Retired NPWT (Negative Pressure Wound Therapy) - Properties Group Placement Date: 07/21/25  - Placement Time: 1200  -JM Location: L breast  -JM    Retired NPWT (Negative Pressure Wound Therapy) - Properties Group Placement Date: 07/21/25  - Placement Time: 1200  -JM Location: L breast  -JM    Retired NPWT (Negative Pressure Wound Therapy) - Properties Group Placement Date: 07/21/25  - Placement Time: 1200  -JM Location: L breast  -              User Key  (r) = Recorded By, (t) = Taken By, (c) = Cosigned By      Initials Name Provider Type    MF Lior Sommer, PT Physical Therapist    Miracle Guzmán, PT Physical Therapist                      WOUND DEBRIDEMENT  Total area of Debridement: ~10cm2  Debridement Site 1  Location- Site 1: R breast wound  Selective Debridement- Site 1: Wound Surface <20cmsq  Instruments- Site 1: tweezers, scissors  Excised Tissue Description- Site 1: minimum, slough  Bleeding- Site 1: none                 Recommendation and Plan   PT Assessment/Plan       Row Name 07/24/25 4719          PT Assessment    Functional Limitations Performance in self-care ADL;Other (comment)  -MF     Impairments Integumentary integrity  -MF     Assessment Comments B mastectomy wounds progressing well with good decrease in wound size noted and improving granulation. Pt will cont to benefit from wound vac to help further improve healing potential  -MF     Rehab Potential Good  -MF     Patient/caregiver participated in establishment of treatment plan and goals Yes  -MF     Patient would benefit from skilled therapy intervention Yes  -MF        PT Plan    PT Frequency 2x/week;3x/week   -     Physical Therapy Interventions (Optional Details) wound care;patient/family education  -     PT Plan Comments vac, debridement  -               User Key  (r) = Recorded By, (t) = Taken By, (c) = Cosigned By      Initials Name Provider Type    Lior Post, PT Physical Therapist                    Goals   PT OP Goals       Row Name 07/24/25 1415          Time Calculation    PT Goal Re-Cert Due Date 10/12/25  -               User Key  (r) = Recorded By, (t) = Taken By, (c) = Cosigned By      Initials Name Provider Type    Lior Post, PT Physical Therapist                    PT Goal Re-Cert Due Date: 10/12/25            Time Calculation: Start Time: 1415  Untimed Charges  40451-Ozpzfpokn debridement: 10  77180-Gyd Pressure wound over 50sqcm: 40  Total Minutes  Untimed Charges Total Minutes: 50   Total Minutes: 50              Lior Sommer, PT  7/24/2025

## 2025-07-29 ENCOUNTER — HOSPITAL ENCOUNTER (OUTPATIENT)
Dept: PHYSICAL THERAPY | Facility: HOSPITAL | Age: 85
Setting detail: THERAPIES SERIES
Discharge: HOME | End: 2025-07-29
Payer: MEDICARE

## 2025-07-29 DIAGNOSIS — T81.31XD SURGICAL WOUND DEHISCENCE, SUBSEQUENT ENCOUNTER: Primary | ICD-10-CM

## 2025-07-29 DIAGNOSIS — Z90.13 H/O BILATERAL MASTECTOMY: ICD-10-CM

## 2025-07-29 DIAGNOSIS — S21.009D: ICD-10-CM

## 2025-07-29 PROCEDURE — 97606 NEG PRS WND THER DME>50 SQCM: CPT

## 2025-07-30 ENCOUNTER — TELEPHONE (OUTPATIENT)
Dept: PHYSICAL THERAPY | Facility: HOSPITAL | Age: 85
End: 2025-07-30
Payer: MEDICARE

## 2025-07-30 NOTE — THERAPY WOUND CARE TREATMENT
Outpatient Rehabilitation - Wound/Debridement Treatment Note   Kenna     Patient Name: America Nix  : 1940  MRN: [id number]  Today's Date: 2025                 Admit Date: 2025    Visit Dx:    ICD-10-CM ICD-9-CM   1. Surgical wound dehiscence, subsequent encounter  T81.31XD V58.89     998.32   2. H/O bilateral mastectomy  Z90.13 V45.71   3. 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    Coronary artery disease involving native coronary artery of native heart with angina pectoris    Dyspnea on exertion    Preoperative clearance    Right bundle branch block    Breast cancer, right        Past Medical History:   Diagnosis Date    JOSH (acute kidney injury) 06/10/2022    Arthritis     COPD (chronic obstructive pulmonary disease)     Emphysema lung     GERD (gastroesophageal reflux disease)     Hypercholesteremia     Hypertension     Lung cancer 2015    LEFT LUNG LOBECTOMY     Lung cancer 2017    RIGHT     On home O2     2.5 L HS, 2L during day when needed    Pneumonia     Pneumothorax 2015    AFTER LUNG BIOPSY     PONV (postoperative nausea and vomiting)     Seasonal allergies     Sleep apnea     CPAP COMPLIANT    Wears dentures     Wears glasses         Past Surgical History:   Procedure Laterality Date    ADRENALECTOMY Left 2018    Procedure: ADRENALECTOMY LAPAROSCOPIC LEFT;  Surgeon: Carol Ann Guzmán MD;  Location: [facility] OR;  Service: General    BREAST BIOPSY Bilateral     Bilateral biopsies years ago- Both were benign per patient    BRONCHOSCOPY N/A 2017    Procedure: BRONCHOSCOPY;  Surgeon: Jeremy Herrera MD;  Location: [facility] OR;  Service:     CHEST TUBE INSERTION      LEFT LUNG AFTER LUNG  BIOPSY D/T PNEUMOTHORAX     COLONOSCOPY  2015    GOLD SEED FIDUCIAL PLACEMENT  05/21/2018    left adrenal gland    HYSTERECTOMY  1968    Complete Hysterectomy at age 28    LUNG BIOPSY Bilateral     LEFT IN 2015, RIGHT IN 2017     LUNG LOBECTOMY Left 08/2015    YELENA PER DR HERRERA     MASTECTOMY Bilateral 6/25/2025    Procedure: TOTAL MASTECTOMY BILATERAL;  Surgeon: Ken Paul MD;  Location:  [facility] OR;  Service: General;  Laterality: Bilateral;    TEETH EXTRACTION      THORACOSCOPY VIDEO ASSISTED WITH LOBECTOMY Right 11/30/2017    Procedure: THORACOSCOPY VIDEO ASSISTED WITH RIGHT UPPER LOBE WEDGE RESECTION AND COMPLETION OF RIGHT UPPER LOBECTOMY, MEDIASTINAL LYMPH NODE DISSECTION AND INTERCOSTAL NERVE BLOCKS;  Surgeon: Jeremy Herrera MD;  Location:  [facility] OR;  Service:          EVALUATION   PT Ortho       Row Name 07/29/25 1345       Subjective    Subjective Comments No new complaints or changes. Has noticed to odor but thought it might be due to the dressing being on for five days instead of four.  -MC       Subjective Pain    Able to rate subjective pain? yes  -MC    Pre-Treatment Pain Level 0  -MC    Post-Treatment Pain Level 0  -MC       Transfers    Sit-Stand [place] (Transfers) independent  -MC    Stand-Sit [place] (Transfers) independent  -MC    Comment, (Transfers) reclined for tx  -       Gait/Stairs (Locomotion)    [place] Level (Gait) modified independence  -    Assistive Device (Gait) walker, 4-wheeled  -              User Key  (r) = Recorded By, (t) = Taken By, (c) = Cosigned By      Initials Name Provider Type    Josefina Reyes PT Physical Therapist                     [facility] Wound       Row Name 07/29/25 1345             Wound 07/14/25 1445 Left breast Surgical Dehisced    Wound - Properties Group Placement Date: 07/14/25  -JM Placement Time: 1445  -JM Side: Left  -JM Location: breast  -JM Primary Wound Type: Surgical  -JM Secondary Wound Type - Surgical: Dehisced  -JM     Dressing Appearance intact;moist drainage  -      Dressing Removed Type other (see comments)  vac  -MC      Confirmed Empty Wound Bed Yes, visual inspection of wound bed;Yes, probed  -MC      Base moist;granulating;red;necrotic;subcutaneous;yellow  -      Periwound intact;indurated;redness;yeast  very mild rash medially, consistent with yeast  -      Periwound Temperature warm  -      Periwound Skin Turgor firm  -      Edges irregular;open  -      Undermining [Depth (cm)/Location] appears sealed today  -      Drainage Characteristics/Odor serosanguineous;malodorous  odor c/w yeast  -      Drainage Amount moderate  -      Care, Wound cleansed with;wound cleanser;negative pressure wound therapy  -      Dressing Care dressing changed  vac  -      Periwound Care cleansed with pH balanced cleanser;barrier film applied;other (see comments)  NoSting, stomapaste, drape  -      Retired Wound - Properties Group Placement Date: 07/14/25 - Placement Time: 1445 - Side: Left  - Location: breast  -JM    Retired Wound - Properties Group Placement Date: 07/14/25  - Placement Time: 1445  - Side: Left  - Location: breast  -JM    Retired Wound - Properties Group Date first assessed: 07/14/25 - Time first assessed: 1445 - Side: Left  - Location: breast  -JM       Wound 07/14/25 1445 Right breast Surgical Dehisced    Wound - Properties Group Placement Date: 07/14/25 - Placement Time: 1445 - Side: Right  - Location: breast  - Primary Wound Type: Surgical  - Secondary Wound Type - Surgical: Dehisced  -    Dressing Appearance intact;moist drainage  -      Dressing Removed Type other (see comments)  vac  -MC      Confirmed Empty Wound Bed Yes, visual inspection of wound bed;Yes, probed  -      Base moist;granulating;red;subcutaneous;yellow;slough  -      Periwound intact;dry;indurated;redness;yeast  very mild yeast rash  -      Periwound Temperature warm  -       Periwound Skin Turgor soft  -      Edges irregular;open  -      Drainage Characteristics/Odor serosanguineous;malodorous  odor c/w yeast  -MC      Drainage Amount moderate  -      Care, Wound cleansed with;wound cleanser;negative pressure wound therapy  -      Dressing Care dressing changed  vac  -      Periwound Care cleansed with pH balanced cleanser;barrier film applied;other (see comments)  NoSTing, stomapaste, drape border  -      Retired Wound - Properties Group Placement Date: 07/14/25 -JM Placement Time: 1445 -JM Side: Right  - Location: breast  -    Retired Wound - Properties Group Placement Date: 07/14/25 -JM Placement Time: 1445 -JM Side: Right  - Location: breast  -    Retired Wound - Properties Group Date first assessed: 07/14/25 -JM Time first assessed: 1445 -JM Side: Right  - Location: breast  -       NPWT (Negative Pressure Wound Therapy) 07/21/25 1200 R breast    NPWT (Negative Pressure Wound Therapy) - Properties Group Placement Date: 07/21/25 -JM Placement Time: 1200 -JM Location: R breast  -    Therapy Setting continuous therapy  -      Dressing foam, black;foam, white  -      Pressure Setting 150 mmHg  -      Sponges Inserted 2  1 white, 1 black  -MC      Sponges Removed 2  1 white, 1 black  -MC      Finger sweep complete Yes  -      Retired NPWT (Negative Pressure Wound Therapy) - Properties Group Placement Date: 07/21/25 -JM Placement Time: 1200  -JM Location: R breast  -JM    Retired NPWT (Negative Pressure Wound Therapy) - Properties Group Placement Date: 07/21/25  -JM Placement Time: 1200 -JM Location: R breast  -    Retired NPWT (Negative Pressure Wound Therapy) - Properties Group Placement Date: 07/21/25  -JM Placement Time: 1200 -JM Location: R breast  -JM       NPWT (Negative Pressure Wound Therapy) 07/21/25 1200 L breast    NPWT (Negative Pressure Wound Therapy) - Properties Group Placement Date: 07/21/25  -JM Placement Time: 1200 -JM  Location: L breast  -JM    Therapy Setting continuous therapy  -MC      Dressing foam, black;foam, white  -MC      Pressure Setting 150 mmHg  -MC      Sponges Inserted 2  1 white, 1 black  -MC      Sponges Removed 2  1 white, 1 black  -MC      Finger sweep complete Yes  -MC      Retired NPWT (Negative Pressure Wound Therapy) - Properties Group Placement Date: 07/21/25  - Placement Time: 1200  -JM Location: L breast  -JM    Retired NPWT (Negative Pressure Wound Therapy) - Properties Group Placement Date: 07/21/25  - Placement Time: 1200  -JM Location: L breast  -JM    Retired NPWT (Negative Pressure Wound Therapy) - Properties Group Placement Date: 07/21/25  - Placement Time: 1200  -JM Location: L breast  -JM              User Key  (r) = Recorded By, (t) = Taken By, (c) = Cosigned By      Initials Name Provider Type    Josefina Reyes, PT Physical Therapist    Miracle Guzmán, PT Physical Therapist                      WOUND DEBRIDEMENT                    Therapy Education       Row Name 07/29/25 9304             Therapy Education    Education Details Will call Dr Paul's office to ask about nystatin powder. Bring it with you to your next appt.  -MC      Given Symptoms/condition management;Bandaging/dressing change  -MC      Program Reinforced  -MC      How Provided Verbal;Demonstration;Written  -MC      Provided to Patient;Other (comment)  cousin  -MC      Level of Understanding Verbalized;Teach back education performed  -MC                User Key  (r) = Recorded By, (t) = Taken By, (c) = Cosigned By      Initials Name Provider Type    Josefina Reyes, PT Physical Therapist                    Recommendation and Plan   PT Assessment/Plan       Row Name 07/29/25 6740          PT Assessment    Functional Limitations Performance in self-care ADL;Other (comment)  -MC     Impairments Integumentary integrity  -MC     Assessment Comments Pt with notably improved appearance to L breast wound, with  apparent sealing of superior undermining since last assessment. Pt still with some tunneling to the R breast wound. Pt with mild rash and moderate odor to both sides consistent with yeast, so PT will ask MD for nystatin powder if he agrees with this assessment. Pt will continue to benefit from skilled PT wound care to continue progress.  -     Rehab Potential Good  -     Patient/caregiver participated in establishment of treatment plan and goals Yes  -     Patient would benefit from skilled therapy intervention Yes  -        PT Plan    PT Frequency 2x/week  -     Physical Therapy Interventions (Optional Details) wound care;patient/family education  -     PT Plan Comments debridement prn, vac, PT called for nystatin powder 7/30  -               User Key  (r) = Recorded By, (t) = Taken By, (c) = Cosigned By      Initials Name Provider Type    Josefina Reyes, PT Physical Therapist                    Goals   PT OP Goals       Row Name 07/29/25 1536          Time Calculation    PT Goal Re-Cert Due Date 10/12/25  -               User Key  (r) = Recorded By, (t) = Taken By, (c) = Cosigned By      Initials Name Provider Type    Josefina Reyes, PT Physical Therapist                    PT Goal Re-Cert Due Date: 10/12/25            Time Calculation: Start Time: 1345  Untimed Charges  23153-Wvr Pressure wound over 50sqcm: 45  Total Minutes  Untimed Charges Total Minutes: 45   Total Minutes: 45  Therapy Charges for Today       Code Description Service Date Service Provider Modifiers Qty    [id number] HC PT NEG PRESS WOUND OVER 50SQCM DME3 7/29/2025 Josefina Linares, PT  1                    Josefina Linares, PT  7/30/2025

## 2025-07-30 NOTE — TELEPHONE ENCOUNTER
Left a VM on the clinical support line at Dr. Paul's office about Ms Nix. Explained that she has developed some odor and redness consistent with yeast, and may benefit from Rx for nystatin powder to allow continued usage of the wound vac while addressing this new development.    Josefina Linares, PT 7/30/2025 11:00 EDT

## 2025-08-01 ENCOUNTER — HOSPITAL ENCOUNTER (OUTPATIENT)
Dept: PHYSICAL THERAPY | Facility: HOSPITAL | Age: 85
Setting detail: THERAPIES SERIES
Discharge: HOME | End: 2025-08-01
Payer: MEDICARE

## 2025-08-01 DIAGNOSIS — S21.009D: ICD-10-CM

## 2025-08-01 DIAGNOSIS — T81.31XD SURGICAL WOUND DEHISCENCE, SUBSEQUENT ENCOUNTER: Primary | ICD-10-CM

## 2025-08-01 DIAGNOSIS — Z90.13 H/O BILATERAL MASTECTOMY: ICD-10-CM

## 2025-08-01 PROCEDURE — 97606 NEG PRS WND THER DME>50 SQCM: CPT

## 2025-08-01 PROCEDURE — 97597 DBRDMT OPN WND 1ST 20 CM/<: CPT

## 2025-08-01 NOTE — THERAPY WOUND CARE TREATMENT
Outpatient Rehabilitation - Wound/Debridement Treatment Note   Kenna     Patient Name: America Nix  : 1940  MRN: [id number]  Today's Date: 2025             L breast wound      R breast wound      Admit Date: 2025    Visit Dx:    ICD-10-CM ICD-9-CM   1. Surgical wound dehiscence, subsequent encounter  T81.31XD V58.89     998.32   2. H/O bilateral mastectomy  Z90.13 V45.71   3. 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    Coronary artery disease involving native coronary artery of native heart with angina pectoris    Dyspnea on exertion    Preoperative clearance    Right bundle branch block    Breast cancer, right        Past Medical History:   Diagnosis Date    JOSH (acute kidney injury) 06/10/2022    Arthritis     COPD (chronic obstructive pulmonary disease)     Emphysema lung     GERD (gastroesophageal reflux disease)     Hypercholesteremia     Hypertension     Lung cancer 2015    LEFT LUNG LOBECTOMY     Lung cancer 2017    RIGHT     On home O2     2.5 L HS, 2L during day when needed    Pneumonia     Pneumothorax     AFTER LUNG BIOPSY     PONV (postoperative nausea and vomiting)     Seasonal allergies     Sleep apnea     CPAP COMPLIANT    Wears dentures     Wears glasses         Past Surgical History:   Procedure Laterality Date    ADRENALECTOMY Left 2018    Procedure: ADRENALECTOMY LAPAROSCOPIC LEFT;  Surgeon: Carol Ann Guzmán MD;  Location: [facility] OR;  Service: General    BREAST BIOPSY Bilateral     Bilateral biopsies years ago- Both were benign per patient    BRONCHOSCOPY N/A 2017    Procedure: BRONCHOSCOPY;  Surgeon: Jeremy Herrera MD;  Location: [facility] OR;  Service:     CHEST TUBE INSERTION   2015    LEFT LUNG AFTER LUNG BIOPSY D/T PNEUMOTHORAX     COLONOSCOPY  2015    GOLD SEED FIDUCIAL PLACEMENT  05/21/2018    left adrenal gland    HYSTERECTOMY  1968    Complete Hysterectomy at age 28    LUNG BIOPSY Bilateral     LEFT IN 2015, RIGHT IN 2017     LUNG LOBECTOMY Left 08/2015    YELENA PER DR HERRERA     MASTECTOMY Bilateral 6/25/2025    Procedure: TOTAL MASTECTOMY BILATERAL;  Surgeon: Ken Paul MD;  Location:  [facility] OR;  Service: General;  Laterality: Bilateral;    TEETH EXTRACTION      THORACOSCOPY VIDEO ASSISTED WITH LOBECTOMY Right 11/30/2017    Procedure: THORACOSCOPY VIDEO ASSISTED WITH RIGHT UPPER LOBE WEDGE RESECTION AND COMPLETION OF RIGHT UPPER LOBECTOMY, MEDIASTINAL LYMPH NODE DISSECTION AND INTERCOSTAL NERVE BLOCKS;  Surgeon: Jeremy Herrera MD;  Location:  [facility] OR;  Service:          EVALUATION   PT Ortho       Row Name 08/01/25 1500       Subjective    Subjective Comments No new complaints. Got nystatin Rx and brought that with her today.  -MC       Subjective Pain    Able to rate subjective pain? yes  -MC    Pre-Treatment Pain Level 0  -MC    Post-Treatment Pain Level 0  -       Transfers    Sit-Stand Cheney (Transfers) independent  -MC    Stand-Sit Cheney (Transfers) independent  -MC    Comment, (Transfers) reclined for tx  -       Gait/Stairs (Locomotion)    Cheney Level (Gait) modified independence  -    Assistive Device (Gait) walker, 4-wheeled  -              User Key  (r) = Recorded By, (t) = Taken By, (c) = Cosigned By      Initials Name Provider Type    Josefina Reyes PT Physical Therapist                     KARLEY Wound       Row Name 08/01/25 1500             Wound 07/14/25 1445 Left breast Surgical Dehisced    Wound - Properties Group Placement Date: 07/14/25  - Placement Time: 1445  -JM Side: Left  -JM Location: breast  -JM Primary Wound Type: Surgical  -JM Secondary Wound Type - Surgical: Dehisced  -JM    Wound Image Images linked: 1  -       Dressing Appearance intact;moist drainage  -      Dressing Removed Type other (see comments)  vac  -      Confirmed Empty Wound Bed Yes, visual inspection of wound bed;Yes, finger sweep performed  -      Base moist;granulating;red;necrotic;epithelialization  -      Periwound intact;indurated;redness;yeast  very mild rash medially, consistent with yeast  -      Periwound Temperature warm  -      Periwound Skin Turgor firm  -      Edges irregular;open  -      Wound Length (cm) 1.2 cm  -      Wound Width (cm) 7.2 cm  -      Wound Depth (cm) 1 cm  -      Wound Surface Area (cm^2) 6.79 cm^2  -      Wound Volume (cm^3) 4.524 cm^3  -      Drainage Characteristics/Odor serosanguineous;malodorous  odor c/w yeast  -      Drainage Amount moderate  -      Care, Wound cleansed with;wound cleanser;debrided  -      Dressing Care dressing applied;silver impregnated;collagen;hydrofiber;low-adherent;silicone border foam  isabela, opticell Ag ribbon (half width), mepilex incisional dressing  -      Periwound Care cleansed with pH balanced cleanser;barrier film applied;topical treatment applied  Nystatin/NoSting crusting x2  -      Wound Output (mL) 100  changed canister  -      Retired Wound - Properties Group Placement Date: 07/14/25 -JM Placement Time: 1445 -JM Side: Left  - Location: breast  -    Retired Wound - Properties Group Placement Date: 07/14/25 -JM Placement Time: 1445 -JM Side: Left  - Location: breast  -    Retired Wound - Properties Group Date first assessed: 07/14/25 -JM Time first assessed: 1445 -JM Side: Left  - Location: breast  -JM       Wound 07/14/25 1445 Right breast Surgical Dehisced    Wound - Properties Group Placement Date: 07/14/25 -JM Placement Time: 1445 -JM Side: Right  - Location: breast  - Primary Wound Type: Surgical  - Secondary Wound Type - Surgical: Dehisced  -    Wound Image Images linked: 1  -      Dressing Appearance  intact;moist drainage  -      Dressing Removed Type other (see comments)  vac  -      Confirmed Empty Wound Bed Yes, visual inspection of wound bed;Yes, probed  -      Base moist;granulating;red;subcutaneous;yellow;slough  -      Periwound intact;dry;indurated;redness;yeast  very mild yeast rash  -      Periwound Temperature warm  -      Periwound Skin Turgor soft  -      Edges irregular;open  -      Wound Length (cm) 2.2 cm  -      Wound Width (cm) 9 cm  -      Wound Depth (cm) 2 cm  -      Wound Surface Area (cm^2) 15.55 cm^2  -      Wound Volume (cm^3) 20.734 cm^3  -      Tunneling [Depth (cm)/Location] 1.5cm @ 3:00  -      Drainage Characteristics/Odor serosanguineous;malodorous  odor c/w yeast  -      Drainage Amount moderate  -      Care, Wound cleansed with;wound cleanser;debrided;negative pressure wound therapy  -      Dressing Care dressing changed  vac  -      Periwound Care cleansed with pH balanced cleanser;barrier film applied;topical treatment applied;other (see comments)  Nystatin/NoSting crusting x2, stomapaste, drape border  -      Retired Wound - Properties Group Placement Date: 07/14/25 -JM Placement Time: 1445 -JM Side: Right  - Location: breast  -    Retired Wound - Properties Group Placement Date: 07/14/25 - Placement Time: 1445  -JM Side: Right  - Location: breast  -    Retired Wound - Properties Group Date first assessed: 07/14/25 - Time first assessed: 1445  - Side: Right  - Location: breast  -       NPWT (Negative Pressure Wound Therapy) 07/21/25 1200 R breast    NPWT (Negative Pressure Wound Therapy) - Properties Group Placement Date: 07/21/25 - Placement Time: 1200  -JM Location: R breast  -    Therapy Setting continuous therapy  -      Dressing foam, black;gauze, antimicrobial  -      Pressure Setting 150 mmHg  -      Sponges Inserted 1;other (see comments)  1 sorbact, 1 black  -      Sponges Removed 2  1 white, 1  black  -MC      Finger sweep complete Yes  -MC      Retired NPWT (Negative Pressure Wound Therapy) - Properties Group Placement Date: 07/21/25  -JM Placement Time: 1200  -JM Location: R breast  -JM    Retired NPWT (Negative Pressure Wound Therapy) - Properties Group Placement Date: 07/21/25  -JM Placement Time: 1200  -JM Location: R breast  -JM    Retired NPWT (Negative Pressure Wound Therapy) - Properties Group Placement Date: 07/21/25  -JM Placement Time: 1200  -JM Location: R breast  -JM       NPWT (Negative Pressure Wound Therapy) 07/21/25 1200 L breast    NPWT (Negative Pressure Wound Therapy) - Properties Group Placement Date: 07/21/25  -JM Placement Time: 1200  -JM Location: L breast  -JM    Therapy Setting vacuum off  on HOLD  -MC      Sponges Removed 2  1 white, 1 black  -MC      Finger sweep complete Yes  -MC      Retired NPWT (Negative Pressure Wound Therapy) - Properties Group Placement Date: 07/21/25  -JM Placement Time: 1200  -JM Location: L breast  -JM    Retired NPWT (Negative Pressure Wound Therapy) - Properties Group Placement Date: 07/21/25  -JM Placement Time: 1200  -JM Location: L breast  -JM    Retired NPWT (Negative Pressure Wound Therapy) - Properties Group Placement Date: 07/21/25  -JM Placement Time: 1200  -JM Location: L breast  -JM              User Key  (r) = Recorded By, (t) = Taken By, (c) = Cosigned By      Initials Name Provider Type    Josefina Reyes, PT Physical Therapist    Miracle Guzmán, PT Physical Therapist                      WOUND DEBRIDEMENT  Total area of Debridement: 6 cm2  Debridement Site 1  Location- Site 1: R breast wound  Selective Debridement- Site 1: Wound Surface <20cmsq  Instruments- Site 1: tweezers  Excised Tissue Description- Site 1: minimum, slough  Bleeding- Site 1: none   Debridement Site 2  Location- Site 2: L breast wound  Selective Debridement- Site 2: Wound Surface <20cmsq  Instruments- Site 2: tweezers  Excised Tissue Description- Site  2: minimum, slough  Bleeding- Site 2: none          Therapy Education       Row Name 08/01/25 1500             Therapy Education    Education Details L breast wound vac on HOLD. May keep dressing in place until next appt. Continue NPWT to R side  -      Given Symptoms/condition management;Bandaging/dressing change  -      Program Reinforced;Progressed  -      How Provided Verbal;Demonstration;Written  -MC      Provided to Patient;Other (comment)  cousin  -      Level of Understanding Verbalized;Teach back education performed  -                User Key  (r) = Recorded By, (t) = Taken By, (c) = Cosigned By      Initials Name Provider Type    Josefina Reyes PT Physical Therapist                    Recommendation and Plan   PT Assessment/Plan       Row Name 08/01/25 1500          PT Assessment    Functional Limitations Performance in self-care ADL;Other (comment)  -     Impairments Integumentary integrity  -     Assessment Comments Pt with notable reduction in dimensions to the L breast wound since last assessment, so vac on HOLD over the weekend to assess response to advanced dressings. Pt with notable reduction in tunneling to the R breast wound, with otherwise stable dimensions (with new epithelialization not adequately represented in measurements). Pt will continue to benefit from NPWT to the R breast, at least until tunneling is resolved.  -     Rehab Potential Good  -     Patient/caregiver participated in establishment of treatment plan and goals Yes  -     Patient would benefit from skilled therapy intervention Yes  -        PT Plan    PT Frequency 2x/week  -     Physical Therapy Interventions (Optional Details) wound care;patient/family education  -     PT Plan Comments debridement, dressings vs vac  -               User Key  (r) = Recorded By, (t) = Taken By, (c) = Cosigned By      Initials Name Provider Type    Josefina Reyes PT Physical Therapist                     Goals   PT OP Goals       Row Name 08/01/25 1515          Time Calculation    PT Goal Re-Cert Due Date 10/12/25  -DEANA               User Key  (r) = Recorded By, (t) = Taken By, (c) = Cosigned By      Initials Name Provider Type    Josefina Reyes, PT Physical Therapist                    PT Goal Re-Cert Due Date: 10/12/25            Time Calculation: Start Time: 1115  Untimed Charges  21001-Pjwnrniah debridement: 10  20011-Mmv Pressure wound over 50sqcm: 40  Total Minutes  Untimed Charges Total Minutes: 50   Total Minutes: 50              Josefina Linares, PT  8/1/2025

## 2025-08-04 ENCOUNTER — HOSPITAL ENCOUNTER (OUTPATIENT)
Dept: PHYSICAL THERAPY | Facility: HOSPITAL | Age: 85
Setting detail: THERAPIES SERIES
Discharge: HOME | End: 2025-08-04
Payer: MEDICARE

## 2025-08-04 DIAGNOSIS — T81.31XD SURGICAL WOUND DEHISCENCE, SUBSEQUENT ENCOUNTER: Primary | ICD-10-CM

## 2025-08-04 DIAGNOSIS — Z90.13 H/O BILATERAL MASTECTOMY: ICD-10-CM

## 2025-08-04 DIAGNOSIS — S21.009D: ICD-10-CM

## 2025-08-04 PROCEDURE — 97606 NEG PRS WND THER DME>50 SQCM: CPT

## 2025-08-04 PROCEDURE — 97597 DBRDMT OPN WND 1ST 20 CM/<: CPT

## 2025-08-07 ENCOUNTER — HOSPITAL ENCOUNTER (OUTPATIENT)
Dept: PHYSICAL THERAPY | Facility: HOSPITAL | Age: 85
Setting detail: THERAPIES SERIES
Discharge: HOME | End: 2025-08-07
Payer: MEDICARE

## 2025-08-07 DIAGNOSIS — S21.009D: ICD-10-CM

## 2025-08-07 DIAGNOSIS — Z90.13 H/O BILATERAL MASTECTOMY: ICD-10-CM

## 2025-08-07 DIAGNOSIS — T81.31XD SURGICAL WOUND DEHISCENCE, SUBSEQUENT ENCOUNTER: Primary | ICD-10-CM

## 2025-08-07 PROCEDURE — 97597 DBRDMT OPN WND 1ST 20 CM/<: CPT

## 2025-08-11 ENCOUNTER — HOSPITAL ENCOUNTER (OUTPATIENT)
Dept: PHYSICAL THERAPY | Facility: HOSPITAL | Age: 85
Setting detail: THERAPIES SERIES
Discharge: HOME | End: 2025-08-11
Payer: MEDICARE

## 2025-08-11 DIAGNOSIS — S21.009D: ICD-10-CM

## 2025-08-11 DIAGNOSIS — T81.31XD SURGICAL WOUND DEHISCENCE, SUBSEQUENT ENCOUNTER: Primary | ICD-10-CM

## 2025-08-11 DIAGNOSIS — Z90.13 H/O BILATERAL MASTECTOMY: ICD-10-CM

## 2025-08-11 PROCEDURE — 97597 DBRDMT OPN WND 1ST 20 CM/<: CPT

## 2025-08-13 ENCOUNTER — OFFICE VISIT (OUTPATIENT)
Dept: ONCOLOGY | Facility: CLINIC | Age: 85
End: 2025-08-13
Payer: MEDICARE

## 2025-08-13 VITALS
OXYGEN SATURATION: 93 % | HEART RATE: 83 BPM | BODY MASS INDEX: 35.08 KG/M2 | DIASTOLIC BLOOD PRESSURE: 59 MMHG | TEMPERATURE: 98 F | WEIGHT: 198 LBS | RESPIRATION RATE: 16 BRPM | SYSTOLIC BLOOD PRESSURE: 121 MMHG | HEIGHT: 63 IN

## 2025-08-13 DIAGNOSIS — C50.911 MALIGNANT NEOPLASM OF RIGHT BREAST IN FEMALE, ESTROGEN RECEPTOR POSITIVE, UNSPECIFIED SITE OF BREAST: Primary | ICD-10-CM

## 2025-08-13 DIAGNOSIS — Z17.0 MALIGNANT NEOPLASM OF RIGHT BREAST IN FEMALE, ESTROGEN RECEPTOR POSITIVE, UNSPECIFIED SITE OF BREAST: Primary | ICD-10-CM

## 2025-08-14 ENCOUNTER — HOSPITAL ENCOUNTER (OUTPATIENT)
Dept: PHYSICAL THERAPY | Facility: HOSPITAL | Age: 85
Setting detail: THERAPIES SERIES
Discharge: HOME | End: 2025-08-14
Payer: MEDICARE

## 2025-08-14 DIAGNOSIS — Z90.13 H/O BILATERAL MASTECTOMY: ICD-10-CM

## 2025-08-14 DIAGNOSIS — T81.31XD SURGICAL WOUND DEHISCENCE, SUBSEQUENT ENCOUNTER: Primary | ICD-10-CM

## 2025-08-14 DIAGNOSIS — S21.009D: ICD-10-CM

## 2025-08-14 PROCEDURE — 97597 DBRDMT OPN WND 1ST 20 CM/<: CPT

## 2025-08-18 ENCOUNTER — HOSPITAL ENCOUNTER (OUTPATIENT)
Dept: PHYSICAL THERAPY | Facility: HOSPITAL | Age: 85
Setting detail: THERAPIES SERIES
Discharge: HOME | End: 2025-08-18
Payer: MEDICARE

## 2025-08-18 DIAGNOSIS — S21.009D: ICD-10-CM

## 2025-08-18 DIAGNOSIS — T81.31XD SURGICAL WOUND DEHISCENCE, SUBSEQUENT ENCOUNTER: Primary | ICD-10-CM

## 2025-08-18 PROCEDURE — 97597 DBRDMT OPN WND 1ST 20 CM/<: CPT

## 2025-08-21 ENCOUNTER — HOSPITAL ENCOUNTER (OUTPATIENT)
Dept: PHYSICAL THERAPY | Facility: HOSPITAL | Age: 85
Setting detail: THERAPIES SERIES
Discharge: HOME | End: 2025-08-21
Payer: MEDICARE

## 2025-08-21 DIAGNOSIS — Z90.13 H/O BILATERAL MASTECTOMY: ICD-10-CM

## 2025-08-21 DIAGNOSIS — T81.31XD SURGICAL WOUND DEHISCENCE, SUBSEQUENT ENCOUNTER: Primary | ICD-10-CM

## 2025-08-21 DIAGNOSIS — S21.009D: ICD-10-CM

## 2025-08-21 PROCEDURE — 97597 DBRDMT OPN WND 1ST 20 CM/<: CPT

## 2025-08-25 ENCOUNTER — HOSPITAL ENCOUNTER (OUTPATIENT)
Dept: PHYSICAL THERAPY | Facility: HOSPITAL | Age: 85
Setting detail: THERAPIES SERIES
Discharge: HOME | End: 2025-08-25
Payer: MEDICARE

## 2025-08-25 DIAGNOSIS — S21.009D: ICD-10-CM

## 2025-08-25 DIAGNOSIS — Z90.13 H/O BILATERAL MASTECTOMY: ICD-10-CM

## 2025-08-25 DIAGNOSIS — T81.31XD SURGICAL WOUND DEHISCENCE, SUBSEQUENT ENCOUNTER: Primary | ICD-10-CM

## 2025-08-25 PROCEDURE — 97597 DBRDMT OPN WND 1ST 20 CM/<: CPT

## 2025-08-28 ENCOUNTER — HOSPITAL ENCOUNTER (OUTPATIENT)
Dept: PHYSICAL THERAPY | Facility: HOSPITAL | Age: 85
Setting detail: THERAPIES SERIES
Discharge: HOME | End: 2025-08-28
Payer: MEDICARE

## 2025-08-28 DIAGNOSIS — Z90.13 H/O BILATERAL MASTECTOMY: ICD-10-CM

## 2025-08-28 DIAGNOSIS — S21.009D: ICD-10-CM

## 2025-08-28 DIAGNOSIS — T81.31XD SURGICAL WOUND DEHISCENCE, SUBSEQUENT ENCOUNTER: Primary | ICD-10-CM

## 2025-08-28 PROCEDURE — 97597 DBRDMT OPN WND 1ST 20 CM/<: CPT

## (undated) DEVICE — PK LAP LASR CHOLE 10

## (undated) DEVICE — BLAKE SILICONE DRAINS CARDIO CONNECTOR 2:1: Brand: BLAKE

## (undated) DEVICE — 2000CC GUARDIAN II: Brand: GUARDIAN

## (undated) DEVICE — ENDOPATH XCEL BLADELESS TROCARS WITH STABILITY SLEEVES: Brand: ENDOPATH XCEL

## (undated) DEVICE — DRAIN JACKSON PRATT ROUND 15FR: Brand: CARDINAL HEALTH

## (undated) DEVICE — SPNG GZ WOVN 4X4IN 12PLY 10/BX STRL

## (undated) DEVICE — CLTH CLENS READYCLEANSE PERI CARE PK/5

## (undated) DEVICE — ANTIBACTERIAL UNDYED BRAIDED (POLYGLACTIN 910), SYNTHETIC ABSORBABLE SUTURE: Brand: COATED VICRYL

## (undated) DEVICE — ECHELON FLEX 60 ARTICULATING ENDOSCOPIC LINEAR CUTTER (NO CARTRIDGE): Brand: ECHELON FLEX ENDOPATH

## (undated) DEVICE — CANNULA,OXY,ADULT,SUPERSOFT,W/7'TUB,UC: Brand: MEDLINE

## (undated) DEVICE — MEDI-VAC YANKAUER SUCTION HANDLE W/BULBOUS TIP: Brand: CARDINAL HEALTH

## (undated) DEVICE — 3M™ STERI-DRAPE™ INSTRUMENT POUCH 1018L: Brand: STERI-DRAPE™

## (undated) DEVICE — 2963 MEDIPORE SOFT CLOTH TAPE 3 IN X 10 YD 12 RLS/CS: Brand: 3M™ MEDIPORE™

## (undated) DEVICE — GLV SURG SENSICARE W/ALOE PF LF 7 STRL

## (undated) DEVICE — SCOPE WARMER THAT PRE-WARMS MULTIPLE LAPAROSCOPES.: Brand: JOSNOE MEDICAL INC

## (undated) DEVICE — TUBING, SUCTION, 1/4" X 10', STRAIGHT: Brand: MEDLINE

## (undated) DEVICE — [HIGH FLOW HEATED INSUFFLATOR TUBING,  DO NOT USE IF PACKAGE IS DAMAGED]

## (undated) DEVICE — ECHELON FLEX  POWERED VASCULAR STAPLER WITH ADVANCED PLACEMENT TIP, 35MM: Brand: ECHELON FLEX

## (undated) DEVICE — RESERVOIR,SUCTION,100CC,SILICONE: Brand: MEDLINE

## (undated) DEVICE — SUT MNCRYL PLS ANTIB UD 4/0 PS2 18IN

## (undated) DEVICE — PK THORACOTOMY 10

## (undated) DEVICE — GOWN,SIRUS,NONRNF,SETINSLV,XL,20/CS: Brand: MEDLINE

## (undated) DEVICE — SUT MNCRYL PLS ANTIB UD 4/0 SH 27IN

## (undated) DEVICE — BOWL UTIL STRL 32OZ

## (undated) DEVICE — SUT SILK 2/0 PS 18IN 1588H

## (undated) DEVICE — SKIN AFFIX SURG ADHESIVE 72/CS 0.55ML: Brand: MEDLINE

## (undated) DEVICE — PENCL E/S HNDSWCH ROCKRBTN HOLSTR 10FT

## (undated) DEVICE — NDL HYPO ECLPS SFTY 22G 1 1/2IN

## (undated) DEVICE — 100% SILICONE FOLEY TRAY,16 FR/CH (5.3 MM), 5 ML CATHETER PRE-CONNECTED TO 2000 ML DRAINAGE BAG WITH LUER-LOCK SAMPLING: Brand: DOVER

## (undated) DEVICE — ELECTRD BLD EZ CLN MOD 4IN

## (undated) DEVICE — HDRST POSTIN FM CRDL TRACH SLOT NONCOMRESS 9X8X4IN

## (undated) DEVICE — ST PRIM GRVTY NDLESS 3 INJ PORT 105IN

## (undated) DEVICE — TOTAL TRAY, 16FR 10ML SIL FOLEY, URN: Brand: MEDLINE

## (undated) DEVICE — Device

## (undated) DEVICE — SAFESECURE,SECUREMENT,FOLEY CATH,STERILE: Brand: MEDLINE

## (undated) DEVICE — ENDOPATH XCEL UNIVERSAL TROCAR STABLILITY SLEEVES: Brand: ENDOPATH XCEL

## (undated) DEVICE — MAGNETIC DRAPE: Brand: DEVON

## (undated) DEVICE — APPL CHLORAPREP TINTED 26ML TEAL

## (undated) DEVICE — ADHS LIQ MASTISOL 2/3ML

## (undated) DEVICE — SUCTION CANISTER, 2500CC, RIGID: Brand: DEROYAL

## (undated) DEVICE — ANTIBACTERIAL UNDYED BRAIDED (POLYGLACTIN 910), SYNTHETIC ABSORBABLE SURGICAL SUTURE: Brand: COATED VICRYL

## (undated) DEVICE — ENCORE® LATEX MICRO SIZE 6.5, STERILE LATEX POWDER-FREE SURGICAL GLOVE: Brand: ENCORE

## (undated) DEVICE — SUT SILK 3/0 TIES 18IN A184H

## (undated) DEVICE — SUT ETHLN 2/0 PS 18IN 585H

## (undated) DEVICE — TRAP FLD MINIVAC MEGADYNE 100ML

## (undated) DEVICE — SPNG GZ STRL 2S 4X4 12PLY

## (undated) DEVICE — CVR HNDL LT SURG ACCSSRY BLU STRL

## (undated) DEVICE — DEFOGGER!" ANTI FOG KIT: Brand: DEROYAL

## (undated) DEVICE — AIRWY 90MM NO9

## (undated) DEVICE — UNDERGLV SURG BIOGEL INDICATOR LF PF 7.5

## (undated) DEVICE — NDL HYPO ECLPS SFTY 18G 1 1/2IN

## (undated) DEVICE — ELECTRD BLD EXT EDGE/INSUL 6IN

## (undated) DEVICE — GLV SURG BIOGEL LTX PF 7

## (undated) DEVICE — GAUZE FLUFF 1 PLY: Brand: DEROYAL

## (undated) DEVICE — DRSNG WND BORDR/ADHS NONADHR/GZ LF 2X2IN STRL

## (undated) DEVICE — HARMONIC ACE +7 LAPAROSCOPIC SHEARS ADVANCED HEMOSTASIS 5MM DIAMETER 36CM SHAFT LENGTH  FOR USE WITH GRAY HAND PIECE ONLY: Brand: HARMONIC ACE

## (undated) DEVICE — MEDI-VAC NON-CONDUCTIVE SUCTION TUBING: Brand: CARDINAL HEALTH

## (undated) DEVICE — TBG SXN CONN/F 1/2IN 100FT N/S

## (undated) DEVICE — LEX GENERAL BREAST: Brand: MEDLINE INDUSTRIES, INC.

## (undated) DEVICE — TISSUE RETRIEVAL SYSTEM: Brand: INZII RETRIEVAL SYSTEM

## (undated) DEVICE — LAPAROSCOPIC TROCAR SLEEVE: Brand: DISPOSABLE CANNULAS AND SEALS

## (undated) DEVICE — TRAP,MUCUS SPECIMEN,40CC: Brand: MEDLINE

## (undated) DEVICE — PROXIMATE RH ROTATING HEAD SKIN STAPLERS (35 WIDE) CONTAINS 35 STAINLESS STEEL STAPLES: Brand: PROXIMATE

## (undated) DEVICE — CVR HNDL LIGHT RIGID

## (undated) DEVICE — FLTR PLUMEPORT LAP W/CONN STRL

## (undated) DEVICE — SUT VIC 2/0 CT2 27IN J269H

## (undated) DEVICE — SUT SILK 0 SH 30IN K834H

## (undated) DEVICE — 3M™ STERI-STRIP™ REINFORCED ADHESIVE SKIN CLOSURES, R1547, 1/2 IN X 4 IN (12 MM X 100 MM), 6 STRIPS/ENVELOPE: Brand: 3M™ STERI-STRIP™

## (undated) DEVICE — ENDOCUT SCISSOR TIP, DISPOSABLE: Brand: RENEW

## (undated) DEVICE — SUT ETHIB 1 CT1 30IN  X425H

## (undated) DEVICE — ELECTRD BLD EDGE/INSUL1P 2.4X5.1MM STRL

## (undated) DEVICE — ELECTRD BLD EZ CLN MOD XLNG 2.75IN

## (undated) DEVICE — SPNG LAP PREWSH SFTPK 18X18IN STRL PK/5

## (undated) DEVICE — 3M™ TEGADERM™ IV TRANSPARENT FILM DRESSING WITH BORDER 1610: Brand: 3M™ TEGADERM™

## (undated) DEVICE — SYR SLP TP 10ML DISP

## (undated) DEVICE — DRN WND CH RND FUL/FLUT NO/TROC 3/8IN 28F

## (undated) DEVICE — SYR LL TP 10ML STRL

## (undated) DEVICE — SINGLE USE BIOPSY VALVE MAJ-210: Brand: SINGLE USE BIOPSY VALVE (STERILE)

## (undated) DEVICE — VISUALIZATION SYSTEM: Brand: CLEARIFY

## (undated) DEVICE — SOL LR 1000ML